# Patient Record
Sex: FEMALE | Race: WHITE | NOT HISPANIC OR LATINO | Employment: FULL TIME | URBAN - METROPOLITAN AREA
[De-identification: names, ages, dates, MRNs, and addresses within clinical notes are randomized per-mention and may not be internally consistent; named-entity substitution may affect disease eponyms.]

---

## 2016-08-25 LAB
HPV ABSTRACT: NORMAL
PAP-ABSTRACT: NORMAL

## 2017-02-24 ENCOUNTER — HOSPITAL ENCOUNTER (OUTPATIENT)
Dept: MAMMOGRAPHY | Facility: CLINIC | Age: 42
Discharge: HOME | End: 2017-02-24
Attending: NURSE PRACTITIONER | Admitting: NURSE PRACTITIONER
Payer: COMMERCIAL

## 2017-02-24 ENCOUNTER — HOSPITAL ENCOUNTER (OUTPATIENT)
Dept: MAMMOGRAPHY | Facility: CLINIC | Age: 42
End: 2017-02-24
Attending: NURSE PRACTITIONER
Payer: COMMERCIAL

## 2017-02-24 DIAGNOSIS — N64.4 MASTODYNIA: ICD-10-CM

## 2017-02-24 DIAGNOSIS — N64.59 BREAST THICKENING: ICD-10-CM

## 2017-02-24 PROCEDURE — 77061 BREAST TOMOSYNTHESIS UNI: CPT

## 2017-02-24 PROCEDURE — 76642 ULTRASOUND BREAST LIMITED: CPT | Mod: RT

## 2017-02-27 ENCOUNTER — HOSPITAL ENCOUNTER (EMERGENCY)
Facility: CLINIC | Age: 42
Discharge: HOME | End: 2017-02-28
Attending: EMERGENCY MEDICINE | Admitting: EMERGENCY MEDICINE
Payer: COMMERCIAL

## 2017-02-27 ENCOUNTER — APPOINTMENT (OUTPATIENT)
Dept: GENERAL RADIOLOGY | Facility: CLINIC | Age: 42
End: 2017-02-27
Attending: EMERGENCY MEDICINE
Payer: COMMERCIAL

## 2017-02-27 DIAGNOSIS — R10.9 ABDOMINAL CRAMPING: ICD-10-CM

## 2017-02-27 DIAGNOSIS — R11.0 NAUSEA: ICD-10-CM

## 2017-02-27 LAB
ALBUMIN SERPL-MCNC: 4.7 G/DL (ref 3.4–5)
ALBUMIN UR-MCNC: 30 MG/DL
ALP SERPL-CCNC: 72 U/L (ref 40–150)
ALT SERPL W P-5'-P-CCNC: 24 U/L (ref 0–50)
ANION GAP SERPL CALCULATED.3IONS-SCNC: 11 MMOL/L (ref 3–14)
APPEARANCE UR: ABNORMAL
AST SERPL W P-5'-P-CCNC: 16 U/L (ref 0–45)
BACTERIA #/AREA URNS HPF: ABNORMAL /HPF
BASOPHILS # BLD AUTO: 0 10E9/L (ref 0–0.2)
BASOPHILS NFR BLD AUTO: 0.1 %
BILIRUB SERPL-MCNC: 0.5 MG/DL (ref 0.2–1.3)
BILIRUB UR QL STRIP: NEGATIVE
BUN SERPL-MCNC: 6 MG/DL (ref 7–30)
CALCIUM SERPL-MCNC: 9.5 MG/DL (ref 8.5–10.1)
CHLORIDE SERPL-SCNC: 100 MMOL/L (ref 94–109)
CO2 SERPL-SCNC: 23 MMOL/L (ref 20–32)
COLOR UR AUTO: YELLOW
CREAT SERPL-MCNC: 0.64 MG/DL (ref 0.52–1.04)
DIFFERENTIAL METHOD BLD: NORMAL
EOSINOPHIL # BLD AUTO: 0 10E9/L (ref 0–0.7)
EOSINOPHIL NFR BLD AUTO: 0.1 %
ERYTHROCYTE [DISTWIDTH] IN BLOOD BY AUTOMATED COUNT: 11.8 % (ref 10–15)
GFR SERPL CREATININE-BSD FRML MDRD: ABNORMAL ML/MIN/1.7M2
GLUCOSE SERPL-MCNC: 141 MG/DL (ref 70–99)
GLUCOSE UR STRIP-MCNC: 150 MG/DL
HCG UR QL: NEGATIVE
HCT VFR BLD AUTO: 42.4 % (ref 35–47)
HGB BLD-MCNC: 15.2 G/DL (ref 11.7–15.7)
HGB UR QL STRIP: NEGATIVE
IMM GRANULOCYTES # BLD: 0 10E9/L (ref 0–0.4)
IMM GRANULOCYTES NFR BLD: 0.2 %
INTERNAL QC OK POCT: YES
KETONES UR STRIP-MCNC: 10 MG/DL
LEUKOCYTE ESTERASE UR QL STRIP: ABNORMAL
LIPASE SERPL-CCNC: 152 U/L (ref 73–393)
LYMPHOCYTES # BLD AUTO: 1.3 10E9/L (ref 0.8–5.3)
LYMPHOCYTES NFR BLD AUTO: 13.6 %
MCH RBC QN AUTO: 32.4 PG (ref 26.5–33)
MCHC RBC AUTO-ENTMCNC: 35.8 G/DL (ref 31.5–36.5)
MCV RBC AUTO: 90 FL (ref 78–100)
MONOCYTES # BLD AUTO: 0.4 10E9/L (ref 0–1.3)
MONOCYTES NFR BLD AUTO: 4.3 %
MUCOUS THREADS #/AREA URNS LPF: PRESENT /LPF
NEUTROPHILS # BLD AUTO: 7.7 10E9/L (ref 1.6–8.3)
NEUTROPHILS NFR BLD AUTO: 81.7 %
NITRATE UR QL: NEGATIVE
NRBC # BLD AUTO: 0 10*3/UL
NRBC BLD AUTO-RTO: 0 /100
PH UR STRIP: 7 PH (ref 5–7)
PLATELET # BLD AUTO: 278 10E9/L (ref 150–450)
POTASSIUM SERPL-SCNC: 3.6 MMOL/L (ref 3.4–5.3)
PROT SERPL-MCNC: 8.3 G/DL (ref 6.8–8.8)
RBC # BLD AUTO: 4.69 10E12/L (ref 3.8–5.2)
RBC #/AREA URNS AUTO: 2 /HPF (ref 0–2)
SODIUM SERPL-SCNC: 134 MMOL/L (ref 133–144)
SP GR UR STRIP: 1.02 (ref 1–1.03)
SQUAMOUS #/AREA URNS AUTO: 11 /HPF (ref 0–1)
URN SPEC COLLECT METH UR: ABNORMAL
UROBILINOGEN UR STRIP-MCNC: NORMAL MG/DL (ref 0–2)
WBC # BLD AUTO: 9.4 10E9/L (ref 4–11)
WBC #/AREA URNS AUTO: 5 /HPF (ref 0–2)

## 2017-02-27 PROCEDURE — 85025 COMPLETE CBC W/AUTO DIFF WBC: CPT | Performed by: EMERGENCY MEDICINE

## 2017-02-27 PROCEDURE — 96374 THER/PROPH/DIAG INJ IV PUSH: CPT | Performed by: EMERGENCY MEDICINE

## 2017-02-27 PROCEDURE — 25000128 H RX IP 250 OP 636: Performed by: EMERGENCY MEDICINE

## 2017-02-27 PROCEDURE — 81025 URINE PREGNANCY TEST: CPT | Performed by: EMERGENCY MEDICINE

## 2017-02-27 PROCEDURE — 80053 COMPREHEN METABOLIC PANEL: CPT | Performed by: EMERGENCY MEDICINE

## 2017-02-27 PROCEDURE — 99284 EMERGENCY DEPT VISIT MOD MDM: CPT | Mod: 25 | Performed by: EMERGENCY MEDICINE

## 2017-02-27 PROCEDURE — 81001 URINALYSIS AUTO W/SCOPE: CPT | Performed by: EMERGENCY MEDICINE

## 2017-02-27 PROCEDURE — 83690 ASSAY OF LIPASE: CPT | Performed by: EMERGENCY MEDICINE

## 2017-02-27 PROCEDURE — S0028 INJECTION, FAMOTIDINE, 20 MG: HCPCS | Performed by: EMERGENCY MEDICINE

## 2017-02-27 PROCEDURE — 96375 TX/PRO/DX INJ NEW DRUG ADDON: CPT | Performed by: EMERGENCY MEDICINE

## 2017-02-27 PROCEDURE — 99284 EMERGENCY DEPT VISIT MOD MDM: CPT | Mod: Z6 | Performed by: EMERGENCY MEDICINE

## 2017-02-27 PROCEDURE — 25000125 ZZHC RX 250: Performed by: EMERGENCY MEDICINE

## 2017-02-27 PROCEDURE — 96361 HYDRATE IV INFUSION ADD-ON: CPT | Performed by: EMERGENCY MEDICINE

## 2017-02-27 PROCEDURE — 74020 XR ABDOMEN 2 VW: CPT

## 2017-02-27 RX ORDER — ONDANSETRON 2 MG/ML
8 INJECTION INTRAMUSCULAR; INTRAVENOUS ONCE
Status: COMPLETED | OUTPATIENT
Start: 2017-02-27 | End: 2017-02-27

## 2017-02-27 RX ADMIN — FAMOTIDINE 20 MG: 10 INJECTION, SOLUTION INTRAVENOUS at 22:45

## 2017-02-27 RX ADMIN — SODIUM CHLORIDE 1000 ML: 9 INJECTION, SOLUTION INTRAVENOUS at 22:36

## 2017-02-27 RX ADMIN — ONDANSETRON 8 MG: 2 INJECTION INTRAMUSCULAR; INTRAVENOUS at 22:39

## 2017-02-27 ASSESSMENT — ENCOUNTER SYMPTOMS
NAUSEA: 1
CONSTIPATION: 1
DIARRHEA: 1
ABDOMINAL PAIN: 1

## 2017-02-27 NOTE — ED AVS SNAPSHOT
[facility], Emergency Department    [address]    [place] [address]    Phone:  [phone]    Fax:  [phone]                                       Aditi Osuna   MRN: [id number]    Department:  [facility], Emergency Department   Date of Visit:  2/27/2017           Patient Information     Date Of Birth          1975        Your diagnoses for this visit were:     Abdominal cramping     Nausea        You were seen by Swapna Lemus MD.        Discharge Instructions         *Abdominal Pain, Unknown Cause (Female)    The exact cause of your abdominal (stomach) pain is not certain. This does not mean that this is something to worry about, or the right tests were not done. Everyone likes to know the exact cause of the problem, but sometimes with abdominal pain, there is no clear-cut cause, and this could be a good thing. The good news is that your symptoms can be treated, and you will feel better.   Your condition does not seem serious now; however, sometimes the signs of a serious problem may take more time to appear. For this reason, it is important for you to watch for any new symptoms, problems, or worsening of your condition.  Over the next few days, the abdominal pain may come and go, or be continuous. Other common symptoms can include nausea and vomiting. Sometimes it can be difficult to tell if you feel nauseous, you may just feel bad and not associate that feeling with nausea. Constipation, diarrhea, and a fever may go along with the pain.  The pain may continue even if treated correctly over the following days. Depending on how things go, sometimes the cause can become clear and may require further or different treatment. Additional evaluations, medications, or tests may be needed.  Home care  Your health care provider may prescribe medications for pain, symptoms, or an infection.  Follow the health care provider's instructions for taking these medications.  General  care    Rest until your next exam. No strenuous activities.    Try to find positions that ease discomfort. A small pillow placed on the abdomen may help relieve pain.    Something warm on your abdomen (such as a heating pad) may help, but be careful not to burn yourself.  Diet    Do not force yourself to eat, especially if having cramps, vomiting, or diarrhea.    Water is important so you do not get dehydrated. Soup may also be good. Sports drinks may also help, especially if they are not too acidic. Make sure you don't drink sugary drinks as this can make things worse. Take liquids in small amounts. Do not guzzle them.    Caffeine sometimes makes the pain and cramping worse.    Avoid dairy products if you have vomiting or diarrhea.    Don't eat large amounts at a time. Wait a few minutes between bites.    Eat a diet low in fiber (called a low-residue diet). Foods allowed include refined breads, white rice, fruit and vegetable juices without pulp, tender meats. These foods will pass more easily through the intestine.    Avoid fried or fatty foods, dairy, alcohol and spicy foods until your symptoms go away.  Follow-up care  Follow up with your health care provider as instructed, or if your pain does not begin to improve in the next 24 hours.  When to seek medical care  Seek prompt medical care if any of the following occur:    Pain gets worse or moves to the right lower abdomen    New or worsening vomiting or diarrhea    Swelling of the abdomen    Unable to pass stool for more than three days    New fever over 101  F (38.3 C), or rising fever    Blood in vomit or bowel movements (dark red or black color)    Jaundice (yellow color of eyes and skin)    Weakness, dizziness    Chest, arm, back, neck or jaw pain    Unexpected vaginal bleeding or missed period  Call 911  Call emergency services if any of the following occur:    Trouble breathing    Confusion    Fainting or loss of consciousness    Rapid heart  rate    Seizure    [id number] [employer], [address] [place] [address], [place] [address]. All rights reserved. This information is not intended as a substitute for professional medical care. Always follow your healthcare professional's instructions.      Please make an appointment to follow up with Your Primary Care Provider in 2-3 days if not improving.  Take a bottle of magnesium citrate, and if not having good stool output, take another bottle in 24 hours.      24 Hour Appointment Hotline       To make an appointment at any [facility], call [phone] ([phone]). If you don't have a family doctor or clinic, we will help you find one. [facility] clinics are conveniently located to serve the needs of you and your family.             Review of your medicines      START taking        Dose / Directions Last dose taken    magnesium citrate 1.745 GM/30ML solution   Dose:  296 mL   Quantity:  2 Bottle        Take 296 mLs by mouth daily for 2 doses   Refills:  0        ondansetron 8 MG ODT tab   Commonly known as:  ZOFRAN ODT   Dose:  8 mg   Quantity:  10 tablet        Take 1 tablet (8 mg) by mouth every 8 hours as needed for nausea   Refills:  0          Our records show that you are taking the medicines listed below. If these are incorrect, please call your family doctor or clinic.        Dose / Directions Last dose taken    azelastine 0.1 % spray   Commonly known as:  ASTELIN   Dose:  1 spray        Spray 1 spray into both nostrils 2 times daily   Refills:  0        fluticasone 50 MCG/ACT spray   Commonly known as:  FLONASE   Dose:  2 spray        Spray 2 sprays into both nostrils daily   Refills:  0        glucosamine-chondroitin 500-400 MG Caps per capsule   Dose:  1 capsule        Take 1 capsule by mouth daily   Refills:  0        Multi-vitamin Tabs tablet   Dose:  1 tablet        Take 1 tablet by mouth daily   Refills:  0        OMEGA-3 FISH OIL PO   Dose:  1 g        Take 1 g by mouth   Refills:  0         VITAMIN D (CHOLECALCIFEROL) PO   Dose:  2000 Units        Take 2,000 Units by mouth daily   Refills:  0                Prescriptions were sent or printed at these locations (2 Prescriptions)                   Other Prescriptions                Printed at Department/Unit printer (2 of 2)         ondansetron (ZOFRAN ODT) 8 MG ODT tab               magnesium citrate 1.745 GM/30ML solution                Procedures and tests performed during your visit     Abdomen XR, 2 vw, flat and upright    CBC with platelets differential    Comprehensive metabolic panel    Lipase    UA with Microscopic    hCG qual urine POCT      Orders Needing Specimen Collection     None      Pending Results     No orders found for last 3 day(s).            Pending Culture Results     No orders found for last 3 day(s).            Thank you for choosing [facility]       Thank you for choosing [facility] for your care. Our goal is always to provide you with excellent care. Hearing back from our patients is one way we can continue to improve our services. Please take a few minutes to complete the written survey that you may receive in the mail after you visit with us. Thank you!        [email or web address] Information     [facility] gives you secure access to your electronic health record. If you see a primary care provider, you can also send messages to your care team and make appointments. If you have questions, please call your primary care clinic.  If you do not have a primary care provider, please call [phone] and they will assist you.        Care EveryWhere ID     This is your Care EveryWhere ID. This could be used by other organizations to access your [facility] medical records  [id number]        After Visit Summary       This is your record. Keep this with you and show to your community pharmacist(s) and doctor(s) at your next visit.

## 2017-02-27 NOTE — ED AVS SNAPSHOT
[facility], Emergency Department    [address]    [facility] [address]    Phone:  [phone]    Fax:  [phone]                                       Aditi Osuna   MRN: [id number]    Department:  [facility], Emergency Department   Date of Visit:  2/27/2017           After Visit Summary Signature Page     I have received my discharge instructions, and my questions have been answered. I have discussed any challenges I see with this plan with the nurse or doctor.    ..........................................................................................................................................  Patient/Patient Representative Signature      ..........................................................................................................................................  Patient Representative Print Name and Relationship to Patient    ..................................................               ................................................  Date                                            Time    ..........................................................................................................................................  Reviewed by Signature/Title    ...................................................              ..............................................  Date                                                            Time

## 2017-02-28 VITALS
BODY MASS INDEX: 22.43 KG/M2 | HEART RATE: 70 BPM | SYSTOLIC BLOOD PRESSURE: 138 MMHG | RESPIRATION RATE: 16 BRPM | OXYGEN SATURATION: 100 % | DIASTOLIC BLOOD PRESSURE: 72 MMHG | TEMPERATURE: 97.9 F | WEIGHT: 140 LBS

## 2017-02-28 RX ORDER — ONDANSETRON 8 MG/1
8 TABLET, ORALLY DISINTEGRATING ORAL EVERY 8 HOURS PRN
Qty: 10 TABLET | Refills: 0 | Status: SHIPPED | OUTPATIENT
Start: 2017-02-28 | End: 2017-03-03

## 2017-02-28 RX ORDER — MAGNESIUM CARB/ALUMINUM HYDROX 105-160MG
296 TABLET,CHEWABLE ORAL DAILY
Qty: 2 BOTTLE | Refills: 0 | Status: SHIPPED | OUTPATIENT
Start: 2017-02-28 | End: 2017-03-02

## 2017-02-28 ASSESSMENT — ENCOUNTER SYMPTOMS
SHORTNESS OF BREATH: 0
FEVER: 0

## 2017-02-28 NOTE — ED PROVIDER NOTES
History     Chief Complaint   Patient presents with     Abdominal Pain     Onset Saturday with abdominal pain,radiating to back, diarrhea, chills, nausea and unable to eat,     HPI  Aditi Osuna is a 41 year old otherwise healthy female who presents to the ED today with abdominal pain. On Saturday, patient reports onset of lower abdominal cramping after eating dinner. Patient initially though it was food poisoning but has now had persistent abdominal pain with associated nausea. She states she was able to have a bowel movement with small pellet like stool at 3 AM last night. Patient states she had some apple sauce this evening and had one episode of diarrhea but was otherwise having pellet like stools prior. She denies vomiting but notes she has been salivating/ large amounts of saliva in her mouth secondary to nausea, but no actual vomiting. She denies hematuria or vaginal discharge. No fever. No recent sick contacts with similar sx ( with URI sx). She states she had the stomach flu in January and a sinus infection in December. She denies abdominal surgeries.      I have reviewed the Medications, Allergies, Past Medical and Surgical History, and Social History in the [employer] system.    PAST MEDICAL HISTORY: History reviewed. No pertinent past medical history.    PAST SURGICAL HISTORY: History reviewed. No pertinent past surgical history.    FAMILY HISTORY:   Family History   Problem Relation Age of Onset     OSTEOPOROSIS Mother      Hypertension Father      DIABETES Father      Other Cancer Maternal Grandfather      HEART DISEASE Paternal Grandfather        SOCIAL HISTORY:   Social History   Substance Use Topics     Smoking status: Never Smoker     Smokeless tobacco: Not on file     Alcohol use Yes     No current facility-administered medications for this encounter.      Current Outpatient Prescriptions   Medication     ondansetron (ZOFRAN ODT) 8 MG ODT tab     magnesium citrate 1.745 GM/30ML solution      fluticasone (FLONASE) 50 MCG/ACT nasal spray     azelastine (ASTELIN) 0.1 % nasal spray     multivitamin, therapeutic with minerals (MULTI-VITAMIN) TABS     Omega-3 Fatty Acids (OMEGA-3 FISH OIL PO)     VITAMIN D, CHOLECALCIFEROL, PO     glucosamine-chondroitin 500-400 MG CAPS        Allergies   Allergen Reactions     Augmentin Nausea and Vomiting     Sulfa Drugs Rash         Review of Systems   Constitutional: Negative for fever.   Respiratory: Negative for shortness of breath.    Cardiovascular: Negative for chest pain.   Gastrointestinal: Positive for abdominal pain, constipation, diarrhea and nausea.   All other systems reviewed and are negative.      Physical Exam   BP: 158/79  Pulse: 71  Heart Rate: 71  Temp: 96.2  F (35.7  C)  Resp: 16  Weight: 63.5 kg (140 lb)  SpO2: 99 %  Physical Exam   Constitutional: No distress.   HENT:   Head: Atraumatic.   Mouth/Throat: Oropharynx is clear and moist. No oropharyngeal exudate.   Eyes: Pupils are equal, round, and reactive to light. No scleral icterus.   Cardiovascular: Normal heart sounds and intact distal pulses.    Pulmonary/Chest: Breath sounds normal. No respiratory distress.   Abdominal: Soft. Bowel sounds are normal. There is tenderness (diffuse tenderness with wincing but without guarding).   Musculoskeletal: She exhibits no edema or tenderness.   Skin: Skin is warm. No rash noted. She is not diaphoretic.       ED Course     ED Course     Procedures       10:15 PM  The patient was seen and examined by Dr. Lemus  in Room 5.          Critical Care time:  none               Labs Ordered and Resulted from Time of ED Arrival Up to the Time of Departure from the ED   COMPREHENSIVE METABOLIC PANEL - Abnormal; Notable for the following:        Result Value    Glucose 141 (*)     Urea Nitrogen 6 (*)     All other components within normal limits   ROUTINE UA WITH MICROSCOPIC - Abnormal; Notable for the following:     Glucose Urine 150 (*)     Ketones Urine 10 (*)      Protein Albumin Urine 30 (*)     Leukocyte Esterase Urine Trace (*)     WBC Urine 5 (*)     Bacteria Urine Few (*)     Squamous Epithelial /HPF Urine 11 (*)     Mucous Urine Present (*)     All other components within normal limits   HCG QUAL URINE POCT - Normal   CBC WITH PLATELETS DIFFERENTIAL   LIPASE   URINE CULTURE AEROBIC BACTERIAL       Assessments & Plan (with Medical Decision Making)     The patient does have some diffuse abdominal tenderness.  She has wincing but no guarding.  The cause of her symptoms was not immediately clear based on her history. Labs were done, including a CBC which was unremarkable, CMP which was unremarkable with the exception of mildly elevated glucose, lipase which is normal.  Urinalysis shows 5 white cells, though does show 11 squamous cells, only trace LE. I do think this is likely contamination, and culture was sent.  UPT was negative.  Patient was given Zofran, IV fluid, and IV famotidine.  On repeat evaluation, patient states that her symptoms have dramatically improved.  She states that she still having some slight discomfort, though again, is dramatically improving compared with previous.  On repeat examination, the patient now has no wincing at all, and states that she has a very mild tenderness throughout the upper abdomen only.    I think the likelihood that this is related to an genitourinary problem is pretty low.  Cause of her symptoms is not completely clear, though the fact that it dramatically improved without any pain medication is reassuring.  Patient did admit that she had several very small  pellet  bowel movements, and wondered if she may have been constipated.  Plain abdominal x-ray was done which showed moderate amount of stool burden, no air-fluid levels.  I did discuss risks and benefits of CT scanning with the patient, and did not recommend this at this time given her history, exam, and lab findings, as I have a low suspicion that there will be a  clinically significant positive finding on the imaging.  However, the patient was given this option.  She declines at this point in time.  I have recommended discharge home with a prescription for magnesium citrate to see if getting her bowels moving will improve her symptoms.  She is agreeable to plan.  She was also given Zofran.  She is instructed to return to the emergency department immediately if she feels her symptoms are worsening, id she is developing fevers or any new or concerning symptoms.  She is also instructed to follow up with Primary care if symptoms are not resolving over the next couple days.  She verbalizes understanding and is comfortable with the plan.   This part of the document was transcribed by Kemi Carmichael, Medical Scribe.       I have reviewed the nursing notes.    I have reviewed the findings, diagnosis, plan and need for follow up with the patient.    Discharge Medication List as of 2/28/2017 12:23 AM      START taking these medications    Details   ondansetron (ZOFRAN ODT) 8 MG ODT tab Take 1 tablet (8 mg) by mouth every 8 hours as needed for nausea, Disp-10 tablet, R-0, Local Print      magnesium citrate 1.745 GM/30ML solution Take 296 mLs by mouth daily for 2 doses, Disp-2 Bottle, R-0, Local Print             Final diagnoses:   Abdominal cramping   Nausea     I,Kemi Carmichael , am serving as a trained medical scribe to document services personally performed by Swapna Lemus MD, based on the provider's statements to me.   ISwapna MD, was physically present and have reviewed and verified the accuracy of this note documented by Kemi Carmichael.    2/27/2017   [facility], EMERGENCY DEPARTMENT     Swapna Lemus MD  02/28/17 0043

## 2017-02-28 NOTE — DISCHARGE INSTRUCTIONS
*Abdominal Pain, Unknown Cause (Female)    The exact cause of your abdominal (stomach) pain is not certain. This does not mean that this is something to worry about, or the right tests were not done. Everyone likes to know the exact cause of the problem, but sometimes with abdominal pain, there is no clear-cut cause, and this could be a good thing. The good news is that your symptoms can be treated, and you will feel better.   Your condition does not seem serious now; however, sometimes the signs of a serious problem may take more time to appear. For this reason, it is important for you to watch for any new symptoms, problems, or worsening of your condition.  Over the next few days, the abdominal pain may come and go, or be continuous. Other common symptoms can include nausea and vomiting. Sometimes it can be difficult to tell if you feel nauseous, you may just feel bad and not associate that feeling with nausea. Constipation, diarrhea, and a fever may go along with the pain.  The pain may continue even if treated correctly over the following days. Depending on how things go, sometimes the cause can become clear and may require further or different treatment. Additional evaluations, medications, or tests may be needed.  Home care  Your health care provider may prescribe medications for pain, symptoms, or an infection.  Follow the health care provider's instructions for taking these medications.  General care    Rest until your next exam. No strenuous activities.    Try to find positions that ease discomfort. A small pillow placed on the abdomen may help relieve pain.    Something warm on your abdomen (such as a heating pad) may help, but be careful not to burn yourself.  Diet    Do not force yourself to eat, especially if having cramps, vomiting, or diarrhea.    Water is important so you do not get dehydrated. Soup may also be good. Sports drinks may also help, especially if they are not too acidic. Make sure you  don't drink sugary drinks as this can make things worse. Take liquids in small amounts. Do not guzzle them.    Caffeine sometimes makes the pain and cramping worse.    Avoid dairy products if you have vomiting or diarrhea.    Don't eat large amounts at a time. Wait a few minutes between bites.    Eat a diet low in fiber (called a low-residue diet). Foods allowed include refined breads, white rice, fruit and vegetable juices without pulp, tender meats. These foods will pass more easily through the intestine.    Avoid fried or fatty foods, dairy, alcohol and spicy foods until your symptoms go away.  Follow-up care  Follow up with your health care provider as instructed, or if your pain does not begin to improve in the next 24 hours.  When to seek medical care  Seek prompt medical care if any of the following occur:    Pain gets worse or moves to the right lower abdomen    New or worsening vomiting or diarrhea    Swelling of the abdomen    Unable to pass stool for more than three days    New fever over 101  F (38.3 C), or rising fever    Blood in vomit or bowel movements (dark red or black color)    Jaundice (yellow color of eyes and skin)    Weakness, dizziness    Chest, arm, back, neck or jaw pain    Unexpected vaginal bleeding or missed period  Call 911  Call emergency services if any of the following occur:    Trouble breathing    Confusion    Fainting or loss of consciousness    Rapid heart rate    Seizure    [id number] [facility], [address] [place] [address], [place] [address]. All rights reserved. This information is not intended as a substitute for professional medical care. Always follow your healthcare professional's instructions.      Please make an appointment to follow up with Your Primary Care Provider in 2-3 days if not improving.  Take a bottle of magnesium citrate, and if not having good stool output, take another bottle in 24 hours.

## 2017-09-01 ENCOUNTER — TRANSFERRED RECORDS (OUTPATIENT)
Dept: HEALTH INFORMATION MANAGEMENT | Facility: CLINIC | Age: 42
End: 2017-09-01

## 2017-09-01 ENCOUNTER — HOSPITAL ENCOUNTER (OUTPATIENT)
Dept: MAMMOGRAPHY | Facility: CLINIC | Age: 42
Discharge: HOME | End: 2017-09-01
Attending: NURSE PRACTITIONER | Admitting: NURSE PRACTITIONER
Payer: COMMERCIAL

## 2017-09-01 DIAGNOSIS — Z12.31 VISIT FOR SCREENING MAMMOGRAM: ICD-10-CM

## 2017-09-01 LAB
HPV ABSTRACT: NORMAL
PAP-ABSTRACT: NORMAL

## 2017-09-01 PROCEDURE — G0202 SCR MAMMO BI INCL CAD: HCPCS

## 2018-04-02 ENCOUNTER — VIRTUAL VISIT (OUTPATIENT)
Dept: FAMILY MEDICINE | Facility: OTHER | Age: 43
End: 2018-04-02

## 2018-04-02 NOTE — PROGRESS NOTES
"Date:   Clinician: Hoang Deutsch  Clinician NPI: [id number]  Patient: august BENJAMIN  Patient : 1975  Patient Address: [address] [place] [address]  Patient Phone: [phone]  Visit Protocol: URI  Patient Summary:  august is a 42 year old ( : 1975 ) female who initiated a Visit for cold, sinus infection, or influenza. When asked the question \"Please sign me up to receive news, health information and promotions from [employer].\", august responded \"No\".    august states her symptoms started gradually 7-9 days ago.   Her symptoms consist of facial pain or pressure, a cough, ear pain, a sore throat, a headache, nasal congestion, malaise, wheezing, myalgia, and tooth pain.   Symptom details     Nasal secretions: The color of her mucus is yellow and green.    Cough: august coughs a few times an hour and her cough is more bothersome at night. Phlegm comes into her throat when she coughs. She believes the phlegm causes the cough. The color of the phlegm is green and yellow.     Sore throat: august reports having mild throat pain (between 1-3 on a 10 point pain scale), does not have exudate on her tonsils, and is able to swallow liquids. The lymph nodes in her neck She is not sure if the lymph nodes in her neck are enlarged. She states that rashes have not appeared on the skin since the sore throat started.     Wheezing: august has not ever been diagnosed with asthma. The wheezing does not interfere with her normal daily activities.    Facial pain or pressure: The facial pain or pressure feels worse when bending over or leaning forward.     Headache: She states the headache is moderate (between 4-6 on a 10 point pain scale).     Tooth pain: The tooth pain is not caused by a cavity, recent dental work, or other mouth problems.      august denies having fever, dyspnea, chills, and rhinitis. She also denies having recent facial or sinus surgery in the past 60 days, taking " antibiotic medication for the symptoms, and double sickening (worsening symptoms after initial improvement).   Within the past week, august has not been exposed to someone with strep throat. She has not recently been exposed to someone with influenza. august has been in close contact with the following high risk individuals: children under the age of 5, people with asthma, heart disease or diabetes, and adults 65 or older.   august had 1 sinus infection within the past year.   Weight: 150 lbs   august does not smoke or use smokeless tobacco.   She denies pregnancy and denies breastfeeding. She has menstruated in the past month.  MEDICATIONS:  Guaifenesin + dextromethorphan (Robitussin DM, Mytussin DM, Mucinex DM), cetirizine (Zyrtec), acetaminophen (Tylenol), phenylephrine (Sudafed PE), and fluticasone (Flonase)   Patient free text response: Astelin spray  , ALLERGIES:   penicillin/amoxicillin/augmentin and sulfa (Bactrim/Septra)    Clinician Response:  Dear august,  Based on the information provided, you have acute bacterial sinusitis, also known as a sinus infection. Sinus infections are caused by bacteria or a virus and symptoms are almost always identical. The difference between the 2 types of infections is timing.  Sinus infections start as viral infections and symptoms improve on their own in about 7 days. If symptoms have not improved after 7 days or have even worsened, a bacterial infection may have developed.  Medication information  I am prescribing:     Azithromycin (Z-pack) 250 mg oral tablet. Take 2 tablets by mouth on the first day and then 1 tablet a day on days 2-5. There are no refills with this prescription.   Antibiotics can cause an allergic reaction even if you have taken them without a problem in the past. If you develop a new rash, swelling, or difficulty breathing, stop the medication and be seen in a clinic or urgent care immediately.  A yeast infection is a side effect of taking  antibiotics in some women. Please use [facility] to get treatment if you have symptoms of a yeast infection.  If you become pregnant during this course of treatment, stop taking the medication and contact your primary care provider.  Self care  The following tips will keep you as comfortable as possible while you recover:     Rest    Drink plenty of water and other liquids    Take a hot shower to loosen congestion    Use throat lozenges    Gargle with warm salt water (1/4 teaspoon of salt per 8 ounce glass of water)    Suck on frozen items such as popsicles or ice cubes    Drink hot tea with lemon and honey    Take a spoonful of honey to reduce your cough     When to seek care  Please be seen in a clinic or urgent care if any of the following occur:     Symptoms do not start to improve after 3 days of treatment    New symptoms develop, or symptoms become worse     Call 911 or go to the emergency room if you feel that your throat is closing off, you suddenly develop a rash, you are unable to swallow fluids, you are drooling, or you are having difficulty breathing.   Diagnosis: Acute bacterial sinusitis  Diagnosis ICD: J01.90  Prescription: azithromycin (Z-pack) 250 mg oral tablet 6 tablets, 5 days supply. Take 2 tablets by mouth on the first day and then 1 tablet a day on days 2-5. Refills: 0, Refill as needed: no, Allow substitutions: yes  Pharmacy: [facility] - [phone] - [address] [place] [id number]

## 2018-09-17 ENCOUNTER — HOSPITAL ENCOUNTER (OUTPATIENT)
Dept: MAMMOGRAPHY | Facility: CLINIC | Age: 43
Discharge: HOME | End: 2018-09-17
Attending: OBSTETRICS & GYNECOLOGY | Admitting: OBSTETRICS & GYNECOLOGY
Payer: COMMERCIAL

## 2018-09-17 DIAGNOSIS — Z12.31 VISIT FOR SCREENING MAMMOGRAM: ICD-10-CM

## 2018-09-17 PROCEDURE — 77063 BREAST TOMOSYNTHESIS BI: CPT

## 2018-09-30 ENCOUNTER — VIRTUAL VISIT (OUTPATIENT)
Dept: FAMILY MEDICINE | Facility: OTHER | Age: 43
End: 2018-09-30

## 2018-09-30 NOTE — PROGRESS NOTES
"Date:   Clinician: Earline Salinas  Clinician NPI: [id number]  Patient: august BENJAMIN  Patient : 1975  Patient Address: [address] [place] [address]  Patient Phone: [phone]  Visit Protocol: URI  Patient Summary:  august is a 43 year old ( : 1975 ) female who initiated a Visit for cold, sinus infection, or influenza. When asked the question \"Please sign me up to receive news, health information and promotions from [employer].\", august responded \"No\".    august states her symptoms started suddenly 7-9 days ago.   Her symptoms consist of enlarged lymph nodes, a cough, a headache, a sore throat, ear pain, nasal congestion, malaise, facial pain or pressure, and myalgia.   Symptom details     Nasal secretions: The color of her mucus is yellow.    Cough: august coughs a few times an hour and her cough is more bothersome at night. Phlegm comes into her throat when she coughs. She believes the phlegm causes the cough. The color of the phlegm is yellow.     Sore throat: august reports having moderate throat pain (between 4-6 on a 10 point pain scale), does not have exudate on her tonsils, and can swallow liquids. The lymph nodes in her neck are enlarged. A rash has not appeared on the skin since the sore throat started.     Facial pain or pressure: The facial pain or pressure feels worse when bending over or leaning forward.     Headache: She states the headache is moderate (between 4-6 on a 10 point pain scale).      august denies having rhinitis, chills, dyspnea, fever, wheezing, and teeth pain. She also denies double sickening (worsening symptoms after initial improvement), taking antibiotic medication for the symptoms, and having recent facial or sinus surgery in the past 60 days.   august is not sure if she has been exposed to someone with strep throat. She has not recently been exposed to someone with influenza. aguust has been in close contact with the " following high risk individuals: people with asthma, heart disease or diabetes, children under the age of 5, and adults 65 or older.   august had 2 sinus infections within the past year.   Weight: 156 lbs   august does not smoke or use smokeless tobacco.   She denies pregnancy and denies breastfeeding. She is currently menstruating.   Additional information as reported by the patient (free text): left ear feels swollen, with pressure, and popping sounds for 7 days   MEDICATIONS: Zoloft oral, Zyrtec-D oral, Flonase Allergy Relief nasal, azelastine nasal, ALLERGIES: Augmentin, Sulfa (Sulfonamide Antibiotics)  Clinician Response:  Dear august,  Based on the information provided, you have acute bacterial sinusitis, also known as a sinus infection. Sinus infections are caused by bacteria or a virus and symptoms are almost always identical. The difference between the 2 types of infections is timing.  Sinus infections start as viral infections and symptoms improve on their own in about 7 days. If symptoms have not improved after 7 days or have even worsened, a bacterial infection may have developed.  Medication information  I am prescribing:       Fluticasone 50 mcg/actuation nasal spray. Inhale 2 sprays in each nostril 1 time per day; after 1 week, may adjust to 1 - 2 sprays in each nostril 1 time per day. This medication takes several days to start working, so keep taking it even if it doesn't help right away. There are no refills with this prescription.      Doxycycline hyclate 100 mg oral tablet. Take 1 tablet by mouth 2 times per day for 7 days. There are no refills with this prescription.     Antibiotics can cause an allergic reaction even if you have taken them without a problem in the past. If you develop a new rash, swelling, or difficulty breathing, stop the medication and be seen in a clinic or urgent care immediately.  A yeast infection is a side effect of taking antibiotics in some women. Please use  [facility] to get treatment if you have symptoms of a yeast infection.  If you become pregnant during this course of treatment, stop taking the medication and contact your primary care provider.  Unless you are allergic to the over-the-counter medication(s) below, I recommend using:     Ibuprofen (Advil or store brand) 200 mg oral tablet. Take 1-3 tablets (200-600 mg) by mouth every 8 hours to help with the discomfort. Make sure to take the ibuprofen with food. Do not exceed 2400 mg in 24 hours.   Over-the-counter medications do not require a prescription. Ask the pharmacist if you have any questions.  Self care  The following tips will keep you as comfortable as possible while you recover:     Rest    Drink plenty of water and other liquids    Take a hot shower to loosen congestion    Use throat lozenges    Gargle with warm salt water (1/4 teaspoon of salt per 8 ounce glass of water)    Suck on frozen items such as popsicles or ice cubes    Drink hot tea with lemon and honey    Take a spoonful of honey to reduce your cough     When to seek care  Please be seen in a clinic or urgent care if any of the following occur:     Symptoms do not start to improve after 3 days of treatment    New symptoms develop, or symptoms become worse     Call 911 or go to the emergency room if you feel that your throat is closing off, you suddenly develop a rash, you are unable to swallow fluids, you are drooling, or you are having difficulty breathing.   Diagnosis: Acute bacterial sinusitis  Diagnosis ICD: J01.90  Prescription: fluticasone 50 mcg/actuation nasal spray,suspension 1 120 spray aerosol with adapter (grams), 30 days supply. Inhale 2 sprays in each nostril 1 time per day; after 1 week, may adjust to 1 - 2 sprays in each nostril 1 time per day.. Refills: 0, Refill as needed: no, Allow substitutions: yes  Prescription: doxycycline hyclate 100 mg oral tablet 14 tablet, 7 days supply. Take 1 tablet by mouth 2 times per day for 7  days. Refills: 0, Refill as needed: no, Allow substitutions: yes  Pharmacy: [id number] - [phone] - [address], [place] [address]

## 2018-10-06 ENCOUNTER — HEALTH MAINTENANCE LETTER (OUTPATIENT)
Age: 43
End: 2018-10-06

## 2019-09-26 ENCOUNTER — HOSPITAL ENCOUNTER (OUTPATIENT)
Dept: MAMMOGRAPHY | Facility: CLINIC | Age: 44
Discharge: HOME | End: 2019-09-26
Attending: OBSTETRICS & GYNECOLOGY | Admitting: OBSTETRICS & GYNECOLOGY
Payer: COMMERCIAL

## 2019-09-26 DIAGNOSIS — Z12.31 VISIT FOR SCREENING MAMMOGRAM: ICD-10-CM

## 2019-09-26 PROCEDURE — 77063 BREAST TOMOSYNTHESIS BI: CPT

## 2019-09-30 ENCOUNTER — HEALTH MAINTENANCE LETTER (OUTPATIENT)
Age: 44
End: 2019-09-30

## 2019-11-01 ENCOUNTER — TRANSFERRED RECORDS (OUTPATIENT)
Dept: HEALTH INFORMATION MANAGEMENT | Facility: CLINIC | Age: 44
End: 2019-11-01

## 2019-11-13 ENCOUNTER — OFFICE VISIT (OUTPATIENT)
Dept: FAMILY MEDICINE | Facility: CLINIC | Age: 44
End: 2019-11-13
Payer: COMMERCIAL

## 2019-11-13 VITALS
OXYGEN SATURATION: 100 % | SYSTOLIC BLOOD PRESSURE: 122 MMHG | BODY MASS INDEX: 26.29 KG/M2 | HEART RATE: 77 BPM | WEIGHT: 167.5 LBS | DIASTOLIC BLOOD PRESSURE: 80 MMHG | TEMPERATURE: 98.3 F | HEIGHT: 67 IN

## 2019-11-13 DIAGNOSIS — G47.00 INSOMNIA, UNSPECIFIED TYPE: Primary | ICD-10-CM

## 2019-11-13 DIAGNOSIS — F10.10 ALCOHOL ABUSE: ICD-10-CM

## 2019-11-13 PROCEDURE — 99203 OFFICE O/P NEW LOW 30 MIN: CPT | Performed by: NURSE PRACTITIONER

## 2019-11-13 RX ORDER — HYDROXYZINE PAMOATE 50 MG/1
50-100 CAPSULE ORAL
Qty: 60 CAPSULE | Refills: 1 | Status: SHIPPED | OUTPATIENT
Start: 2019-11-13 | End: 2019-12-10

## 2019-11-13 RX ORDER — TRAZODONE HYDROCHLORIDE 50 MG/1
50 TABLET, FILM COATED ORAL AT BEDTIME
Qty: 30 TABLET | Refills: 1 | Status: SHIPPED | OUTPATIENT
Start: 2019-11-13 | End: 2020-11-11

## 2019-11-13 RX ORDER — PSEUDOEPHEDRINE HCL 60 MG
60 TABLET ORAL EVERY 4 HOURS PRN
COMMUNITY
End: 2021-11-17

## 2019-11-13 ASSESSMENT — PATIENT HEALTH QUESTIONNAIRE - PHQ9
5. POOR APPETITE OR OVEREATING: MORE THAN HALF THE DAYS
SUM OF ALL RESPONSES TO PHQ QUESTIONS 1-9: 13

## 2019-11-13 ASSESSMENT — ANXIETY QUESTIONNAIRES
1. FEELING NERVOUS, ANXIOUS, OR ON EDGE: NEARLY EVERY DAY
2. NOT BEING ABLE TO STOP OR CONTROL WORRYING: NEARLY EVERY DAY
3. WORRYING TOO MUCH ABOUT DIFFERENT THINGS: NEARLY EVERY DAY
7. FEELING AFRAID AS IF SOMETHING AWFUL MIGHT HAPPEN: NOT AT ALL
6. BECOMING EASILY ANNOYED OR IRRITABLE: MORE THAN HALF THE DAYS
IF YOU CHECKED OFF ANY PROBLEMS ON THIS QUESTIONNAIRE, HOW DIFFICULT HAVE THESE PROBLEMS MADE IT FOR YOU TO DO YOUR WORK, TAKE CARE OF THINGS AT HOME, OR GET ALONG WITH OTHER PEOPLE: VERY DIFFICULT
5. BEING SO RESTLESS THAT IT IS HARD TO SIT STILL: SEVERAL DAYS
GAD7 TOTAL SCORE: 14

## 2019-11-13 ASSESSMENT — MIFFLIN-ST. JEOR: SCORE: 1432.41

## 2019-11-13 NOTE — PATIENT INSTRUCTIONS
Patient Education     Treating Insomnia     Learning to relax before bedtime can improve your sleep.   Good sleeping habits are a key part of treatment. If needed, some medicines may help you sleep better at first. Making healthy lifestyle changes and learning to relax can improve your sleep. Treating insomnia takes commitment. But trust that your efforts will pay off. Be sure to talk with your healthcare provider before taking any medicine.  Healthy lifestyle  Your lifestyle affects your health and your sleep. Here are some healthy habits:    Keep a regular sleep schedule. Go to bed and get up at the same time each day.    Exercise regularly. It may help you reduce stress. Avoid strenuous exercise for 2 to 4 hours before bedtime.    Avoid or limit naps, especially in the late afternoon.    Use your bed only for sleep and sex.    Don t spend too much time in bed trying to fall asleep. If you can t fall asleep, get up and do something (no electronics) until you become tired and drowsy.    Avoid or limit caffeine and nicotine for up to 6 hours before bedtime. They can keep you awake at night.     Also avoid alcohol for at least 4 to 6 hours before bedtime. It may help you fall asleep at first. But you will have more awakenings during the night. And your sleep will not be restful.  Before bedtime  To sleep better every night, try these tips:    Have a bedtime routine to let your body and mind know when it s time to sleep.    Think of going to bed as relaxing and enjoyable. Sleep will come sooner.    If your worries don t let you sleep, write them down in a diary. Then close it, and go to bed.    Make sure the room is not too hot or too cold. If it s not dark enough, an eye mask can help. If it s noisy, try using earplugs.    Don't eat a large meal just before bedtime.    Remove noises, bright lights, TVs, cell phones, and computers from your sleeping environment.    Use a comfortable mattress and pillow.  Learn to  relax  Stress, anxiety, and body tension may keep you awake at night. To unwind before bedtime, try a warm bath, meditation, or yoga. Also try the following:    Deep breathing. Sit or lie back in a chair. Take a slow, deep breath. Hold it for 5 counts. Then breathe out slowly through your mouth. Keep doing this until you feel relaxed.    Progressive muscle relaxation. Tense and then relax the muscles in your body as you breathe deeply. Start with your feet and work up your body to your neck and face.  Cognitive Behavioral Therapy (CBT)  CBT is the most effective treatment for long-term insomnia. It tries to address the underlying causes of your sleep problems, including your habits and how you think about sleep.  Individual Therapy  Misha Santos PsyD, REBECCA  Insomnia   [facility]: [phone]    [facility]: [phone]  [facility]  Visit www.[place].org or call [phone] to find a clinic close to you.  Suggested resources  The resources below may help you relax. This list is for information only. [facility] is not responsible for the quality of services or the actions of any person or organization. There may be a fee to use some of these resources.  Insomnia treatment books  Shilo Mind by Radha Gutierrez and Sabina Fields (2013)  Overcoming Insomnia by Robby Tamez and Radha Gutierrez (2008)  Quiet Your Mind and Get to Sleep: Solutions to Insomnia for Those with Depression, Anxiety or Chronic Pain by Radha Gutierrez and Sabina Fields (2009)  No More Sleepless Nights by Adrián Caban and Pepper Mai (1996)  Say Shilo to Insomnia by Krystian Diaz (2009)  The Insomnia Workbook by Danuta Lynn and Indra Park (2009)  The Insomnia Answer by Reji Gifford and Ag Terry (2006)  Stress management and relaxation books  The Relaxation and Stress Reduction Workbook by Micki Castaneda,  Ce Torres and Gurinder Castillo (2008)  Stress Management Workbook: Techniques and Self-Assessment Procedures by Sharee Rahman and Ishan Phan (1997)  A Mindfulness-Based Stress Reduction Workbook by Tereso Rojas and Patsy Moreno (2010)  The Complete Stress Management Workbook by Shashi Mendez, Diego Silverio and Santhosh Adams (1996)  Online programs  www.SHUTi.me (pronounced shut eye). There is a fee for this program.   www.sleepIO.com (pronounced sleep ee oh). There is a fee for this program.  Other online resources  Deep breathing and progressive muscle relaxation (PMR):    http://www.Grooveshark  Meditation:    www.FemasysranPinstripe    www.Wireless Toyzguidedb3 biomeditationb3 biosite.com  Guided imagery:    http://www.Grooveshark    http://[employer].Arkimedia  (click on  Resource Library,  then choose  Meditation, Relaxation, Guided Imagery )  Apps for your mobile device:    Autogenic Training Progressive Muscle Relaxation by [employer] He    Calm: Meditation and Sleep Stories by Calm.com, Inc.    ActivePath Timer - Meditation Jared by [employer].  This is not a prescription and these resources are optional. You must pay for any costs when using these resources. Please ask your insurance carrier if you can be reimbursed for these resources. If so, you are responsible for sending the needed details to your insurance carrier. These resources may also be tax deductible as medical expenses. Check with your .  Date Last Reviewed: 5/18/2018  Clinically reviewed by Misha Santos PsyD, REBECCA, [facility], Director of the Insomnia and Behavioral Sleep Health Program, [facility].    9593-2947 [employer]. [address] [place] [address], [place] [address]. All rights reserved. This information is not intended as a substitute for professional medical care. Always follow your healthcare professional's instructions.

## 2019-11-13 NOTE — PROGRESS NOTES
Subjective     Aditi Osuna is a 44 year old female who presents to clinic today for the following health issues:    HPI   Insomnia  Onset: 1 week    Description:   Time to fall asleep (sleep latency): 30 minutes  Middle of night awakening:  YES  Early morning awakening:  no     Progression of Symptoms:  worsening    Accompanying Signs & Symptoms:  Daytime sleepiness/napping: no   Excessive snoring/apnea: no   Restless legs: no   Frequent urination: no   Chronic pain:  no     History:  Prior Insomnia: YES    Precipitating factors:   New stressful situation: YES- divorce   Caffeine intake: YES  OTC decongestants: YES  Any new medications: no     Alleviating factors:  Self medicating (alcohol, etc.):  YES- glass of wine, admits she needs to stop    Therapies Tried and outcome: ambien Rx- does not want to discuss other options     Has had insomnia   Divorce 4 months ago  Ambien doesn't want to get hooked on it   zoloft started a year ago panic attack   Avoids elevators but did take today and did fine    Wants to stop alcohol on her own, feels ok physically     5 yr old   pap utd Tdap utd  Therapist     Establishing care today  no Primary Care Provider for while, [place] [facility]         Patient Active Problem List   Diagnosis   (none) - all problems resolved or deleted     No past surgical history on file.    Social History     Tobacco Use     Smoking status: Never Smoker     Smokeless tobacco: Never Used   Substance Use Topics     Alcohol use: Yes     Family History   Problem Relation Age of Onset     Other Cancer Maternal Grandfather      Heart Disease Paternal Grandfather      Osteoporosis Mother      Hypertension Father      Diabetes Father          Allergies   Allergen Reactions     Augmentin Nausea and Vomiting     Sulfa Drugs Rash     Recent Labs   Lab Test 02/27/17  2227   ALT 24   CR 0.64   GFRESTIMATED >90  Non  GFR Calc     GFRESTBLACK >90   GFR Calc    "  POTASSIUM 3.6      BP Readings from Last 3 Encounters:   11/13/19 122/80   02/28/17 138/72   11/13/15 110/70    Wt Readings from Last 3 Encounters:   11/13/19 76 kg (167 lb 8 oz)   02/27/17 63.5 kg (140 lb)   11/13/15 67.6 kg (149 lb)                    Reviewed and updated as needed this visit by Provider         Review of Systems   ROS COMP: Constitutional, HEENT, cardiovascular, pulmonary, GI, , musculoskeletal, neuro, skin, endocrine and psych systems are negative, except as otherwise noted.      Objective    /80   Pulse 77   Temp 98.3  F (36.8  C) (Oral)   Ht 1.702 m (5' 7\")   Wt 76 kg (167 lb 8 oz)   SpO2 100%   BMI 26.23 kg/m    Body mass index is 26.23 kg/m .  Physical Exam       Diagnostic Test Results:  Labs reviewed in Epic  none         Assessment & Plan       ICD-10-CM    1. Insomnia, unspecified type G47.00 hydrOXYzine (VISTARIL) 50 MG capsule     traZODone (DESYREL) 50 MG tablet   2. Alcohol abuse F10.10    in the past used ambien and pt admits was kind of hooked on these, at first requested this for dealing with stress and insomnia after divorce, then agrees to trial another option.   Also admits to risky self medicating with alcohol, motivated to cut down today discussed tips and offered detox or rehab if needed she said is not necessary     Will do trial of trazodone, can increase to 2 tablets nightly if desired, notify if any concerns or side effects   Adjustment disorder vs panic and anxiety, consider hydroxyzine and/or propanolol prn for panic if any issues, pt declines today      Established care   Recommended lab work if not done at her last clinic   linda signed     BMI:   Estimated body mass index is 26.23 kg/m  as calculated from the following:    Height as of this encounter: 1.702 m (5' 7\").    Weight as of this encounter: 76 kg (167 lb 8 oz).   Weight management plan: Discussed healthy diet and exercise guidelines        Patient Instructions       Patient Education "     Treating Insomnia     Learning to relax before bedtime can improve your sleep.   Good sleeping habits are a key part of treatment. If needed, some medicines may help you sleep better at first. Making healthy lifestyle changes and learning to relax can improve your sleep. Treating insomnia takes commitment. But trust that your efforts will pay off. Be sure to talk with your healthcare provider before taking any medicine.  Healthy lifestyle  Your lifestyle affects your health and your sleep. Here are some healthy habits:    Keep a regular sleep schedule. Go to bed and get up at the same time each day.    Exercise regularly. It may help you reduce stress. Avoid strenuous exercise for 2 to 4 hours before bedtime.    Avoid or limit naps, especially in the late afternoon.    Use your bed only for sleep and sex.    Don t spend too much time in bed trying to fall asleep. If you can t fall asleep, get up and do something (no electronics) until you become tired and drowsy.    Avoid or limit caffeine and nicotine for up to 6 hours before bedtime. They can keep you awake at night.     Also avoid alcohol for at least 4 to 6 hours before bedtime. It may help you fall asleep at first. But you will have more awakenings during the night. And your sleep will not be restful.  Before bedtime  To sleep better every night, try these tips:    Have a bedtime routine to let your body and mind know when it s time to sleep.    Think of going to bed as relaxing and enjoyable. Sleep will come sooner.    If your worries don t let you sleep, write them down in a diary. Then close it, and go to bed.    Make sure the room is not too hot or too cold. If it s not dark enough, an eye mask can help. If it s noisy, try using earplugs.    Don't eat a large meal just before bedtime.    Remove noises, bright lights, TVs, cell phones, and computers from your sleeping environment.    Use a comfortable mattress and pillow.  Learn to relax  Stress,  anxiety, and body tension may keep you awake at night. To unwind before bedtime, try a warm bath, meditation, or yoga. Also try the following:    Deep breathing. Sit or lie back in a chair. Take a slow, deep breath. Hold it for 5 counts. Then breathe out slowly through your mouth. Keep doing this until you feel relaxed.    Progressive muscle relaxation. Tense and then relax the muscles in your body as you breathe deeply. Start with your feet and work up your body to your neck and face.  Cognitive Behavioral Therapy (CBT)  CBT is the most effective treatment for long-term insomnia. It tries to address the underlying causes of your sleep problems, including your habits and how you think about sleep.  Individual Therapy  Misha Santos PsyD,   Insomnia   [facility]    [facility]: [phone]    [facility]: [phone]  [facility]  Visit [email or web address] or call [phone] to find a clinic close to you.  Suggested resources  The resources below may help you relax. This list is for information only. [facility] is not responsible for the quality of services or the actions of any person or organization. There may be a fee to use some of these resources.  Insomnia treatment books  Shilo Mind by Radha Gutierrez and Sabina Fields (2013)  Overcoming Insomnia by Robby Tamez and Radha Gutierrez (2008)  Quiet Your Mind and Get to Sleep: Solutions to Insomnia for Those with Depression, Anxiety or Chronic Pain by Radha Gutierrez and Sabina Fields (2009)  No More Sleepless Nights by Adrián Caban and Pepper Mai (1996)  Say Shilo to Insomnia by Krystian Diaz (2009)  The Insomnia Workbook by Danuta Lynn and Indra Park (2009)  The Insomnia Answer by Reji Gifford and Ag Terry (2006)  Stress management and relaxation books  The Relaxation and Stress Reduction Workbook by Ce Charles  Luis Torres and Gurinder Castillo (2008)  Stress Management Workbook: Techniques and Self-Assessment Procedures by Sharee Rahman and Ishan Phan (1997)  A Mindfulness-Based Stress Reduction Workbook by Tereso Rojas and Patsy Moreno (2010)  The Complete Stress Management Workbook by Shashi Mendez, Diego Silverio and Santhosh Adams (1996)  Online programs  www.SHUTi.me (pronounced shut eye). There is a fee for this program.   www.sleepIO.com (pronounced sleep ee oh). There is a fee for this program.  Other online resources  Deep breathing and progressive muscle relaxation (PMR):    http://www.GoodRx  Meditation:    www."Sphere ([employer] Inc[employer]"    www.OpargoguidedOneBuckResumemeditationOneBuckResumesite.com  Guided imagery:    http://www.GoodRx    http://Inuvo.Appreciation Engine  (click on  Resource Library,  then choose  Meditation, Relaxation, Guided Imagery )  Apps for your mobile device:    Autogenic Training Progressive Muscle Relaxation by Proficiency    Calm: Meditation and Sleep Stories by Calm.com, Inc.    MAG Interactive Timer - Meditation Jared by [employer].  This is not a prescription and these resources are optional. You must pay for any costs when using these resources. Please ask your insurance carrier if you can be reimbursed for these resources. If so, you are responsible for sending the needed details to your insurance carrier. These resources may also be tax deductible as medical expenses. Check with your .  Date Last Reviewed: 5/18/2018  Clinically reviewed by Misha Santos PsyD, REBECCA, [facility], Director of the Insomnia and Behavioral Sleep Health Program, [facility].    1733-9076 [employer]. [address] [place] [address], [place] [address]. All rights reserved. This information is not intended as a substitute for professional medical care. Always follow your healthcare professional's instructions.               No follow-ups on file.    NITIN Cosme  [facility]

## 2019-11-14 ASSESSMENT — ANXIETY QUESTIONNAIRES: GAD7 TOTAL SCORE: 14

## 2019-12-10 ENCOUNTER — OFFICE VISIT (OUTPATIENT)
Dept: FAMILY MEDICINE | Facility: CLINIC | Age: 44
End: 2019-12-10
Payer: COMMERCIAL

## 2019-12-10 VITALS
OXYGEN SATURATION: 98 % | DIASTOLIC BLOOD PRESSURE: 72 MMHG | WEIGHT: 163 LBS | BODY MASS INDEX: 25.53 KG/M2 | TEMPERATURE: 98.2 F | HEART RATE: 78 BPM | SYSTOLIC BLOOD PRESSURE: 119 MMHG

## 2019-12-10 DIAGNOSIS — G47.00 INSOMNIA, UNSPECIFIED TYPE: Primary | ICD-10-CM

## 2019-12-10 DIAGNOSIS — Z97.5 IUD (INTRAUTERINE DEVICE) IN PLACE: ICD-10-CM

## 2019-12-10 PROCEDURE — 99213 OFFICE O/P EST LOW 20 MIN: CPT | Performed by: NURSE PRACTITIONER

## 2019-12-10 RX ORDER — HYDROXYZINE PAMOATE 50 MG/1
50-100 CAPSULE ORAL
Qty: 180 CAPSULE | Refills: 3 | Status: SHIPPED | OUTPATIENT
Start: 2019-12-10 | End: 2020-11-11

## 2019-12-10 NOTE — PROGRESS NOTES
Subjective     Aditi Osuna is a 44 year old female who presents to clinic today for the following health issues:    HPI   Insomnia follow up medication trazodone, hydroxyzine   Onset: early November     Description:   Insomnia waking up 5-6 times per night now after medications waking 3-4    Intensity: moderate    Progression of Symptoms:  improving        Therapies Tried and outcome: Trazodone- did not like medications feels it made her more anxious, Hydroxyzine - working great   Not sure if trazodone wasn't good because needed hydroxyzine but does get relief with the vistaril, 100 mg   Papers were served so divorce nearly over, he moved out now      Patient Active Problem List   Diagnosis     IUD (intrauterine device) in place     Insomnia, unspecified type     History reviewed. No pertinent surgical history.    Social History     Tobacco Use     Smoking status: Never Smoker     Smokeless tobacco: Never Used   Substance Use Topics     Alcohol use: Yes     Family History   Problem Relation Age of Onset     Other Cancer Maternal Grandfather      Heart Disease Paternal Grandfather      Osteoporosis Mother      Hypertension Father      Diabetes Father          Current Outpatient Medications   Medication Sig Dispense Refill     azelastine (ASTELIN) 0.1 % nasal spray Spray 1 spray into both nostrils 2 times daily       cetirizine HCl 10 MG CAPS        fluticasone (FLONASE) 50 MCG/ACT nasal spray Spray 2 sprays into both nostrils daily       glucosamine-chondroitin 500-400 MG CAPS Take 1 capsule by mouth daily       hydrOXYzine (VISTARIL) 50 MG capsule Take 1-2 capsules ( mg) by mouth nightly as needed for other (sleep) 180 capsule 3     multivitamin, therapeutic with minerals (MULTI-VITAMIN) TABS Take 1 tablet by mouth daily       Omega-3 Fatty Acids (OMEGA-3 FISH OIL PO) Take 1 g by mouth       pseudoePHEDrine (SUDAFED) 60 MG tablet Take 60 mg by mouth every 4 hours as needed for congestion       sertraline  "(ZOLOFT) 50 MG tablet Take 50 mg by mouth daily       VITAMIN D, CHOLECALCIFEROL, PO Take 2,000 Units by mouth daily       traZODone (DESYREL) 50 MG tablet Take 1 tablet (50 mg) by mouth At Bedtime (Patient not taking: Reported on 12/10/2019) 30 tablet 1     Allergies   Allergen Reactions     Augmentin Nausea and Vomiting     Sulfa Drugs Rash     BP Readings from Last 3 Encounters:   12/10/19 119/72   11/13/19 122/80   02/28/17 138/72    Wt Readings from Last 3 Encounters:   12/10/19 73.9 kg (163 lb)   11/13/19 76 kg (167 lb 8 oz)   02/27/17 63.5 kg (140 lb)                    Reviewed and updated as needed this visit by Provider         Review of Systems   ROS COMP: Constitutional, HEENT, cardiovascular, pulmonary, GI, , musculoskeletal, neuro, skin, endocrine and psych systems are negative, except as otherwise noted.      Objective    /72   Pulse 78   Temp 98.2  F (36.8  C) (Oral)   Wt 73.9 kg (163 lb)   SpO2 98%   BMI 25.53 kg/m    Body mass index is 25.53 kg/m .  Physical Exam   GENERAL: healthy, alert and no distress  RESP: Respiratory rate regular and breathing easily   CV: No abnormal color and extremities warm   NEURO: Normal strength and tone, mentation intact and speech normal  PSYCH: mentation appears normal, affect normal/bright    Diagnostic Test Results:  Labs reviewed in Epic        Assessment & Plan       ICD-10-CM    1. Insomnia, unspecified type G47.00 hydrOXYzine (VISTARIL) 50 MG capsule   2. IUD (intrauterine device) in place Z97.5     Mirena placed 9/26/2019   continue hydroxyzine, continue counseling  Continue Regular exercise, good self care   follow up prn at next health maintenance exam would be fine     BMI:   Estimated body mass index is 25.53 kg/m  as calculated from the following:    Height as of 11/13/19: 1.702 m (5' 7\").    Weight as of this encounter: 73.9 kg (163 lb).       Return for next annual exam or as needed.    NITIN Cosme [facility] " [place]

## 2020-02-02 ENCOUNTER — VIRTUAL VISIT (OUTPATIENT)
Dept: FAMILY MEDICINE | Facility: OTHER | Age: 45
End: 2020-02-02

## 2020-02-02 NOTE — PROGRESS NOTES
"Date: 2020 11:44:59  Clinician: Meri Mckeon  Clinician NPI: [id number]  Patient: august BENJAMIN  Patient : 1975  Patient Address: [address] [place] [id number]  Patient Phone: [phone]  Visit Protocol: URI  Patient Summary:  august is a 44 year old ( : 1975 ) female who initiated a Visit for cold, sinus infection, or influenza. When asked the question \"Please sign me up to receive news, health information and promotions from [employer].\", august responded \"No\".    august states her symptoms started gradually 10-13 days ago. After her symptoms started, they improved and then got worse again.   Her symptoms consist of a headache, a sore throat, ear pain, enlarged lymph nodes, nasal congestion, malaise, rhinitis, tooth pain, facial pain or pressure, and myalgia. august also feels feverish.   Symptom details     Nasal secretions: The color of her mucus is yellow.    Sore throat: august reports having mild throat pain (1-3 on a 10 point pain scale), does not have exudate on her tonsils, and can swallow liquids. The lymph nodes in her neck are enlarged. A rash has not appeared on the skin since the sore throat started.     Temperature: Her current temperature is 98.9 degrees Fahrenheit.     Facial pain or pressure: The facial pain or pressure feels worse when bending over or leaning forward.     Headache: She states the headache is moderate (4-6 on a 10 point pain scale).     Tooth pain: The tooth pain is not caused by a cavity, recent dental work, or other mouth problems.      august denies having chills, wheezing, and cough. She also denies taking antibiotic medication for the symptoms and having recent facial or sinus surgery in the past 60 days. She is not experiencing dyspnea.   Precipitating events  Within the past week, august has been exposed to someone with strep throat. She has recently been exposed to someone with influenza. august has been in close " contact with the following high risk individuals: adults 65 or older, pregnant women, and people with asthma, heart disease or diabetes.   Pertinent medical history  august had 1 sinus infection within the past year.   august does not get yeast infections when she takes antibiotics.   Weight: 160 lbs   august does not smoke or use smokeless tobacco.   She denies pregnancy and denies breastfeeding. She has menstruated in the past month.   Weight: 160 lbs    MEDICATIONS: Sudafed oral, hydroxyzine pamoate oral, azelastine nasal, Flonase Allergy Relief nasal, Zyrtec-D oral, Zoloft oral, ALLERGIES: Augmentin, Sulfa (Sulfonamide Antibiotics)  Clinician Response:  Dear august,  Based on the information provided, you have acute bacterial sinusitis, also known as a sinus infection. Sinus infections are caused by bacteria or a virus and symptoms are almost always identical. The difference between the 2 types of infections is timing.  Sinus infections start as viral infections and symptoms improve on their own in about 7 days. If symptoms have not improved after 7 days or have even worsened, a bacterial infection may have developed.  Medication information  I am prescribing:     Doxycycline hyclate 100 mg oral tablet. Take 1 tablet by mouth 2 times per day for 7 days. There are no refills with this prescription.   Certain antibiotics such as Doxycycline, levofloxacin, and moxifloxacin can cause your skin to be more sensitive to the sun. Exposure to the sun when taking these antibiotics may cause skin rash, itching, redness, or a severe sunburn. Be sure to avoid prolonged or direct exposure to the sun, especially between 10 am and 3 pm, if possible. Wear protective clothing and use sunscreen of SPF 30 or higher when you are outdoors.  Yeast infections can be a common side effect of antibiotics. The most common symptom of a yeast infection is itchiness in and around the vagina. Other signs and symptoms include burning,  redness, or a thick, white vaginal discharge that looks like cottage cheese and does not have a bad smell.  If you become pregnant during this course of treatment, stop taking the medication and contact your primary care provider.  Self care  The following tips will keep you as comfortable as possible while you recover:     Rest    Drink plenty of water and other liquids    Take a hot shower to loosen congestion    Use throat lozenges    Gargle with warm salt water (1/4 teaspoon of salt per 8 ounce glass of water)    Suck on frozen items such as popsicles or ice cubes    Drink hot tea with lemon and honey     When to seek care  Please be seen in a clinic or urgent care if any of the following occur:     Symptoms do not start to improve after 3 days of treatment    New symptoms develop, or symptoms become worse     It is possible to have an allergic reaction to an antibiotic even if you have not had one in the past. If you notice a new rash, significant swelling, or difficulty breathing, stop taking this medication immediately and go to a clinic or urgent care.  Call 911 or go to the emergency room if you feel that your throat is closing off, you suddenly develop a rash, you are unable to swallow fluids, you are drooling, or you are having difficulty breathing.   Diagnosis: Acute bacterial sinusitis  Diagnosis ICD: J01.90  Prescription: doxycycline hyclate 100 mg oral tablet 14 tablet, 7 days supply. Take 1 tablet by mouth 2 times per day for 7 days. Refills: 0, Refill as needed: no, Allow substitutions: yes  Pharmacy: [id number] - [phone] - [address], [place] [address]

## 2020-03-27 ENCOUNTER — VIRTUAL VISIT (OUTPATIENT)
Dept: FAMILY MEDICINE | Facility: OTHER | Age: 45
End: 2020-03-27

## 2020-03-27 NOTE — PROGRESS NOTES
"Date: 2020 17:08:41  Clinician: Silas Batista  Clinician NPI: [id number]  Patient: august BENJAMIN  Patient : 1975  Patient Address: [address] [place] [address]  Patient Phone: [phone]  Visit Protocol: URI  Patient Summary:  august is a 44 year old ( : 1975 ) female who initiated a Visit for COVID-19 (Coronavirus) evaluation and screening. When asked the question \"Please sign me up to receive news, health information and promotions from [employer].\", august responded \"No\".    august states her symptoms started 1-2 days ago.   Her symptoms consist of ear pain, rhinitis, a sore throat, a cough, nasal congestion, malaise, tooth pain, a headache, facial pain or pressure, and myalgia. august also feels feverish.   Symptom details     Nasal secretions: The color of her mucus is clear.    Cough: august coughs a few times an hour and her cough is more bothersome at night. Phlegm does not come into her throat when she coughs. She does not believe her cough is caused by post-nasal drip.     Sore throat: august reports having moderate throat pain (4-6 on a 10 point pain scale), does not have exudate on her tonsils, and can swallow liquids. The lymph nodes in her neck are not enlarged. A rash has not appeared on the skin since the sore throat started.     Temperature: Her current temperature is 100.1 degrees Fahrenheit. august has had a temperature over 100 degrees Fahrenheit for 1-2 days.     Facial pain or pressure: The facial pain or pressure does not feel worse when bending or leaning forward.     Headache: She states the headache is mild (1-3 on a 10 point pain scale).     Tooth pain: The tooth pain is not caused by a cavity, recent dental work, or other mouth problems.      august denies having wheezing, enlarged lymph nodes, and chills. She also denies taking antibiotic medication for the symptoms and having recent facial or sinus surgery in the past 60 days. She is " not experiencing dyspnea.   Precipitating events  Within the past week, august has not been exposed to someone with strep throat. She has not recently been exposed to someone with influenza. august has been in close contact with the following high risk individuals: children under the age of 5, immunocompromised people, people with asthma, heart disease or diabetes, and pregnant women.   Pertinent COVID-19 (Coronavirus) information  august has not traveled internationally or to the areas where COVID-19 (Coronavirus) is widespread, including cruise ship travel in the last 14 days before the start of her symptoms.   august has not had a close contact with a laboratory-confirmed COVID-19 patient within 14 days of symptom onset. She also has not had a close contact with a suspected COVID-19 patient within 14 days of symptom onset.   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 She provides direct patient care. She lives with a healthcare worker.   Pertinent medical history  august had 2 sinus infections within the past year.   august does not get yeast infections when she takes antibiotics.   august does not need a return to work/school note.   Weight: 160 lbs   august does not smoke or use smokeless tobacco.   She denies pregnancy and denies breastfeeding. She has menstruated in the past month.   Weight: 160 lbs    MEDICATIONS: Sudafed oral, hydroxyzine pamoate oral, azelastine nasal, Flonase Allergy Relief nasal, Zyrtec-D oral, Zoloft oral, ALLERGIES: Augmentin, Sulfa (Sulfonamide Antibiotics)  Clinician Response:  Dear august,   Based on the information you have provided, you do have symptoms that are consistent with Coronavirus (COVID-19).   The coronavirus causes mild to severe respiratory illness with the most common symptoms including fever, cough and difficulty breathing. Unfortunately, many viruses cause similar symptoms and it can be difficult  to distinguish between viruses, especially in mild cases, so we are presuming that anyone with cough or fever has coronavirus at this time.  Coronavirus/COVID-19 has reached the point of community spread in [place], meaning that we are finding the virus in people with no known exposure risk for katina the virus. Given the increasing commonness of coronavirus in the community we are no longer testing patients who are not critically ill.  If you are a health care worker, you should refer to your employee health office for instructions about testing and returning to work.  For everyone else who has cough or fever, you should assume you are infected with coronavirus. Since you will not be tested but have symptoms that may be consistent with coronavirus, the CDC recommends you stay in self-isolation until these three things have happened:    You have had no fever for at least 72 hours (that is three full days of no fever without the use of medicine that reduces fevers)    AND   Other symptoms have improved (for example, when your cough or shortness of breath have improved)   AND   At least 7 days have passed since your symptoms first appeared.   How to Isolate:    Isolate yourself at home.   Do Not allow any visitors  Do Not go to work or school  Do Not go to [facility],  centers, shopping, or other public places.  Do Not shake hands.  Avoid close contact with others (hugging, kissing).   Protect Others:    Cover Your Mouth and Nose with a mask, disposable tissue or wash cloth to avoid spreading germs to others.  Wash your hands and face frequently with soap and water.   Managing Symptoms:    At this time, we primarily recommend Tylenol (Acetaminophen) for fever or pain. If you have liver or kidney problems, contact your primary care provider for instructions on use of tylenol. Adults can take 650 mg (two 325 mg pills) by mouth every 4-6 hours as needed OR 1,000 mg (two 500 mg pills) every 8 hours as needed.  MAXIMUM DAILY DOSE: 3,000mg. For children, refer to dosing on bottle based on age or weight.   If you develop significant shortness of breath that prevents you from doing normal activities, please call 911 or proceed to the nearest emergency room and alert them immediately that you have been in self-isolation for possible coronavirus.   If you have a higher risk medical condition such as cancer, heart failure, end stage renal disease on dialysis or have a transplant, please reach out to your specialist's clinic to advise them of your [facility] visit should you not improve within the next two days.  For more information about COVID19 and options for caring for yourself at home, please visit the CDC website at https://www.cdc.gov/coronavirus/2019-ncov/about/steps-when-sick.htmlFor more options for care at [facility], please visit our website at [email or web address]     Diagnosis: Cough  Diagnosis ICD: R05

## 2020-03-28 ENCOUNTER — RESULTS ONLY (OUTPATIENT)
Dept: LAB | Age: 45
End: 2020-03-28

## 2020-03-28 ENCOUNTER — OFFICE VISIT (OUTPATIENT)
Dept: URGENT CARE | Facility: URGENT CARE | Age: 45
End: 2020-03-28
Payer: COMMERCIAL

## 2020-03-28 DIAGNOSIS — Z20.828 CONTACT W AND EXPOSURE TO OTH VIRAL COMMUNICABLE DISEASES: Primary | ICD-10-CM

## 2020-03-28 PROCEDURE — 99207 ZZC NO BILLABLE SERVICE THIS VISIT: CPT | Performed by: INTERNAL MEDICINE

## 2020-03-28 NOTE — PROGRESS NOTES
SUBJECTIVE:  This 44 year old  female is referred by [facility] for curbside testing for COVID-19.  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    OBJECTIVE:  Well appearing female  in no acute distress    NP swab collected.      ASSESSMENT/PLAN:    ICD-10-CM    1. Contact w and exposure to oth viral communicable diseases  Z20.828        [facility] will manage follow-up on test results and return to work.    Adrián Strange MD

## 2020-03-28 NOTE — PATIENT INSTRUCTIONS
[facility] Employee Health team will receive your Covid 19 test results in the next 1-4 days.  Once your results are received, Employee Health will call you with your test result and discuss your Return to Work timeline and criteria.

## 2020-03-31 LAB
COVID-19 VIRUS PCR TO MAYO - RESULT: ABNORMAL
SARS-COV-2 RNA SPEC QL NAA+PROBE: ABNORMAL
SPECIMEN SOURCE: ABNORMAL
SPECIMEN SOURCE: ABNORMAL

## 2020-04-03 ENCOUNTER — MYC MEDICAL ADVICE (OUTPATIENT)
Dept: FAMILY MEDICINE | Facility: CLINIC | Age: 45
End: 2020-04-03

## 2020-04-03 DIAGNOSIS — G47.00 INSOMNIA, UNSPECIFIED TYPE: Primary | ICD-10-CM

## 2020-04-03 DIAGNOSIS — F41.8 SITUATIONAL ANXIETY: ICD-10-CM

## 2020-04-03 RX ORDER — HYDROXYZINE PAMOATE 25 MG/1
25 CAPSULE ORAL 3 TIMES DAILY PRN
Qty: 120 CAPSULE | Refills: 1 | Status: SHIPPED | OUTPATIENT
Start: 2020-04-03 | End: 2021-11-17

## 2020-04-03 NOTE — TELEPHONE ENCOUNTER
Yes I will also fill 25 mg capsules she can take during the day for anxiety.     Please ask which pharmacy

## 2020-10-20 ENCOUNTER — HOSPITAL ENCOUNTER (OUTPATIENT)
Dept: MAMMOGRAPHY | Facility: CLINIC | Age: 45
Discharge: HOME | End: 2020-10-20
Attending: OBSTETRICS & GYNECOLOGY | Admitting: OBSTETRICS & GYNECOLOGY
Payer: COMMERCIAL

## 2020-10-20 DIAGNOSIS — Z12.31 ENCOUNTER FOR SCREENING MAMMOGRAM FOR BREAST CANCER: ICD-10-CM

## 2020-10-20 PROCEDURE — 77067 SCR MAMMO BI INCL CAD: CPT

## 2020-11-11 ENCOUNTER — OFFICE VISIT (OUTPATIENT)
Dept: FAMILY MEDICINE | Facility: CLINIC | Age: 45
End: 2020-11-11
Payer: COMMERCIAL

## 2020-11-11 VITALS
SYSTOLIC BLOOD PRESSURE: 118 MMHG | DIASTOLIC BLOOD PRESSURE: 72 MMHG | WEIGHT: 165.5 LBS | HEIGHT: 68 IN | BODY MASS INDEX: 25.08 KG/M2 | TEMPERATURE: 98.5 F | HEART RATE: 90 BPM | OXYGEN SATURATION: 96 %

## 2020-11-11 DIAGNOSIS — Z00.00 ROUTINE GENERAL MEDICAL EXAMINATION AT A HEALTH CARE FACILITY: Primary | ICD-10-CM

## 2020-11-11 DIAGNOSIS — Z91.09 ENVIRONMENTAL ALLERGIES: ICD-10-CM

## 2020-11-11 DIAGNOSIS — G47.00 INSOMNIA, UNSPECIFIED TYPE: ICD-10-CM

## 2020-11-11 PROCEDURE — 99396 PREV VISIT EST AGE 40-64: CPT | Performed by: NURSE PRACTITIONER

## 2020-11-11 RX ORDER — HYDROXYZINE PAMOATE 50 MG/1
50-100 CAPSULE ORAL
Qty: 180 CAPSULE | Refills: 3 | Status: SHIPPED | OUTPATIENT
Start: 2020-11-11 | End: 2021-11-19

## 2020-11-11 RX ORDER — SERTRALINE HYDROCHLORIDE 100 MG/1
100 TABLET, FILM COATED ORAL DAILY
COMMUNITY
Start: 2020-11-11 | End: 2021-11-17

## 2020-11-11 RX ORDER — FLUTICASONE PROPIONATE 50 MCG
1 SPRAY, SUSPENSION (ML) NASAL DAILY
Qty: 16 ML | Refills: 3 | Status: SHIPPED | OUTPATIENT
Start: 2020-11-11 | End: 2021-06-25

## 2020-11-11 RX ORDER — AZELASTINE 1 MG/ML
1 SPRAY, METERED NASAL AT BEDTIME
Qty: 30 ML | Refills: 11 | Status: SHIPPED | OUTPATIENT
Start: 2020-11-11 | End: 2021-11-19

## 2020-11-11 RX ORDER — PSEUDOEPHEDRINE HCL 30 MG
30 TABLET ORAL EVERY 4 HOURS PRN
Qty: 90 TABLET | Refills: 3 | Status: SHIPPED | OUTPATIENT
Start: 2020-11-11 | End: 2021-09-24

## 2020-11-11 ASSESSMENT — ENCOUNTER SYMPTOMS
FEVER: 0
FREQUENCY: 0
HEARTBURN: 0
DIZZINESS: 0
SHORTNESS OF BREATH: 0
PALPITATIONS: 0
DYSURIA: 0
WEAKNESS: 0
MYALGIAS: 0
HEMATOCHEZIA: 0
HEMATURIA: 0
COUGH: 0
CHILLS: 0
JOINT SWELLING: 0
CONSTIPATION: 1
NERVOUS/ANXIOUS: 0
EYE PAIN: 0
ARTHRALGIAS: 0
PARESTHESIAS: 0
ABDOMINAL PAIN: 0
HEADACHES: 0
BREAST MASS: 0
NAUSEA: 0
SORE THROAT: 0
DIARRHEA: 0

## 2020-11-11 ASSESSMENT — MIFFLIN-ST. JEOR: SCORE: 1429.71

## 2020-11-11 NOTE — PROGRESS NOTES
SUBJECTIVE:   CC: Aditi Osuna is an 45 year old woman who presents for preventive health visit.       Patient has been advised of split billing requirements and indicates understanding:      Answers for HPI/ROS submitted by the patient on 11/11/2020   Annual Exam:  Frequency of exercise:: None  Getting at least 3 servings of Calcium per day:: Yes  Diet:: Regular (no restrictions)  Taking medications regularly:: Yes  Medication side effects:: Not applicable  Bi-annual eye exam:: Yes  Dental care twice a year:: Yes  Sleep apnea or symptoms of sleep apnea:: None  abdominal pain: No  Blood in stool: No  Blood in urine: No  chest pain: No  chills: No  congestion: No  constipation: Yes  cough: No  diarrhea: No  dizziness: No  ear pain: No  eye pain: No  nervous/anxious: No  fever: No  frequency: No  genital sores: No  headaches: No  hearing loss: No  heartburn: No  arthralgias: No  joint swelling: No  peripheral edema: No  mood changes: No  myalgias: No  nausea: No  dysuria: No  palpitations: No  Skin sensation changes: No  sore throat: No  urgency: No  rash: No  shortness of breath: No  visual disturbance: No  weakness: No  pelvic pain: No  vaginal bleeding: No  vaginal discharge: No  tenderness: No  breast mass: No  breast discharge: No  Additional concerns today:: No      Needs refill of allergy meds she has working perfectly to prevent sinus infections in the winter   now and also had a patient suicide last fall so that was hard  Still working as counselor in   4 yr old   Just got mammo and pap done at her other clinic, they also do her zoloft   Some constipation at times, no exercise right now,  Mg Calm helps, also doing CBD and hydroxyzine for sleep/anxiety uses rare during the day   One dry brown lesion on right side of back/shoulder area has been unchanged and derm has looked at it said was benign     Today's PHQ-2 Score:   PHQ-2 ( 1999 Pfizer) 11/10/2020 10/30/2020   Q1: Little interest or  pleasure in doing things 0 0   Q2: Feeling down, depressed or hopeless 0 0   PHQ-2 Score 0 0   Q1: Little interest or pleasure in doing things Not at all Not at all   Q2: Feeling down, depressed or hopeless Not at all Not at all   PHQ-2 Score 0 0       Abuse: Current or Past(Physical, Sexual or Emotional)- No  Do you feel safe in your environment? Yes        Social History     Tobacco Use     Smoking status: Never Smoker     Smokeless tobacco: Never Used   Substance Use Topics     Alcohol use: Yes     If you drink alcohol do you typically have >3 drinks per day or >7 drinks per week? No                     Reviewed orders with patient.  Reviewed health maintenance and updated orders accordingly - Yes  Labs reviewed in EPIC  BP Readings from Last 3 Encounters:   11/11/20 118/72   12/10/19 119/72   11/13/19 122/80    Wt Readings from Last 3 Encounters:   11/11/20 75.1 kg (165 lb 8 oz)   12/10/19 73.9 kg (163 lb)   11/13/19 76 kg (167 lb 8 oz)                  Patient Active Problem List   Diagnosis     IUD (intrauterine device) in place     Insomnia, unspecified type     History reviewed. No pertinent surgical history.    Social History     Tobacco Use     Smoking status: Never Smoker     Smokeless tobacco: Never Used   Substance Use Topics     Alcohol use: Yes     Family History   Problem Relation Age of Onset     Other Cancer Maternal Grandfather      Heart Disease Paternal Grandfather      Osteoporosis Mother      Hypertension Father      Diabetes Father          Current Outpatient Medications   Medication Sig Dispense Refill     azelastine (ASTELIN) 0.1 % nasal spray Spray 1 spray into both nostrils At Bedtime 30 mL 11     azelastine (ASTELIN) 0.1 % nasal spray Spray 1 spray into both nostrils 2 times daily       cetirizine HCl 10 MG CAPS        fluticasone (FLONASE) 50 MCG/ACT nasal spray Spray 1 spray into both nostrils daily 16 mL 3     fluticasone (FLONASE) 50 MCG/ACT nasal spray Spray 2 sprays into both  "nostrils daily       glucosamine-chondroitin 500-400 MG CAPS Take 1 capsule by mouth daily       hydrOXYzine (VISTARIL) 25 MG capsule Take 1 capsule (25 mg) by mouth 3 times daily as needed for anxiety 120 capsule 1     hydrOXYzine (VISTARIL) 50 MG capsule Take 1-2 capsules ( mg) by mouth nightly as needed for other (sleep) 180 capsule 3     multivitamin, therapeutic with minerals (MULTI-VITAMIN) TABS Take 1 tablet by mouth daily       Omega-3 Fatty Acids (OMEGA-3 FISH OIL PO) Take 1 g by mouth       pseudoePHEDrine (SUDAFED) 30 MG tablet Take 1 tablet (30 mg) by mouth every 4 hours as needed for congestion 90 tablet 3     pseudoePHEDrine (SUDAFED) 60 MG tablet Take 60 mg by mouth every 4 hours as needed for congestion       sertraline (ZOLOFT) 100 MG tablet Take 1 tablet (100 mg) by mouth daily       VITAMIN D, CHOLECALCIFEROL, PO Take 2,000 Units by mouth daily       Allergies   Allergen Reactions     Augmentin Nausea and Vomiting     Sulfa Drugs Rash       Mammogram Screening: Patient under age 50, mutual decision reflected in health maintenance.      Pertinent mammograms are reviewed under the imaging tab.  History of abnormal Pap smear: NO - age 30-65 PAP every 5 years with negative HPV co-testing recommended     Reviewed and updated as needed this visit by clinical staff                 Reviewed and updated as needed this visit by Provider                History reviewed. No pertinent past medical history.   History reviewed. No pertinent surgical history.  OB History   No obstetric history on file.       ROS:  Constitutional, eye, ENT, skin, breast, respiratory, cardiac, GI, , MSK, neuro, psych, and allergy are normal except as otherwise noted.      OBJECTIVE:   /72   Pulse 90   Temp 98.5  F (36.9  C) (Temporal)   Ht 1.72 m (5' 7.72\")   Wt 75.1 kg (165 lb 8 oz)   LMP 11/09/2020 (Exact Date)   SpO2 96%   BMI 25.37 kg/m    EXAM:  GENERAL: healthy, alert and no distress  EYES: Eyes grossly " "normal to inspection, PERRL and conjunctivae and sclerae normal  HENT: ear canals and TM's normal, nose and mouth without ulcers or lesions  NECK: no adenopathy, no asymmetry, masses, or scars and thyroid normal to palpation  RESP: lungs clear to auscultation - no rales, rhonchi or wheezes  BREAST: normal without masses, tenderness or nipple discharge and no palpable axillary masses or adenopathy  CV: regular rate and rhythm, normal S1 S2, no S3 or S4, no murmur, click or rub, no peripheral edema and peripheral pulses strong  ABDOMEN: soft, nontender, no hepatosplenomegaly, no masses and bowel sounds normal  MS: no gross musculoskeletal defects noted, no edema  SKIN: no suspicious lesions or rashes  NEURO: Normal strength and tone, mentation intact and speech normal  PSYCH: mentation appears normal, affect normal/bright    Diagnostic Test Results:  Labs reviewed in Epic    ASSESSMENT/PLAN:       ICD-10-CM    1. Routine general medical examination at a health care facility  Z00.00    2. Environmental allergies  Z91.09 pseudoePHEDrine (SUDAFED) 30 MG tablet     azelastine (ASTELIN) 0.1 % nasal spray     fluticasone (FLONASE) 50 MCG/ACT nasal spray   3. Insomnia, unspecified type  G47.00 hydrOXYzine (VISTARIL) 50 MG capsule       Patient has been advised of split billing requirements and indicates understanding:   COUNSELING:   Reviewed preventive health counseling, as reflected in patient instructions    Estimated body mass index is 25.53 kg/m  as calculated from the following:    Height as of 11/13/19: 1.702 m (5' 7\").    Weight as of 12/10/19: 73.9 kg (163 lb).    Weight management plan: Discussed healthy diet and exercise guidelines    She reports that she has never smoked. She has never used smokeless tobacco.      Counseling Resources:  ATP IV Guidelines  Pooled Cohorts Equation Calculator  Breast Cancer Risk Calculator  BRCA-Related Cancer Risk Assessment: FHS-7 Tool  FRAX Risk Assessment  ICSI Preventive " Guidelines  Dietary Guidelines for Americans, 2010  USDA's MyPlate  ASA Prophylaxis  Lung CA Screening    NITIN Cosme CNP  [facility] PRIMARY CARE [place]

## 2021-01-08 ENCOUNTER — MYC MEDICAL ADVICE (OUTPATIENT)
Dept: FAMILY MEDICINE | Facility: CLINIC | Age: 46
End: 2021-01-08

## 2021-01-08 DIAGNOSIS — G47.00 INSOMNIA, UNSPECIFIED TYPE: ICD-10-CM

## 2021-01-08 RX ORDER — TRAZODONE HYDROCHLORIDE 50 MG/1
50-100 TABLET, FILM COATED ORAL AT BEDTIME
Qty: 180 TABLET | Refills: 0 | Status: SHIPPED | OUTPATIENT
Start: 2021-01-08 | End: 2021-03-30

## 2021-01-08 NOTE — TELEPHONE ENCOUNTER
[place]    See pt MyChart message    Med cued with dosing increase if you agree    Shefali Parada RN   [facility]

## 2021-02-23 ENCOUNTER — TELEPHONE (OUTPATIENT)
Dept: ORTHOPEDICS | Facility: CLINIC | Age: 46
End: 2021-02-23

## 2021-02-23 NOTE — TELEPHONE ENCOUNTER
Patient called to schedule an appointment in the walk in clinic for chronic left knee pain. She would like to come in on Saturday, 3/6. She has been scheduled with Dr. Bazzi at 10 am on 3/6. Address confirmed.

## 2021-02-24 NOTE — TELEPHONE ENCOUNTER
DIAGNOSIS: Chronic left knee pain.   APPOINTMENT DATE: 3.6.21   NOTES STATUS DETAILS   OFFICE NOTE from referring provider N/A    OFFICE NOTE from other specialist N/A    DISCHARGE SUMMARY from hospital N/A    DISCHARGE REPORT from the ER N/A    OPERATIVE REPORT N/A    EMG report N/A    MEDICATION LIST Internal    MRI N/A    DEXA (osteoporosis/bone health) N/A    CT SCAN N/A    XRAYS (IMAGES & REPORTS) N/A

## 2021-03-06 ENCOUNTER — ANCILLARY PROCEDURE (OUTPATIENT)
Dept: GENERAL RADIOLOGY | Facility: CLINIC | Age: 46
End: 2021-03-06
Attending: FAMILY MEDICINE
Payer: COMMERCIAL

## 2021-03-06 ENCOUNTER — OFFICE VISIT (OUTPATIENT)
Dept: ORTHOPEDICS | Facility: CLINIC | Age: 46
End: 2021-03-06
Payer: COMMERCIAL

## 2021-03-06 ENCOUNTER — PRE VISIT (OUTPATIENT)
Dept: ORTHOPEDICS | Facility: CLINIC | Age: 46
End: 2021-03-06

## 2021-03-06 VITALS — BODY MASS INDEX: 24.52 KG/M2 | HEIGHT: 68 IN | WEIGHT: 161.8 LBS

## 2021-03-06 DIAGNOSIS — M22.2X1 PATELLOFEMORAL PAIN SYNDROME OF BOTH KNEES: Primary | ICD-10-CM

## 2021-03-06 DIAGNOSIS — G89.29 CHRONIC PAIN OF BOTH KNEES: ICD-10-CM

## 2021-03-06 DIAGNOSIS — M22.2X2 PATELLOFEMORAL PAIN SYNDROME OF BOTH KNEES: Primary | ICD-10-CM

## 2021-03-06 DIAGNOSIS — M25.561 CHRONIC PAIN OF BOTH KNEES: ICD-10-CM

## 2021-03-06 DIAGNOSIS — M25.562 CHRONIC PAIN OF BOTH KNEES: ICD-10-CM

## 2021-03-06 PROCEDURE — 99203 OFFICE O/P NEW LOW 30 MIN: CPT | Performed by: FAMILY MEDICINE

## 2021-03-06 PROCEDURE — 73562 X-RAY EXAM OF KNEE 3: CPT | Mod: LT | Performed by: RADIOLOGY

## 2021-03-06 ASSESSMENT — MIFFLIN-ST. JEOR: SCORE: 1412.98

## 2021-03-06 NOTE — LETTER
3/6/2021         RE: Aditi Osuna  [address]  [facility] [facility] [address]        Dear Colleague,    Thank you for referring your patient, Aditi Osuna, to the [facility]TH[facility]. Please see a copy of my visit note below.      [facility]  SPORTS & ORTHOPEDIC CLINIC VISIT     Mar 6, 2021        ASSESSMENT & PLAN    45-year-old female with bilateral anterior knee pain is consistent with patellofemoral etiology.    Reviewed imaging and assessment with patient in detail  Discussed both short and long-term treatment strategies including activity modifications, as needed bracing, indication for oral medications.  Have provided with home exercise and recommended physical therapy.  Discussed the role for steroid injections.    Dallas Bazzi MD  [facility]    -----  Chief Complaint   Patient presents with     Left Knee - Pain       SUBJECTIVE  Aditi Osuna is a/an 45 year old female who is seen as a self referral for evaluation of left knee pain.    The patient is seen by themselves.    Onset: >10 years(s) ago. Reports insidious onset without acute precipitating event.  Has been very well managed over the past many years.  However left knee has been worse recently.  Location of Pain: left patellofemoral area.   Worsened by: stairs, walking  Better with: glucosamine-chondroitin  Treatments tried: other medications: glucosamine-chondroitin  Associated symptoms: Has recently begun having some clicking and popping in the anterior left knee.  No locking catching or instability.    Orthopedic/Surgical history: No  Social History/Occupation: Mental Health Therapist      REVIEW OF SYSTEMS:    Do you have fever, chills, weight loss? No    Do you have any vision problems? No    Do you have any chest pain or edema? No    Do you have any shortness of breath or wheezing?  No    Do you have stomach problems?  "No    Do you have any numbness or focal weakness? Yes, left hand in the AM.    Do you have diabetes? No    Do you have problems with bleeding or clotting? No    Do you have an rashes or other skin lesions? No    OBJECTIVE:  Ht 1.72 m (5' 7.72\")   Wt 73.4 kg (161 lb 12.8 oz)   BMI 24.81 kg/m       Patient is alert, No acute distress, pleasant and conversational.    Gait: nonantalgic. Normal heel toe gait.    bilateral knee:   Skin intact. No erythema or ecchymosis.  No effusion or soft tissue swelling.    AROM: Zero to approximately 135  without restriction or reported pain.  Crepitus noted bilaterally    Palpation: No medial or lateral facet joint tenderness.  No posterior medial or posterior lateral joint line tenderness     Special Tests:  Negative bounce test, negative forced flexion and negative Darcie's.  No ligamentous laxity or pain with valgus or varus stress.  Negative Lachman's, Anterior Drawer and Posterior Drawer     Full Isometric quad strength, extensor mechanism in place     Neurovascularly intact in the lower extremity    Hip and Ankle with full AROM and nontender      RADIOLOGY:    3 view xrays of bilateral knees performed and reviewed independently demonstrating no acute fracture or dislocation.  No significant degenerative disease.  Mild lateral patellar tilt bilaterally. See EMR for formal radiology report.       "

## 2021-03-06 NOTE — PROGRESS NOTES
"  [facility]  SPORTS & ORTHOPEDIC CLINIC VISIT     Mar 6, 2021        ASSESSMENT & PLAN    45-year-old female with bilateral anterior knee pain is consistent with patellofemoral etiology.    Reviewed imaging and assessment with patient in detail  Discussed both short and long-term treatment strategies including activity modifications, as needed bracing, indication for oral medications.  Have provided with home exercise and recommended physical therapy.  Discussed the role for steroid injections.    Dallas Bazzi MD  [facility] ORTHOPEDIC [facility]    -----  Chief Complaint   Patient presents with     Left Knee - Pain       SUBJECTIVE  Aditi Osuna is a/an 45 year old female who is seen as a self referral for evaluation of left knee pain.    The patient is seen by themselves.    Onset: >10 years(s) ago. Reports insidious onset without acute precipitating event.  Has been very well managed over the past many years.  However left knee has been worse recently.  Location of Pain: left patellofemoral area.   Worsened by: stairs, walking  Better with: glucosamine-chondroitin  Treatments tried: other medications: glucosamine-chondroitin  Associated symptoms: Has recently begun having some clicking and popping in the anterior left knee.  No locking catching or instability.    Orthopedic/Surgical history: No  Social History/Occupation: Mental Health Therapist      REVIEW OF SYSTEMS:    Do you have fever, chills, weight loss? No    Do you have any vision problems? No    Do you have any chest pain or edema? No    Do you have any shortness of breath or wheezing?  No    Do you have stomach problems? No    Do you have any numbness or focal weakness? Yes, left hand in the AM.    Do you have diabetes? No    Do you have problems with bleeding or clotting? No    Do you have an rashes or other skin lesions? No    OBJECTIVE:  Ht 1.72 m (5' 7.72\")   Wt 73.4 kg (161 lb 12.8 oz)   BMI " 24.81 kg/m       Patient is alert, No acute distress, pleasant and conversational.    Gait: nonantalgic. Normal heel toe gait.    bilateral knee:   Skin intact. No erythema or ecchymosis.  No effusion or soft tissue swelling.    AROM: Zero to approximately 135  without restriction or reported pain.  Crepitus noted bilaterally    Palpation: No medial or lateral facet joint tenderness.  No posterior medial or posterior lateral joint line tenderness     Special Tests:  Negative bounce test, negative forced flexion and negative Darcie's.  No ligamentous laxity or pain with valgus or varus stress.  Negative Lachman's, Anterior Drawer and Posterior Drawer     Full Isometric quad strength, extensor mechanism in place     Neurovascularly intact in the lower extremity    Hip and Ankle with full AROM and nontender      RADIOLOGY:    3 view xrays of bilateral knees performed and reviewed independently demonstrating no acute fracture or dislocation.  No significant degenerative disease.  Mild lateral patellar tilt bilaterally. See EMR for formal radiology report.

## 2021-03-29 ENCOUNTER — E-VISIT (OUTPATIENT)
Dept: URGENT CARE | Facility: CLINIC | Age: 46
End: 2021-03-29
Payer: COMMERCIAL

## 2021-03-29 DIAGNOSIS — Z20.822 CLOSE EXPOSURE TO 2019 NOVEL CORONAVIRUS: Primary | ICD-10-CM

## 2021-03-29 PROCEDURE — 99421 OL DIG E/M SVC 5-10 MIN: CPT | Performed by: PHYSICIAN ASSISTANT

## 2021-03-29 NOTE — PATIENT INSTRUCTIONS
"  Dear Aditi Osuna,    Based on your exposure to COVID-19 (coronavirus), we would like to test you for this virus. I have placed an order for this test. The best time for testing is 5-7 days after the exposure.    How to schedule:  For all employees or close contacts (except [facility] and [employer] - see below), go to your [employer] home page and scroll down to the section that says  You have an appointment that needs to be scheduled  and click the large green button that says  Schedule Now  and follow the steps to find the next available opening.     If you are unable to complete these steps or if you cannot find any available times, please call [phone] to schedule employee testing.     [facility] employees or close contacts, please call [phone].   [facility] ([employer]) employees or close contacts call [phone].    Return to work/school/ guidance:   For people with high risk exposures outside the home    Please let your workplace manager and staffing office know when your quarantine ends.     We can not give you an exact date as it depends on the information below. You can calculate this on your own or work with your manager/staffing office to calculate this. (For example if you were exposed on 10/4, you would have to quarantine for 14 full days. That would be through 10/18. You could return on 10/19.)    Quarantine Guidelines:  Patients (\"contacts\") who have been in close prolonged contact of an infected person(s) (within six feet for at least 15 minutes within a 24 hour period), and remain asymptomatic should enter quarantine based on the following options:    14-day quarantine period (this remains the CDC recommendation for the greatest protection against spread of COVID-19) OR    Minimum 7-day quarantine with negative RT-PCR test collected on day 5 or later OR    10-day quarantine with no test  Quarantine Guideline exceptions are as follows:    People who have been fully vaccinated do not " need to quarantine if the exposure was at least 2 weeks after the last vaccination. This includes vaccinated health care workers.    Not fully vaccinated and unvaccinated Individuals who work in health care, congregate care, or congregate living should be off work for 14 days from their last date of exposure. Community activities for this group can be resumed based on options above. Fully vaccinated individuals in this group do not need to quarantine from work after exposure.    Not fully vaccinated and unvaccinated people whose high-risk exposure was a household member should always quarantine for 14 days from their last date of exposure. Fully vaccinated people in this category do not need to quarantine.    Not fully vaccinated or unvaccinated residents of congregate care and congregate living settings should always quarantine for 14 days from their last date of exposure. Fully vaccinated residents do not need to quarantine.    Note: If you have ongoing exposure to the covid positive person, this quarantine period may be more than 14 days. (For example, if you are continued to be exposed to your child who tested positive and cannot isolate from them, then the quarantine of 7-14 days can't start until your child is no longer contagious. This is typically 10 days from onset of the child's symptoms. So the total duration may be 17-24 days in this case.)    You should continue symptom monitoring until day 14 post-exposure. If you develop signs or symptoms of COVID-19, isolate and get tested (even if you have been tested already).    How to quarantine:   Stay home and away from others. Don't go to school or anywhere else. Generally quarantine means staying home from work but there are some exceptions to this. Please contact your workplace.  No hugging, kissing or shaking hands.  Don't let anyone visit.  Cover your mouth and nose with a mask, tissue or washcloth to avoid spreading germs.  Wash your hands and face often.  Use soap and water.    What are the symptoms of COVID-19?  The most common symptoms are cough, fever and trouble breathing. Less common symptoms include headache, body aches, fatigue (feeling very tired), chills, sore throat, stuffy or runny nose, diarrhea (loose poop), loss of taste or smell, belly pain, and nausea or vomiting (feeling sick to your stomach or throwing up).  After 14 days, if you have still don't have symptoms, you likely don't have this virus.  If you develop symptoms, follow these guidelines.  If you're normally healthy: Please start another eVisit.  If you have a serious health problem (like cancer, heart failure, an organ transplant or kidney disease): Call your specialty clinic. Let them know that you might have COVID-19.    Where can I get more information?  [facility] - About COVID-19: [email or web address]  CDC - What to Do If You're Sick: www.cdc.gov/coronavirus/2019-ncov/about/steps-when-sick.html  CDC - Ending Home Isolation: www.cdc.gov/coronavirus/2019-ncov/hcp/disposition-in-home-patients.html  CDC - Caring for Someone: www.cdc.gov/coronavirus/2019-ncov/if-you-are-sick/care-for-someone.html  [facility] clinical trials (COVID-19 research studies): clinicalaffairs.[facility]-clinical-trials  Below are the COVID-19 hotlines at the [facility] ([facility]). Interpreters are available.  For health questions: Call 646-793-4114 or 1-818.628.7349 (7 a.m. to 7 p.m.)  For questions about schools and childcare: Call 822-644-2410 or 1-627.930.7418 (7 a.m. to 7 p.m.)

## 2021-03-30 ENCOUNTER — MYC REFILL (OUTPATIENT)
Dept: FAMILY MEDICINE | Facility: CLINIC | Age: 46
End: 2021-03-30

## 2021-03-30 DIAGNOSIS — G47.00 INSOMNIA, UNSPECIFIED TYPE: ICD-10-CM

## 2021-03-31 DIAGNOSIS — Z20.822 CLOSE EXPOSURE TO 2019 NOVEL CORONAVIRUS: ICD-10-CM

## 2021-03-31 LAB
LABORATORY COMMENT REPORT: NORMAL
SARS-COV-2 RNA RESP QL NAA+PROBE: NEGATIVE
SARS-COV-2 RNA RESP QL NAA+PROBE: NORMAL
SPECIMEN SOURCE: NORMAL
SPECIMEN SOURCE: NORMAL

## 2021-03-31 PROCEDURE — 87635 SARS-COV-2 COVID-19 AMP PRB: CPT | Performed by: PHYSICIAN ASSISTANT

## 2021-03-31 RX ORDER — TRAZODONE HYDROCHLORIDE 50 MG/1
50-100 TABLET, FILM COATED ORAL AT BEDTIME
Qty: 180 TABLET | Refills: 0 | Status: SHIPPED | OUTPATIENT
Start: 2021-03-31 | End: 2021-06-25

## 2021-03-31 RX ORDER — TRAZODONE HYDROCHLORIDE 50 MG/1
50-100 TABLET, FILM COATED ORAL AT BEDTIME
Qty: 180 TABLET | Refills: 0 | OUTPATIENT
Start: 2021-03-31

## 2021-03-31 NOTE — TELEPHONE ENCOUNTER
"Requested Prescriptions   Signed Prescriptions Disp Refills    traZODone (DESYREL) 50 MG tablet 180 tablet 0     Sig: Take 1-2 tablets ( mg) by mouth At Bedtime       Serotonin Modulators Passed - 3/30/2021 11:17 AM        Passed - Recent (12 mo) or future (30 days) visit within the authorizing provider's specialty     Patient has had an office visit with the authorizing provider or a provider within the authorizing providers department within the previous 12 mos or has a future within next 30 days. See \"Patient Info\" tab in inbasket, or \"Choose Columns\" in Meds & Orders section of the refill encounter.              Passed - Medication is active on med list        Passed - Patient is age 18 or older        Passed - No active pregnancy on record        Passed - No positive pregnancy test in past 12 months           Charley Briscoe RN  [facility]    "

## 2021-04-23 ENCOUNTER — THERAPY VISIT (OUTPATIENT)
Dept: PHYSICAL THERAPY | Facility: CLINIC | Age: 46
End: 2021-04-23
Payer: COMMERCIAL

## 2021-04-23 DIAGNOSIS — M22.2X2 PATELLOFEMORAL PAIN SYNDROME OF BOTH KNEES: ICD-10-CM

## 2021-04-23 DIAGNOSIS — G89.29 CHRONIC PAIN OF BOTH KNEES: ICD-10-CM

## 2021-04-23 DIAGNOSIS — M22.2X1 PATELLOFEMORAL PAIN SYNDROME OF BOTH KNEES: ICD-10-CM

## 2021-04-23 DIAGNOSIS — M25.562 CHRONIC PAIN OF BOTH KNEES: ICD-10-CM

## 2021-04-23 DIAGNOSIS — M25.561 CHRONIC PAIN OF BOTH KNEES: ICD-10-CM

## 2021-04-23 PROCEDURE — 97161 PT EVAL LOW COMPLEX 20 MIN: CPT | Mod: GP | Performed by: PHYSICAL THERAPIST

## 2021-04-23 PROCEDURE — 97110 THERAPEUTIC EXERCISES: CPT | Mod: GP | Performed by: PHYSICAL THERAPIST

## 2021-04-23 ASSESSMENT — ACTIVITIES OF DAILY LIVING (ADL)
SWELLING: I DO NOT HAVE THE SYMPTOM
KNEE_ACTIVITY_OF_DAILY_LIVING_SCORE: 82.86
WALK: ACTIVITY IS MINIMALLY DIFFICULT
HOW_WOULD_YOU_RATE_THE_OVERALL_FUNCTION_OF_YOUR_KNEE_DURING_YOUR_USUAL_DAILY_ACTIVITIES?: ABNORMAL
PAIN: I HAVE THE SYMPTOM BUT IT DOES NOT AFFECT MY ACTIVITY
KNEE_ACTIVITY_OF_DAILY_LIVING_SUM: 58
SQUAT: ACTIVITY IS SOMEWHAT DIFFICULT
KNEEL ON THE FRONT OF YOUR KNEE: ACTIVITY IS MINIMALLY DIFFICULT
HOW_WOULD_YOU_RATE_THE_CURRENT_FUNCTION_OF_YOUR_KNEE_DURING_YOUR_USUAL_DAILY_ACTIVITIES_ON_A_SCALE_FROM_0_TO_100_WITH_100_BEING_YOUR_LEVEL_OF_KNEE_FUNCTION_PRIOR_TO_YOUR_INJURY_AND_0_BEING_THE_INABILITY_TO_PERFORM_ANY_OF_YOUR_USUAL_DAILY_ACTIVITIES?: 80
LIMPING: I DO NOT HAVE THE SYMPTOM
RAW_SCORE: 58
WEAKNESS: I DO NOT HAVE THE SYMPTOM
SIT WITH YOUR KNEE BENT: ACTIVITY IS MINIMALLY DIFFICULT
GIVING WAY, BUCKLING OR SHIFTING OF KNEE: I HAVE THE SYMPTOM BUT IT DOES NOT AFFECT MY ACTIVITY
STIFFNESS: I HAVE THE SYMPTOM BUT IT DOES NOT AFFECT MY ACTIVITY
STAND: ACTIVITY IS NOT DIFFICULT
GO UP STAIRS: ACTIVITY IS MINIMALLY DIFFICULT
GO DOWN STAIRS: ACTIVITY IS MINIMALLY DIFFICULT
AS_A_RESULT_OF_YOUR_KNEE_INJURY,_HOW_WOULD_YOU_RATE_YOUR_CURRENT_LEVEL_OF_DAILY_ACTIVITY?: NEARLY NORMAL
RISE FROM A CHAIR: ACTIVITY IS SOMEWHAT DIFFICULT

## 2021-04-23 NOTE — PROGRESS NOTES
[facility] Physical Therapy Initial Evaluation  4/23/2021     Precautions/Restrictions/MD instructions: Eval and Treat for B knee pain    Therapist Assessment: Aditi Osuna is a 45 year old female patient presenting to Physical Therapy with B knee pain. Patient demonstrates popping/clicking with deep weighted knee flexion, as well as weakened glutes and tight quadriceps. Signs and symptoms are consistent with likely B patellar mal-tracking. These impairments limit their ability to perform stairs, walk for longer periods of time, and bike. Skilled PT services are necessary in order to reduce impairments and improve independent function.    Subjective:     Injury/Condition Details:  Presenting Complaint B knees (L worse than R)   Onset Timing/Date Years prior; MD referral from 3/6/2021.   Mechanism No specific mechanism of injury, but pt wonders if her lack of exercises during the pandemic increased her knee discomfort.     Symptom Behavior Details    Primary Symptoms Constant symptoms; worsen with activity, pain (Location: behind both knees, Quality: Sharp and Aching/Throbbing), stiffness, catching/locking, weakness, buckling/shifting/giving way   Worst Pain 3-4/10   Symptom Provocators Stairs, walking longer distances, squatting (up from squat), bike riding   Best Pain 0/10    Symptom Relievers Ibuprofen, rest   Time of day dependent? No   Recent symptom change? symptoms worsening     Prior Testing/Intervention for current condition:  Prior Tests  x-ray on 3/6/2021:  Impression:  1. No acute osseous abnormality bilaterally.  2. No substantial degenerative change, bilaterally.     JUTTA ELLERMANN, MD   Prior Treatment none     Lifestyle & General Medical History:  Employment Mental Health Therapist   Usual physical activities  (within past year) Computer work, prolonged sitting, repetitive tasks   Orthopaedic History  None   Medication  Sleep, allergy, and anxiety meds   Notable medical history See Epic  Chart; None   Patient goals Walking, bike riding, taking stairs at work   Patient Reported Health excellent     Red Flags: (Bold when present) - reviewed the following and denies  Calf pain/swelling/warmth, malaise, unexplained weight loss, night pain, fever    Objective:    Posture: no notable deficits    Functional:   - DL squat: knees over toes, with heels rising from floor; able to achieve deep knee flexion with non-painful popping/cracking B  - SL squat: difficulty controlling knees in space B; notable, non-painful cracking/popping B   - Repeated single-leg squat after McConnel taping to glide patella medially with no major reduction in popping/cracking, nor in discomfort     SL Balance:   R: 30 sec  L: 30 sec    Palpation: no tenderness to palpation throughout knee    Other:  - Swelling: none noted    KNEE: (* indicates patient's primary complaint)   PROM R PROM L AROM R AROM L MMT R MMT L   Ext   3 2 5 5   Flx   135 140 5 4+   Quad flexibility: mild tightness noted B    Quadriceps Muscle Activation Right Left   Isometric Quad Activation Normal Normal   Straight Leg Raising No extensor lag No extensor lag       HIP: (* indicates patient's primary complaint)   PROM R PROM L MMT R MMT L   FLX   5 5   ABD   4 3+   EXT   4 4     Special tests:   L R   Anterior Drawer     Posterior Drawer     Lachman's - -   Valgus 0 degrees - -   Valgus 30 degrees - -   Varus 0 degrees - -   Varus 30 degrees - -   Darcie's     Appley's     Lateral Compression     Patellar Compression - -       ASSESSMENT/PLAN  Patient is a 45 year old female with B knee complaints.    Patient has the following significant findings with corresponding treatment plan.                Diagnosis 1:  B knee pain; signs and symptoms consistent with likely B patellar mal-tracking    Pain -  hot/cold therapy, US, electric stimulation, manual therapy, splint/taping/bracing/orthotics, self management, education and home program  Decreased ROM/flexibility -  manual therapy, therapeutic exercise and home program  Decreased strength - therapeutic exercise, therapeutic activities and home program  Decreased function - therapeutic activities and home program    Therapy Evaluation Codes:   1) History comprised of:   Personal factors that impact the plan of care:      Time since onset of symptoms.    Comorbidity factors that impact the plan of care are:      None.     Medications impacting care: Sleep.  2) Examination of Body Systems comprised of:   Body structures and functions that impact the plan of care:      Knee.   Activity limitations that impact the plan of care are:      Sports, Squatting/kneeling, Stairs, Standing and Walking.  3) Clinical presentation characteristics are:   Stable/Uncomplicated.  4) Decision-Making    Low complexity using standardized patient assessment instrument and/or measureable assessment of functional outcome.  Cumulative Therapy Evaluation is: Low complexity.    Previous and current functional limitations:  (See Goal Flow Sheet for this information)    Short term and Long term goals: (See Goal Flow Sheet for this information)     Communication ability:  Patient appears to be able to clearly communicate and understand verbal and written communication and follow directions correctly.  Treatment Explanation - The following has been discussed with the patient:   RX ordered/plan of care  Anticipated outcomes  Possible risks and side effects  This patient would benefit from PT intervention to resume normal activities.   Rehab potential is good.    Frequency:  1 X week, once daily  Duration:  for 4 weeks; tapering to 2x/month for 2 months  Discharge Plan:  Achieve all LTG.  Independent in home treatment program.  Reach maximal therapeutic benefit.    Please refer to the daily flowsheet for treatment today, total treatment time and time spent performing 1:1 timed codes.

## 2021-05-07 ENCOUNTER — THERAPY VISIT (OUTPATIENT)
Dept: PHYSICAL THERAPY | Facility: CLINIC | Age: 46
End: 2021-05-07
Payer: COMMERCIAL

## 2021-05-07 DIAGNOSIS — M22.2X2 PATELLOFEMORAL PAIN SYNDROME OF BOTH KNEES: ICD-10-CM

## 2021-05-07 DIAGNOSIS — G89.29 CHRONIC PAIN OF BOTH KNEES: ICD-10-CM

## 2021-05-07 DIAGNOSIS — M22.2X1 PATELLOFEMORAL PAIN SYNDROME OF BOTH KNEES: ICD-10-CM

## 2021-05-07 DIAGNOSIS — M25.562 CHRONIC PAIN OF BOTH KNEES: ICD-10-CM

## 2021-05-07 DIAGNOSIS — M25.561 CHRONIC PAIN OF BOTH KNEES: ICD-10-CM

## 2021-05-07 PROCEDURE — 97110 THERAPEUTIC EXERCISES: CPT | Mod: GP | Performed by: PHYSICAL THERAPIST

## 2021-05-07 PROCEDURE — 97140 MANUAL THERAPY 1/> REGIONS: CPT | Mod: GP | Performed by: PHYSICAL THERAPIST

## 2021-06-23 DIAGNOSIS — Z91.09 ENVIRONMENTAL ALLERGIES: ICD-10-CM

## 2021-06-23 DIAGNOSIS — G47.00 INSOMNIA, UNSPECIFIED TYPE: ICD-10-CM

## 2021-06-24 DIAGNOSIS — G47.00 INSOMNIA, UNSPECIFIED TYPE: ICD-10-CM

## 2021-06-25 RX ORDER — TRAZODONE HYDROCHLORIDE 50 MG/1
50-100 TABLET, FILM COATED ORAL AT BEDTIME
Qty: 180 TABLET | Refills: 0 | Status: SHIPPED | OUTPATIENT
Start: 2021-06-25 | End: 2021-09-22

## 2021-06-25 RX ORDER — FLUTICASONE PROPIONATE 50 MCG
1 SPRAY, SUSPENSION (ML) NASAL DAILY
Qty: 16 ML | Refills: 3 | Status: SHIPPED | OUTPATIENT
Start: 2021-06-25 | End: 2021-11-19

## 2021-06-25 RX ORDER — TRAZODONE HYDROCHLORIDE 50 MG/1
50-100 TABLET, FILM COATED ORAL AT BEDTIME
Qty: 180 TABLET | Refills: 0 | Status: SHIPPED | OUTPATIENT
Start: 2021-06-25 | End: 2021-11-17

## 2021-08-03 ENCOUNTER — E-VISIT (OUTPATIENT)
Dept: URGENT CARE | Facility: URGENT CARE | Age: 46
End: 2021-08-03
Payer: COMMERCIAL

## 2021-08-03 DIAGNOSIS — Z20.822 SUSPECTED COVID-19 VIRUS INFECTION: ICD-10-CM

## 2021-08-03 DIAGNOSIS — Z20.822 SUSPECTED COVID-19 VIRUS INFECTION: Primary | ICD-10-CM

## 2021-08-03 LAB — SARS-COV-2 RNA RESP QL NAA+PROBE: NEGATIVE

## 2021-08-03 PROCEDURE — U0005 INFEC AGEN DETEC AMPLI PROBE: HCPCS

## 2021-08-03 PROCEDURE — U0003 INFECTIOUS AGENT DETECTION BY NUCLEIC ACID (DNA OR RNA); SEVERE ACUTE RESPIRATORY SYNDROME CORONAVIRUS 2 (SARS-COV-2) (CORONAVIRUS DISEASE [COVID-19]), AMPLIFIED PROBE TECHNIQUE, MAKING USE OF HIGH THROUGHPUT TECHNOLOGIES AS DESCRIBED BY CMS-2020-01-R: HCPCS

## 2021-08-03 PROCEDURE — 99421 OL DIG E/M SVC 5-10 MIN: CPT | Performed by: NURSE PRACTITIONER

## 2021-08-03 NOTE — PATIENT INSTRUCTIONS
Dear Aditi Osuna,    Your symptoms show that you may have coronavirus (COVID-19). This illness can cause fever, cough and trouble breathing. Many people get a mild case and get better on their own. Some people can get very sick.    Will I be tested for COVID-19?  We would like to test you for Covid-19 virus. I have placed orders for this test.     For all employees or close contacts (except [facility] and [employer] - see below), go to your [employer] home page and scroll down to the section that says  You have an appointment that needs to be scheduled  and click the large green button that says  Schedule Now  and follow the steps to find the next available opening.     If you are unable to complete these steps or if you cannot find any available times, please call [phone] to schedule employee testing.     [facility] employees or close contacts, please call [phone].   [facility] [employer]) employees or close contacts call [phone].    Return to work/school/ guidance:  Please let your workplace manager and staffing office know when your quarantine ends     We can t give you an exact date as it depends on the above. You can calculate this on your own or work with your manager/staffing office to calculate this. (For example if you were exposed on 10/4, you would have to quarantine for 14 full days. That would be through 10/18. You could return on 10/19.)      If you receive a positive COVID-19 test result, follow the guidance of the those who are giving you the results. Usually the return to work is 10 (or in some cases 20 days from symptom onset.) If you work at [employer], you must also be cleared by Employee Occupational Health and Safety to return to work.        If you receive a negative COVID-19 test result and did not have a high risk exposure to someone with a known positive COVID-19 test, you can return to work once you're free of fever for 24 hours without fever-reducing medication and  your symptoms are improving or resolved.      If you receive a negative COVID-19 test and If you had a high risk exposure to someone who has tested positive for COVID-19 then you can return to work 14 days after your last contact with the positive individual    Note: If you have ongoing exposure to the covid positive person, this quarantine period may be more than 14 days. (For example, if you are continued to be exposed to your child who tested positive and cannot isolate from them, then the quarantine of 7-14 days can't start until your child is no longer contagious. This is typically 10 days from onset of the child's symptoms. So the total duration may be 17-24 days in this case.)    Sign up for [employer].   We know it's scary to hear that you might have COVID-19. We want to track your symptoms to make sure you're okay over the next 2 weeks. Please look for an email from [employer]--this is a free, online program that we'll use to keep in touch. To sign up, follow the link in the email you will receive. Learn more at [email or web address]    How can I take care of myself?    Get lots of rest. Drink extra fluids (unless a doctor has told you not to)    Take Tylenol (acetaminophen) or ibuprofen for fever or pain. If you have liver or kidney problems, ask your family doctor if it's okay to take Tylenol o ibuprofen    If you have other health problems (like cancer, heart failure, an organ transplant or severe kidney disease): Call your specialty clinic if you don't feel better in the next 2 days.    Know when to call 911. Emergency warning signs include:  o Trouble breathing or shortness of breath  o Pain or pressure in the chest that doesn't go away  o Feeling confused like you haven't felt before, or not being able to wake up  o Bluish-colored lips or face    Where can I get more information?   [facility] - About COVID-19:   [email or web address]    CDC - What to Do If You're Sick:    www.cdc.gov/coronavirus/2019-ncov/about/steps-when-sick.html

## 2021-08-12 ENCOUNTER — THERAPY VISIT (OUTPATIENT)
Dept: PHYSICAL THERAPY | Facility: CLINIC | Age: 46
End: 2021-08-12
Attending: NURSE PRACTITIONER
Payer: COMMERCIAL

## 2021-08-12 DIAGNOSIS — N39.46 MIXED STRESS AND URGE URINARY INCONTINENCE: ICD-10-CM

## 2021-08-12 PROCEDURE — 97530 THERAPEUTIC ACTIVITIES: CPT | Mod: GP | Performed by: PHYSICAL THERAPIST

## 2021-08-12 PROCEDURE — 97110 THERAPEUTIC EXERCISES: CPT | Mod: GP | Performed by: PHYSICAL THERAPIST

## 2021-08-12 PROCEDURE — 97161 PT EVAL LOW COMPLEX 20 MIN: CPT | Mod: GP | Performed by: PHYSICAL THERAPIST

## 2021-08-12 NOTE — PROGRESS NOTES
Physical Therapy Initial Evaluation  Subjective:    Patient Health History  Aditi Osuna being seen for pelvic PT.     Date of Onset: .   Problem occurred: pregnancy   Pain is reported as 0/10 on pain scale.  General health as reported by patient is excellent.  Pertinent medical history includes: changes in bowel/bladder.   Red flags:  None as reported by patient.  Medical allergies: none.   Surgeries include:  None.    Current medications:  Anti-depressants and other. Other medications details: anti-anxiety and allergies.    Current occupation is psychotherapist.   Primary job tasks include:  Computer work, prolonged sitting and repetitive tasks.                  Therapist Generated HPI Evaluation  Problem details:  (DOD ) reports a lot of coughing during pregnancy resulting in incontinence;  with back labor.  6 pounds 9 ounces.  Pt reports continued DURGA with sneezing, coughing, running and requiring use of pad everyday.  2019:  did 3 Aparna Michelle touch treatments and noted some improvement, but still experiences DURGA with exercise.    Urination: good stream; able to stop flow; urinary frequency is high-every hour.  Sleeping through the night.  Water intake is good.    Bowel: constipation increased since juan manuel-menopause.  Takes magnesium which helps.    Pelvic pain: none    Activity/exercise: goal to reduce leakage with DURGA.  .         Type of problem:  Incontinence.    This is a chronic condition.  Condition occurred with:  During pregnancy.  Where condition occurred: other.  Patient reports pain:  N/a.      Since onset symptoms are unchanged.  Symptoms are exacerbated by laughing, coughing, sneezing, running, jumping and walking  and relieved by other.    Previous treatment includes other. There was mild improvement following previous treatment.  Restrictions due to condition include:  Working in normal job without restrictions.  Barriers include:  None as reported by patient.                         Objective:  System                                 Pelvic Dysfunction Evaluation:            Pelvic Clock Exam:  normal                External Assessment:    Skin Condition:  Normal    Bearing Down/Coughing:  Normal  Tissue Symmetry:  Normal  Introitus:  Normal  Muscle Contraction/Perineal Mobility:  Elevation and urogential triangle descent  Internal Assessment:    Sensory Exam:  Normal  Contraction/Grade:  Fair squeeze, definite lift (3)    Accessory Muscle use-Gluteals:  Yes                           General     ROS    Assessment/Plan:    Patient is a 46 year old female with pelvic complaints.    Patient has the following significant findings with corresponding treatment plan.                Diagnosis 1:  UI  Decreased strength - therapeutic exercise and therapeutic activities  Decreased proprioception - neuro re-education and therapeutic activities  Impaired muscle performance - biofeedback, neuro re-education and home program  Decreased function - therapeutic activities    Therapy Evaluation Codes:   1) History comprised of:   Personal factors that impact the plan of care:      Time since onset of symptoms.    Comorbidity factors that impact the plan of care are:      None.     Medications impacting care: None.  2) Examination of Body Systems comprised of:   Body structures and functions that impact the plan of care:      Pelvis.   Activity limitations that impact the plan of care are:      Urinary incontinence.  3) Clinical presentation characteristics are:   Stable/Uncomplicated.  4) Decision-Making    Low complexity using standardized patient assessment instrument and/or measureable assessment of functional outcome.  Cumulative Therapy Evaluation is: Low complexity.    Previous and current functional limitations:  (See Goal Flow Sheet for this information)    Short term and Long term goals: (See Goal Flow Sheet for this information)     Communication ability:  Patient appears to be able to clearly  communicate and understand verbal and written communication and follow directions correctly.  Treatment Explanation - The following has been discussed with the patient:   RX ordered/plan of care  Anticipated outcomes  Possible risks and side effects  This patient would benefit from PT intervention to resume normal activities.   Rehab potential is good.    Frequency:  1 X week, once daily every 3 weeks  Duration:  for 12 weeks  Discharge Plan:  Achieve all LTG.  Independent in home treatment program.  Reach maximal therapeutic benefit.    Please refer to the daily flowsheet for treatment today, total treatment time and time spent performing 1:1 timed codes.

## 2021-09-22 DIAGNOSIS — Z91.09 ENVIRONMENTAL ALLERGIES: ICD-10-CM

## 2021-09-22 DIAGNOSIS — G47.00 INSOMNIA, UNSPECIFIED TYPE: ICD-10-CM

## 2021-09-22 RX ORDER — TRAZODONE HYDROCHLORIDE 50 MG/1
50-100 TABLET, FILM COATED ORAL AT BEDTIME
Qty: 180 TABLET | Refills: 0 | Status: SHIPPED | OUTPATIENT
Start: 2021-09-22 | End: 2021-11-19

## 2021-09-22 NOTE — TELEPHONE ENCOUNTER
"Requested Prescriptions   Signed Prescriptions Disp Refills    traZODone (DESYREL) 50 MG tablet 180 tablet 0     Sig: Take 1-2 tablets ( mg) by mouth At Bedtime       Serotonin Modulators Passed - 9/22/2021  7:56 AM        Passed - Recent (12 mo) or future (30 days) visit within the authorizing provider's specialty     Patient has had an office visit with the authorizing provider or a provider within the authorizing providers department within the previous 12 mos or has a future within next 30 days. See \"Patient Info\" tab in inbasket, or \"Choose Columns\" in Meds & Orders section of the refill encounter.              Passed - Medication is active on med list        Passed - Patient is age 18 or older        Passed - No active pregnancy on record        Passed - No positive pregnancy test in past 12 months           Charley Briscoe RN  [facility]    "

## 2021-09-22 NOTE — TELEPHONE ENCOUNTER
[place] [facility],    Requested Prescriptions   Pending Prescriptions Disp Refills     pseudoePHEDrine (SUDAFED) 30 MG tablet 90 tablet 3     Sig: Take 1 tablet (30 mg) by mouth every 4 hours as needed for congestion       There is no refill protocol information for this order        ThanksThi RN  [facility]

## 2021-09-22 NOTE — TELEPHONE ENCOUNTER
Reason for Call:  Medication or medication refill:    Do you use a [facility]?  Name of the pharmacy and phone number for the current request:    [facility] [place], [place] - [address] [facility] (Pharmacy) [phone]         Name of the medication requested: traZODone (DESYREL) 50 MG tablet    Other request: NA    Can we leave a detailed message on this number? YES    Phone number patient can be reached at: Cell number on file:    Telephone Information:   Mobile [phone]       Best Time: ANY    Call taken on 9/22/2021 at 7:55 AM by Sammi Tony MA

## 2021-09-24 RX ORDER — PSEUDOEPHEDRINE HCL 30 MG
30 TABLET ORAL EVERY 4 HOURS PRN
Qty: 90 TABLET | Refills: 3 | Status: SHIPPED | OUTPATIENT
Start: 2021-09-24 | End: 2021-11-19

## 2021-10-04 ENCOUNTER — MYC MEDICAL ADVICE (OUTPATIENT)
Dept: FAMILY MEDICINE | Facility: CLINIC | Age: 46
End: 2021-10-04

## 2021-10-27 ENCOUNTER — HOSPITAL ENCOUNTER (OUTPATIENT)
Dept: MAMMOGRAPHY | Facility: CLINIC | Age: 46
Discharge: HOME | End: 2021-10-27
Attending: NURSE PRACTITIONER | Admitting: NURSE PRACTITIONER
Payer: COMMERCIAL

## 2021-10-27 DIAGNOSIS — Z12.31 VISIT FOR SCREENING MAMMOGRAM: ICD-10-CM

## 2021-10-27 PROCEDURE — 77063 BREAST TOMOSYNTHESIS BI: CPT

## 2021-11-17 ENCOUNTER — MYC MEDICAL ADVICE (OUTPATIENT)
Dept: FAMILY MEDICINE | Facility: CLINIC | Age: 46
End: 2021-11-17

## 2021-11-17 ENCOUNTER — OFFICE VISIT (OUTPATIENT)
Dept: FAMILY MEDICINE | Facility: CLINIC | Age: 46
End: 2021-11-17
Payer: COMMERCIAL

## 2021-11-17 ENCOUNTER — LAB (OUTPATIENT)
Dept: LAB | Facility: CLINIC | Age: 46
End: 2021-11-17

## 2021-11-17 VITALS
DIASTOLIC BLOOD PRESSURE: 62 MMHG | TEMPERATURE: 98.5 F | SYSTOLIC BLOOD PRESSURE: 110 MMHG | RESPIRATION RATE: 16 BRPM | WEIGHT: 156.5 LBS | HEIGHT: 67 IN | HEART RATE: 81 BPM | OXYGEN SATURATION: 96 % | BODY MASS INDEX: 24.56 KG/M2

## 2021-11-17 DIAGNOSIS — N92.0 EXCESSIVE AND FREQUENT MENSTRUATION: ICD-10-CM

## 2021-11-17 DIAGNOSIS — N89.8 VAGINAL DISCHARGE: ICD-10-CM

## 2021-11-17 DIAGNOSIS — G47.00 INSOMNIA, UNSPECIFIED TYPE: ICD-10-CM

## 2021-11-17 DIAGNOSIS — E55.9 VITAMIN D DEFICIENCY: ICD-10-CM

## 2021-11-17 DIAGNOSIS — T83.32XA INTRAUTERINE CONTRACEPTIVE DEVICE THREADS LOST, INITIAL ENCOUNTER: ICD-10-CM

## 2021-11-17 DIAGNOSIS — Z91.09 ENVIRONMENTAL ALLERGIES: ICD-10-CM

## 2021-11-17 DIAGNOSIS — Z13.1 SCREENING FOR DIABETES MELLITUS: ICD-10-CM

## 2021-11-17 DIAGNOSIS — Z12.4 SCREENING FOR MALIGNANT NEOPLASM OF CERVIX: ICD-10-CM

## 2021-11-17 DIAGNOSIS — F41.8 SITUATIONAL ANXIETY: ICD-10-CM

## 2021-11-17 DIAGNOSIS — Z13.220 LIPID SCREENING: ICD-10-CM

## 2021-11-17 DIAGNOSIS — Z00.00 ROUTINE GENERAL MEDICAL EXAMINATION AT A HEALTH CARE FACILITY: Primary | ICD-10-CM

## 2021-11-17 LAB
BASOPHILS # BLD AUTO: 0 10E3/UL (ref 0–0.2)
BASOPHILS NFR BLD AUTO: 1 %
CHOLEST SERPL-MCNC: 192 MG/DL
CLUE CELLS: NORMAL
EOSINOPHIL # BLD AUTO: 0.1 10E3/UL (ref 0–0.7)
EOSINOPHIL NFR BLD AUTO: 3 %
ERYTHROCYTE [DISTWIDTH] IN BLOOD BY AUTOMATED COUNT: 11.7 % (ref 10–15)
FASTING STATUS PATIENT QL REPORTED: YES
FASTING STATUS PATIENT QL REPORTED: YES
GLUCOSE BLD-MCNC: 95 MG/DL (ref 70–99)
HCT VFR BLD AUTO: 35.6 % (ref 35–47)
HDLC SERPL-MCNC: 60 MG/DL
HGB BLD-MCNC: 12.5 G/DL (ref 11.7–15.7)
LDLC SERPL CALC-MCNC: 120 MG/DL
LYMPHOCYTES # BLD AUTO: 1.4 10E3/UL (ref 0.8–5.3)
LYMPHOCYTES NFR BLD AUTO: 31 %
MCH RBC QN AUTO: 32.1 PG (ref 26.5–33)
MCHC RBC AUTO-ENTMCNC: 35.1 G/DL (ref 31.5–36.5)
MCV RBC AUTO: 91 FL (ref 78–100)
MONOCYTES # BLD AUTO: 0.4 10E3/UL (ref 0–1.3)
MONOCYTES NFR BLD AUTO: 8 %
NEUTROPHILS # BLD AUTO: 2.5 10E3/UL (ref 1.6–8.3)
NEUTROPHILS NFR BLD AUTO: 58 %
NONHDLC SERPL-MCNC: 132 MG/DL
PLATELET # BLD AUTO: 242 10E3/UL (ref 150–450)
RBC # BLD AUTO: 3.9 10E6/UL (ref 3.8–5.2)
TRICHOMONAS, WET PREP: NORMAL
TRIGL SERPL-MCNC: 61 MG/DL
TSH SERPL DL<=0.005 MIU/L-ACNC: 0.88 MU/L (ref 0.4–4)
WBC # BLD AUTO: 4.4 10E3/UL (ref 4–11)
WBC'S/HIGH POWER FIELD, WET PREP: NORMAL
YEAST, WET PREP: NORMAL

## 2021-11-17 PROCEDURE — 84443 ASSAY THYROID STIM HORMONE: CPT

## 2021-11-17 PROCEDURE — 80061 LIPID PANEL: CPT

## 2021-11-17 PROCEDURE — 87624 HPV HI-RISK TYP POOLED RSLT: CPT | Performed by: NURSE PRACTITIONER

## 2021-11-17 PROCEDURE — 99213 OFFICE O/P EST LOW 20 MIN: CPT | Mod: 25 | Performed by: NURSE PRACTITIONER

## 2021-11-17 PROCEDURE — 87210 SMEAR WET MOUNT SALINE/INK: CPT | Performed by: NURSE PRACTITIONER

## 2021-11-17 PROCEDURE — 99396 PREV VISIT EST AGE 40-64: CPT | Mod: 25 | Performed by: NURSE PRACTITIONER

## 2021-11-17 PROCEDURE — 85025 COMPLETE CBC W/AUTO DIFF WBC: CPT

## 2021-11-17 PROCEDURE — G0145 SCR C/V CYTO,THINLAYER,RESCR: HCPCS | Performed by: NURSE PRACTITIONER

## 2021-11-17 PROCEDURE — 36415 COLL VENOUS BLD VENIPUNCTURE: CPT

## 2021-11-17 PROCEDURE — 82306 VITAMIN D 25 HYDROXY: CPT

## 2021-11-17 PROCEDURE — 90471 IMMUNIZATION ADMIN: CPT | Performed by: NURSE PRACTITIONER

## 2021-11-17 PROCEDURE — 90715 TDAP VACCINE 7 YRS/> IM: CPT | Performed by: NURSE PRACTITIONER

## 2021-11-17 PROCEDURE — 82947 ASSAY GLUCOSE BLOOD QUANT: CPT

## 2021-11-17 RX ORDER — TRAZODONE HYDROCHLORIDE 50 MG/1
50-100 TABLET, FILM COATED ORAL AT BEDTIME
Qty: 0.1 TABLET | Refills: 0 | OUTPATIENT
Start: 2021-11-17

## 2021-11-17 RX ORDER — TRAZODONE HYDROCHLORIDE 50 MG/1
50-100 TABLET, FILM COATED ORAL AT BEDTIME
Qty: 180 TABLET | Refills: 0 | Status: CANCELLED | OUTPATIENT
Start: 2021-11-17

## 2021-11-17 ASSESSMENT — MIFFLIN-ST. JEOR: SCORE: 1372.51

## 2021-11-17 NOTE — TELEPHONE ENCOUNTER
Reason for Call:  Medication or medication refill:    Do you use a [facility]?  Name of the pharmacy and phone number for the current request:  [facility] - [phone]    Name of the medication requested: traZODone (DESYREL) 50 MG tablet    Other request: N/A    Can we leave a detailed message on this number? YES    Phone number patient can be reached at: Cell number on file:    Telephone Information:   Mobile [phone]       Best Time: ANY    Call taken on 11/17/2021 at 1:18 PM by Marva Naidu

## 2021-11-17 NOTE — TELEPHONE ENCOUNTER
"Requested Prescriptions   Pending Prescriptions Disp Refills     traZODone (DESYREL) 50 MG tablet 180 tablet 0     Sig: Take 1-2 tablets ( mg) by mouth At Bedtime       Serotonin Modulators Passed - 11/17/2021  1:18 PM        Passed - Recent (12 mo) or future (30 days) visit within the authorizing provider's specialty     Patient has had an office visit with the authorizing provider or a provider within the authorizing providers department within the previous 12 mos or has a future within next 30 days. See \"Patient Info\" tab in inbasket, or \"Choose Columns\" in Meds & Orders section of the refill encounter.              Passed - Medication is active on med list        Passed - Patient is age 18 or older        Passed - No active pregnancy on record        Passed - No positive pregnancy test in past 12 months           Advised pharmacy - 90 day supply sent 9/22/21 - may request refill when less than 30 days    Refused Prescriptions:                       Disp   Refills    traZODone (DESYREL) 50 MG tablet           0.1 ta*0        Sig: Take 1-2 tablets ( mg) by mouth At Bedtime  Refused By: CRYSTAL CAMACHO  Reason for Refusal: Patient has requested refill too soon      "

## 2021-11-17 NOTE — PROGRESS NOTES
SUBJECTIVE:   CC: Aditi Osuna is an 46 year old woman who presents for preventive health visit.     Patient has been advised of split billing requirements and indicates understanding: Yes  HPI        After her COVID booster had spotting then after second she had a heavy, 3 week long period, no other symptoms has IUD  Cannot feel IUD strings but never could, used to see ObGyn for yearly gyn exams  Work stress and sometimes depressing, life is good overall very healthy good lifestyle  5 yr old great, dating casually female partners    Today's PHQ-2 Score:   PHQ-2 ( 1999 Pf[employer]) 3/6/2021   Q1: Little interest or pleasure in doing things 0   Q2: Feeling down, depressed or hopeless 0   PHQ-2 Score 0   Q1: Little interest or pleasure in doing things -   Q2: Feeling down, depressed or hopeless -   PHQ-2 Score -       Abuse: Current or Past (Physical, Sexual or Emotional) - No  Do you feel safe in your environment? YES    Have you ever done Advance Care Planning? (For example, a Health Directive, POLST, or a discussion with a medical provider or your loved ones about your wishes): Yes, advance care planning is on file.    Social History     Tobacco Use     Smoking status: Never Smoker     Smokeless tobacco: Never Used   Substance Use Topics     Alcohol use: Yes     If you drink alcohol do you typically have >3 drinks per day or >7 drinks per week? Not applicable    Alcohol Use 11/10/2020   Prescreen: >3 drinks/day or >7 drinks/week? No       Reviewed orders with patient.  Reviewed health maintenance and updated orders accordingly - Yes  Lab work is in process  Labs reviewed in EPIC  BP Readings from Last 3 Encounters:   11/17/21 110/62   11/11/20 118/72   12/10/19 119/72    Wt Readings from Last 3 Encounters:   11/17/21 71 kg (156 lb 8 oz)   03/06/21 73.4 kg (161 lb 12.8 oz)   11/11/20 75.1 kg (165 lb 8 oz)                  Patient Active Problem List   Diagnosis     IUD (intrauterine device) in place     Insomnia,  unspecified type     Chronic pain of both knees     Patellofemoral pain syndrome of both knees     Mixed stress and urge urinary incontinence     No past surgical history on file.    Social History     Tobacco Use     Smoking status: Never Smoker     Smokeless tobacco: Never Used   Substance Use Topics     Alcohol use: Yes     Family History   Problem Relation Age of Onset     Other Cancer Maternal Grandfather      Heart Disease Paternal Grandfather      Osteoporosis Mother      Hypertension Father      Diabetes Father          Current Outpatient Medications   Medication Sig Dispense Refill     azelastine (ASTELIN) 0.1 % nasal spray Spray 1 spray into both nostrils At Bedtime 30 mL 11     azelastine (ASTELIN) 0.1 % nasal spray Spray 1 spray into both nostrils 2 times daily       cetirizine HCl 10 MG CAPS        fluticasone (FLONASE) 50 MCG/ACT nasal spray Spray 1 spray into both nostrils daily 16 mL 3     fluticasone (FLONASE) 50 MCG/ACT nasal spray Spray 2 sprays into both nostrils daily       glucosamine-chondroitin 500-400 MG CAPS Take 1 capsule by mouth daily       hydrOXYzine (VISTARIL) 25 MG capsule Take 1 capsule (25 mg) by mouth 3 times daily as needed for anxiety 120 capsule 1     hydrOXYzine (VISTARIL) 50 MG capsule Take 1-2 capsules ( mg) by mouth nightly as needed for other (sleep) 180 capsule 3     multivitamin, therapeutic with minerals (MULTI-VITAMIN) TABS Take 1 tablet by mouth daily       Omega-3 Fatty Acids (OMEGA-3 FISH OIL PO) Take 1 g by mouth       pseudoePHEDrine (SUDAFED) 30 MG tablet Take 1 tablet (30 mg) by mouth every 4 hours as needed for congestion 90 tablet 3     pseudoePHEDrine (SUDAFED) 60 MG tablet Take 60 mg by mouth every 4 hours as needed for congestion       sertraline (ZOLOFT) 100 MG tablet Take 1 tablet (100 mg) by mouth daily       traZODone (DESYREL) 50 MG tablet Take 1-2 tablets ( mg) by mouth At Bedtime 180 tablet 0     traZODone (DESYREL) 50 MG tablet Take 1-2  "tablets ( mg) by mouth At Bedtime 180 tablet 0     VITAMIN D, CHOLECALCIFEROL, PO Take 2,000 Units by mouth daily       Allergies   Allergen Reactions     Augmentin Nausea and Vomiting     Sulfa Drugs Rash       Breast Cancer Screening:  Any new diagnosis of family breast, ovarian, or bowel cancer? No    FHS-7:   Breast CA Risk Assessment (FHS-7) 10/27/2021   Did any of your first-degree relatives have breast or ovarian cancer? No   Did any of your relatives have bilateral breast cancer? No   Did any man in your family have breast cancer? No   Did any woman in your family have breast and ovarian cancer? No   Did any woman in your family have breast cancer before age 50 y? No   Do you have 2 or more relatives with breast and/or ovarian cancer? No   Do you have 2 or more relatives with breast and/or bowel cancer? No       Mammogram Screening: Recommended annual mammography  Pertinent mammograms are reviewed under the imaging tab.    History of abnormal Pap smear: YES - other categories - see link Cervical Cytology Screening Guidelines in the past in her 20s  Last 3 Pap and HPV Results:       Reviewed and updated as needed this visit by clinical staff   Allergies              Reviewed and updated as needed this visit by Provider               No past medical history on file.   No past surgical history on file.  OB History   No obstetric history on file.       Review of Systems  Constitutional, eye, ENT, skin, breast, respiratory, cardiac, GI, , MSK, neuro, psych, and allergy are normal except as otherwise noted.        OBJECTIVE:   /62   Pulse 81   Temp 98.5  F (36.9  C)   Resp 16   Ht 1.702 m (5' 7\")   Wt 71 kg (156 lb 8 oz)   SpO2 96%   BMI 24.51 kg/m    Physical Exam  GENERAL: healthy, alert and no distress  EYES: Eyes grossly normal to inspection, PERRL and conjunctivae and sclerae normal  HENT: ear canals and TM's normal, nose and mouth without ulcers or lesions  NECK: no adenopathy, no " asymmetry, masses, or scars and thyroid normal to palpation  RESP: lungs clear to auscultation - no rales, rhonchi or wheezes  BREAST: normal without masses, tenderness or nipple discharge and no palpable axillary masses or adenopathy  CV: regular rate and rhythm, normal S1 S2, no S3 or S4, no murmur, click or rub, no peripheral edema and peripheral pulses strong  ABDOMEN: soft, nontender, no hepatosplenomegaly, no masses and bowel sounds normal   (female): normal female external genitalia, normal urethral meatus, vaginal mucosa pink, moist, well rugated, and normal cervix/adnexa/uterus without masses or discharge  MS: no gross musculoskeletal defects noted, no edema  SKIN: no suspicious lesions or rashes  NEURO: Normal strength and tone, mentation intact and speech normal  PSYCH: mentation appears normal, affect normal/bright    Diagnostic Test Results:  Labs reviewed in Epic  Results for orders placed or performed in visit on 11/17/21 (from the past 24 hour(s))   Wet prep - Clinic Collect    Specimen: Vagina; Swab   Result Value Ref Range    Trichomonas Absent Absent    Yeast Absent Absent    Clue Cells Absent Absent    WBCs/high power field None None       ASSESSMENT/PLAN:       ICD-10-CM    1. Routine general medical examination at a health care facility  Z00.00    2. Lipid screening  Z13.220 Lipid panel reflex to direct LDL Fasting   3. Screening for diabetes mellitus  Z13.1 Glucose   4. Screening for malignant neoplasm of cervix  Z12.4 Pap screen with HPV - recommended age 30 - 65 years   5. Excessive and frequent menstruation  N92.0 TSH with free T4 reflex     CBC with platelets and differential   6. Vaginal discharge  N89.8 Wet prep - Clinic Collect   7. Intrauterine contraceptive device threads lost, initial encounter  T83.32XA US Pelvic Complete with Transvaginal       Patient has been advised of split billing requirements and indicates understanding:   COUNSELING:  Reviewed preventive health counseling,  "as reflected in patient instructions    Estimated body mass index is 24.81 kg/m  as calculated from the following:    Height as of 3/6/21: 1.72 m (5' 7.72\").    Weight as of 3/6/21: 73.4 kg (161 lb 12.8 oz).        She reports that she has never smoked. She has never used smokeless tobacco.      Counseling Resources:  ATP IV Guidelines  Pooled Cohorts Equation Calculator  Breast Cancer Risk Calculator  BRCA-Related Cancer Risk Assessment: FHS-7 Tool  FRAX Risk Assessment  ICSI Preventive Guidelines  Dietary Guidelines for Americans, 2010  USDA's MyPlate  ASA Prophylaxis  Lung CA Screening    Philly Gamez, NITIN FORD  [facility]    "

## 2021-11-19 LAB
BKR LAB AP GYN ADEQUACY: NORMAL
BKR LAB AP GYN INTERPRETATION: NORMAL
BKR LAB AP HPV REFLEX: NORMAL
BKR LAB AP PREVIOUS ABNORMAL: NORMAL
DEPRECATED CALCIDIOL+CALCIFEROL SERPL-MC: 94 UG/L (ref 20–75)
PATH REPORT.COMMENTS IMP SPEC: NORMAL
PATH REPORT.COMMENTS IMP SPEC: NORMAL
PATH REPORT.RELEVANT HX SPEC: NORMAL

## 2021-11-19 RX ORDER — AZELASTINE 1 MG/ML
1 SPRAY, METERED NASAL AT BEDTIME
Qty: 90 ML | Refills: 11 | Status: SHIPPED | OUTPATIENT
Start: 2021-11-19 | End: 2022-11-29

## 2021-11-19 RX ORDER — SERTRALINE HYDROCHLORIDE 100 MG/1
100 TABLET, FILM COATED ORAL DAILY
Qty: 90 TABLET | Refills: 1 | Status: SHIPPED | OUTPATIENT
Start: 2021-11-19 | End: 2022-10-28

## 2021-11-19 RX ORDER — PSEUDOEPHEDRINE HCL 30 MG
30 TABLET ORAL EVERY 4 HOURS PRN
Qty: 90 TABLET | Refills: 3 | Status: SHIPPED | OUTPATIENT
Start: 2021-11-19 | End: 2022-12-25

## 2021-11-19 RX ORDER — HYDROXYZINE PAMOATE 50 MG/1
50-100 CAPSULE ORAL
Qty: 180 CAPSULE | Refills: 3 | Status: SHIPPED | OUTPATIENT
Start: 2021-11-19 | End: 2021-11-19

## 2021-11-19 RX ORDER — HYDROXYZINE PAMOATE 50 MG/1
50-100 CAPSULE ORAL
Qty: 180 CAPSULE | Refills: 3 | Status: SHIPPED | OUTPATIENT
Start: 2021-11-19 | End: 2022-12-21

## 2021-11-19 RX ORDER — FLUTICASONE PROPIONATE 50 MCG
2 SPRAY, SUSPENSION (ML) NASAL DAILY
Qty: 18 ML | Refills: 3 | Status: SHIPPED | OUTPATIENT
Start: 2021-11-19 | End: 2021-11-19

## 2021-11-19 RX ORDER — AZELASTINE 1 MG/ML
1 SPRAY, METERED NASAL AT BEDTIME
Qty: 90 ML | Refills: 11 | Status: SHIPPED | OUTPATIENT
Start: 2021-11-19 | End: 2021-11-19

## 2021-11-19 RX ORDER — FLUTICASONE PROPIONATE 50 MCG
2 SPRAY, SUSPENSION (ML) NASAL DAILY
Qty: 18 ML | Refills: 3 | Status: SHIPPED | OUTPATIENT
Start: 2021-11-19 | End: 2022-05-17

## 2021-11-19 RX ORDER — TRAZODONE HYDROCHLORIDE 50 MG/1
50-100 TABLET, FILM COATED ORAL AT BEDTIME
Qty: 180 TABLET | Refills: 0 | Status: SHIPPED | OUTPATIENT
Start: 2021-11-19 | End: 2022-03-28

## 2021-11-19 RX ORDER — SERTRALINE HYDROCHLORIDE 100 MG/1
100 TABLET, FILM COATED ORAL DAILY
Qty: 90 TABLET | Refills: 1 | Status: SHIPPED | OUTPATIENT
Start: 2021-11-19 | End: 2021-11-19

## 2021-11-19 NOTE — TELEPHONE ENCOUNTER
[place],    Please see [facility].   meds cued.     Thanks,  SIERRA Ness  [facility]

## 2021-11-23 LAB
HUMAN PAPILLOMA VIRUS 16 DNA: NEGATIVE
HUMAN PAPILLOMA VIRUS 18 DNA: NEGATIVE
HUMAN PAPILLOMA VIRUS FINAL DIAGNOSIS: NORMAL
HUMAN PAPILLOMA VIRUS OTHER HR: NEGATIVE

## 2021-12-01 ENCOUNTER — ANCILLARY PROCEDURE (OUTPATIENT)
Dept: ULTRASOUND IMAGING | Facility: CLINIC | Age: 46
End: 2021-12-01
Attending: NURSE PRACTITIONER
Payer: COMMERCIAL

## 2021-12-01 DIAGNOSIS — T83.32XA INTRAUTERINE CONTRACEPTIVE DEVICE THREADS LOST, INITIAL ENCOUNTER: ICD-10-CM

## 2021-12-01 PROCEDURE — 76830 TRANSVAGINAL US NON-OB: CPT | Performed by: OBSTETRICS & GYNECOLOGY

## 2021-12-01 PROCEDURE — 76856 US EXAM PELVIC COMPLETE: CPT | Performed by: OBSTETRICS & GYNECOLOGY

## 2021-12-07 PROBLEM — M22.2X1 PATELLOFEMORAL PAIN SYNDROME OF BOTH KNEES: Status: RESOLVED | Noted: 2021-04-23 | Resolved: 2021-12-07

## 2021-12-07 PROBLEM — G89.29 CHRONIC PAIN OF BOTH KNEES: Status: RESOLVED | Noted: 2021-04-23 | Resolved: 2021-12-07

## 2021-12-07 PROBLEM — M25.562 CHRONIC PAIN OF BOTH KNEES: Status: RESOLVED | Noted: 2021-04-23 | Resolved: 2021-12-07

## 2021-12-07 PROBLEM — M22.2X2 PATELLOFEMORAL PAIN SYNDROME OF BOTH KNEES: Status: RESOLVED | Noted: 2021-04-23 | Resolved: 2021-12-07

## 2021-12-07 PROBLEM — M25.561 CHRONIC PAIN OF BOTH KNEES: Status: RESOLVED | Noted: 2021-04-23 | Resolved: 2021-12-07

## 2021-12-07 NOTE — PROGRESS NOTES
Discharge Note    Progress reporting period is from initial eval to Aug 12, 2021.     Aditi failed to return for next follow up visit and current status is unknown.  Please see information below for last relevant information on current status.  Patient seen for 1 visits.  SUBJECTIVE  Subjective changes noted by patient:  See eval  .  Current pain level is  .     Previous pain level was  0/10.   Changes in function:  Yes (See Goal flowsheet attached for changes in current functional level)  Adverse reaction to treatment or activity: None    OBJECTIVE  Changes noted in objective findings: See eval     ASSESSMENT/PLAN  Diagnosis: UI   DIAGP:  The encounter diagnosis was Mixed stress and urge urinary incontinence.  Updated problem list and treatment plan:   Decreased function - HEP  STG/LTGs have been met or progress has been made towards goals:  Yes, please see goal flowsheet for most current information  Assessment of Progress: current status is unknown.    Last current status:     Self Management Plans:  HEP  I have re-evaluated this patient and find that the nature, scope, duration and intensity of the therapy is appropriate for the medical condition of the patient.  Aditi continues to require the following intervention to meet STG and LTG's:  HEP.    Recommendations:  Discharge with current home program.  Patient to follow up with MD as needed.    Please refer to the daily flowsheet for treatment today, total treatment time and time spent performing 1:1 timed codes.     Detail Level: Zone Note Text (......Xxx Chief Complaint.): This diagnosis correlates with the Other (Free Text): Pt will call, go to the Emergency room or return if the rash worsens. Permethrin/Ivermectin in reserve due to history of close contact having a pruritic rash and bites after sleeping on an old sofa.

## 2021-12-24 ENCOUNTER — MYC MEDICAL ADVICE (OUTPATIENT)
Dept: FAMILY MEDICINE | Facility: CLINIC | Age: 46
End: 2021-12-24
Payer: COMMERCIAL

## 2021-12-24 DIAGNOSIS — Z91.09 ENVIRONMENTAL ALLERGIES: ICD-10-CM

## 2022-02-28 ENCOUNTER — APPOINTMENT (OUTPATIENT)
Dept: GENERAL RADIOLOGY | Facility: CLINIC | Age: 47
End: 2022-02-28
Attending: EMERGENCY MEDICINE
Payer: COMMERCIAL

## 2022-02-28 ENCOUNTER — HOSPITAL ENCOUNTER (EMERGENCY)
Facility: CLINIC | Age: 47
Discharge: HOME | End: 2022-02-28
Attending: EMERGENCY MEDICINE | Admitting: EMERGENCY MEDICINE
Payer: COMMERCIAL

## 2022-02-28 VITALS
HEART RATE: 60 BPM | OXYGEN SATURATION: 100 % | DIASTOLIC BLOOD PRESSURE: 61 MMHG | WEIGHT: 161 LBS | TEMPERATURE: 98.1 F | SYSTOLIC BLOOD PRESSURE: 120 MMHG | HEIGHT: 67 IN | RESPIRATION RATE: 15 BRPM | BODY MASS INDEX: 25.27 KG/M2

## 2022-02-28 DIAGNOSIS — R07.9 CHEST PAIN, UNSPECIFIED TYPE: ICD-10-CM

## 2022-02-28 LAB
ALBUMIN SERPL-MCNC: 4.2 G/DL (ref 3.4–5)
ALP SERPL-CCNC: 54 U/L (ref 40–150)
ALT SERPL W P-5'-P-CCNC: 25 U/L (ref 0–50)
ANION GAP SERPL CALCULATED.3IONS-SCNC: 8 MMOL/L (ref 3–14)
AST SERPL W P-5'-P-CCNC: 20 U/L (ref 0–45)
ATRIAL RATE - MUSE: 84 BPM
BASOPHILS # BLD AUTO: 0 10E3/UL (ref 0–0.2)
BASOPHILS NFR BLD AUTO: 1 %
BILIRUB SERPL-MCNC: 0.5 MG/DL (ref 0.2–1.3)
BUN SERPL-MCNC: 10 MG/DL (ref 7–30)
CALCIUM SERPL-MCNC: 9.6 MG/DL (ref 8.5–10.1)
CHLORIDE BLD-SCNC: 110 MMOL/L (ref 94–109)
CO2 SERPL-SCNC: 21 MMOL/L (ref 20–32)
CREAT SERPL-MCNC: 0.77 MG/DL (ref 0.52–1.04)
DIASTOLIC BLOOD PRESSURE - MUSE: NORMAL MMHG
EOSINOPHIL # BLD AUTO: 0.2 10E3/UL (ref 0–0.7)
EOSINOPHIL NFR BLD AUTO: 3 %
ERYTHROCYTE [DISTWIDTH] IN BLOOD BY AUTOMATED COUNT: 11.6 % (ref 10–15)
GFR SERPL CREATININE-BSD FRML MDRD: >90 ML/MIN/1.73M2
GLUCOSE BLD-MCNC: 101 MG/DL (ref 70–99)
HCT VFR BLD AUTO: 36.3 % (ref 35–47)
HGB BLD-MCNC: 12.8 G/DL (ref 11.7–15.7)
HOLD SPECIMEN: NORMAL
IMM GRANULOCYTES # BLD: 0 10E3/UL
IMM GRANULOCYTES NFR BLD: 0 %
INTERPRETATION ECG - MUSE: NORMAL
LYMPHOCYTES # BLD AUTO: 1.6 10E3/UL (ref 0.8–5.3)
LYMPHOCYTES NFR BLD AUTO: 34 %
MCH RBC QN AUTO: 31.3 PG (ref 26.5–33)
MCHC RBC AUTO-ENTMCNC: 35.3 G/DL (ref 31.5–36.5)
MCV RBC AUTO: 89 FL (ref 78–100)
MONOCYTES # BLD AUTO: 0.4 10E3/UL (ref 0–1.3)
MONOCYTES NFR BLD AUTO: 8 %
NEUTROPHILS # BLD AUTO: 2.5 10E3/UL (ref 1.6–8.3)
NEUTROPHILS NFR BLD AUTO: 54 %
NRBC # BLD AUTO: 0 10E3/UL
NRBC BLD AUTO-RTO: 0 /100
P AXIS - MUSE: 68 DEGREES
PLATELET # BLD AUTO: 256 10E3/UL (ref 150–450)
POTASSIUM BLD-SCNC: 4.1 MMOL/L (ref 3.4–5.3)
PR INTERVAL - MUSE: 136 MS
PROT SERPL-MCNC: 6.8 G/DL (ref 6.8–8.8)
QRS DURATION - MUSE: 78 MS
QT - MUSE: 354 MS
QTC - MUSE: 418 MS
R AXIS - MUSE: 87 DEGREES
RBC # BLD AUTO: 4.09 10E6/UL (ref 3.8–5.2)
SODIUM SERPL-SCNC: 139 MMOL/L (ref 133–144)
SYSTOLIC BLOOD PRESSURE - MUSE: NORMAL MMHG
T AXIS - MUSE: 55 DEGREES
TROPONIN I SERPL HS-MCNC: <3 NG/L
VENTRICULAR RATE- MUSE: 84 BPM
WBC # BLD AUTO: 4.7 10E3/UL (ref 4–11)

## 2022-02-28 PROCEDURE — 85025 COMPLETE CBC W/AUTO DIFF WBC: CPT | Performed by: EMERGENCY MEDICINE

## 2022-02-28 PROCEDURE — 80053 COMPREHEN METABOLIC PANEL: CPT | Performed by: EMERGENCY MEDICINE

## 2022-02-28 PROCEDURE — 99285 EMERGENCY DEPT VISIT HI MDM: CPT | Mod: 25 | Performed by: EMERGENCY MEDICINE

## 2022-02-28 PROCEDURE — 93010 ELECTROCARDIOGRAM REPORT: CPT | Performed by: EMERGENCY MEDICINE

## 2022-02-28 PROCEDURE — 93005 ELECTROCARDIOGRAM TRACING: CPT | Performed by: EMERGENCY MEDICINE

## 2022-02-28 PROCEDURE — 71046 X-RAY EXAM CHEST 2 VIEWS: CPT

## 2022-02-28 PROCEDURE — 84484 ASSAY OF TROPONIN QUANT: CPT | Performed by: EMERGENCY MEDICINE

## 2022-02-28 PROCEDURE — 250N000013 HC RX MED GY IP 250 OP 250 PS 637: Performed by: EMERGENCY MEDICINE

## 2022-02-28 PROCEDURE — 36415 COLL VENOUS BLD VENIPUNCTURE: CPT | Performed by: EMERGENCY MEDICINE

## 2022-02-28 RX ORDER — LIDOCAINE 50 MG/G
1 PATCH TOPICAL EVERY 24 HOURS
Qty: 7 PATCH | Refills: 0 | Status: SHIPPED | OUTPATIENT
Start: 2022-02-28 | End: 2022-03-07

## 2022-02-28 RX ORDER — LIDOCAINE 4 G/G
1 PATCH TOPICAL ONCE
Status: DISCONTINUED | OUTPATIENT
Start: 2022-02-28 | End: 2022-02-28 | Stop reason: HOSPADM

## 2022-02-28 RX ADMIN — LIDOCAINE PATCH 4% 1 PATCH: 40 PATCH TOPICAL at 10:08

## 2022-02-28 ASSESSMENT — ENCOUNTER SYMPTOMS
EYE REDNESS: 0
SORE THROAT: 0
PHOTOPHOBIA: 0
BACK PAIN: 0
DIFFICULTY URINATING: 0
NAUSEA: 0
RHINORRHEA: 0
DYSURIA: 0
CHILLS: 0
SHORTNESS OF BREATH: 0
VOMITING: 0
FEVER: 0
CONSTIPATION: 0
COUGH: 0
SEIZURES: 0
ABDOMINAL PAIN: 0
DIARRHEA: 0
CONFUSION: 0
HEADACHES: 0

## 2022-02-28 NOTE — ED TRIAGE NOTES
Patient arrived to ED from home with complaint of chest pain that started Friday. Rates the pain at a 6/10. Radiates to right shoulder. Described as aching.

## 2022-02-28 NOTE — ED PROVIDER NOTES
ED Provider Note  [facility]      History     Chief Complaint   Patient presents with     Chest Pain     started friday     Shoulder Pain     right     HPI  Aditi Osuna is a 46 year old female with a history of anxiety who presents for chest pain. The patient reports she has been having right sided chest, shoulder, neck and head pain starting on Friday 3 days ago. The chest pain is right of her sternum, and runs under her right breast. This pain is constant, not pleuritic, non positional and non exertional. She rates the pain as 6/10 in severity. She has taken Advil and Hydroxyzine at home with no improvement in her pain. She states she has not had any similar pains previously.  She denies any rash.  No associated shortness of breath.  She reports she has been stressed and very busy recently, but no recent significant life changes, and she doesn't think this pain is directly related to anxiety. She otherwise denies any shortness of breath, no trauma, no fevers, chills, recent illness. No history of blood clots, no recent immobilization, surgeries or travel. No exposures to Covid and she is vaccinated. She has the Mirena IUD.    Past Medical History  Past Medical History:   Diagnosis Date     Anxiety      Past Surgical History:   Procedure Laterality Date     BREAST SURGERY       azelastine (ASTELIN) 0.1 % nasal spray  azelastine (ASTELIN) 0.1 % nasal spray  cetirizine HCl 10 MG CAPS  fluticasone (FLONASE) 50 MCG/ACT nasal spray  fluticasone (FLONASE) 50 MCG/ACT nasal spray  glucosamine-chondroitin 500-400 MG CAPS  hydrOXYzine (VISTARIL) 50 MG capsule  lidocaine (LIDODERM) 5 % patch  pseudoePHEDrine (SUDAFED) 30 MG tablet  sertraline (ZOLOFT) 100 MG tablet  traZODone (DESYREL) 50 MG tablet  multivitamin, therapeutic with minerals (MULTI-VITAMIN) TABS  Omega-3 Fatty Acids (OMEGA-3 FISH OIL PO)  VITAMIN D, CHOLECALCIFEROL, PO      Allergies   Allergen Reactions     Augmentin Nausea and  "Vomiting     Sulfa Drugs Rash     Family History  Family History   Problem Relation Age of Onset     Other Cancer Maternal Grandfather      Heart Disease Paternal Grandfather      Osteoporosis Mother      Hypertension Father      Diabetes Father      Social History   Social History     Tobacco Use     Smoking status: Never Smoker     Smokeless tobacco: Never Used   Substance Use Topics     Alcohol use: Yes     Drug use: No      Past medical history, past surgical history, medications, allergies, family history, and social history were reviewed with the patient. No additional pertinent items.       Review of Systems   Constitutional: Negative for chills and fever.   HENT: Negative for congestion, rhinorrhea and sore throat.    Eyes: Negative for photophobia and redness.   Respiratory: Negative for cough and shortness of breath.    Cardiovascular: Positive for chest pain.   Gastrointestinal: Negative for abdominal pain, constipation, diarrhea, nausea and vomiting.   Genitourinary: Negative for difficulty urinating and dysuria.   Musculoskeletal: Negative for back pain.   Skin: Negative for rash.   Neurological: Negative for seizures and headaches.   Psychiatric/Behavioral: Negative for confusion.     A complete review of systems was performed with pertinent positives and negatives noted in the HPI, and all other systems negative.    Physical Exam   BP: 129/71  Pulse: 86  Temp: 98.1  F (36.7  C)  Resp: 18  Height: 170.2 cm (5' 7\")  Weight: 73 kg (161 lb)  SpO2: 98 %  Physical Exam  Constitutional:       General: She is awake. She is not in acute distress.     Appearance: Normal appearance. She is well-developed. She is not ill-appearing or toxic-appearing.   HENT:      Head: Normocephalic and atraumatic.      Mouth/Throat:      Pharynx: No oropharyngeal exudate.   Eyes:      General: No scleral icterus.     Pupils: Pupils are equal, round, and reactive to light.   Cardiovascular:      Rate and Rhythm: Normal rate and " regular rhythm.      Pulses:           Radial pulses are 2+ on the right side.        Posterior tibial pulses are 2+ on the right side.      Heart sounds: Normal heart sounds, S1 normal and S2 normal.   Pulmonary:      Effort: Pulmonary effort is normal. No respiratory distress.      Breath sounds: Normal breath sounds.   Chest:      Chest wall: Tenderness present.   Abdominal:      General: Bowel sounds are normal.      Palpations: Abdomen is soft.      Tenderness: There is no abdominal tenderness.   Musculoskeletal:         General: No tenderness.   Skin:     General: Skin is warm and dry.      Findings: No rash.   Neurological:      Mental Status: She is alert and oriented to person, place, and time.   Psychiatric:         Attention and Perception: Attention normal.         Mood and Affect: Mood normal.         Speech: Speech normal.         Behavior: Behavior normal. Behavior is cooperative.         ED Course      Procedures            EKG Interpretation:      Interpreted by Ehsan Gooden MD PhD  Time reviewed: 0900  Symptoms at time of EKG: chest pain   Rhythm: normal sinus   Rate: normal  Axis: normal  Ectopy: none  Conduction: normal  ST Segments/ T Waves: No ST-T wave changes  Q Waves: none  Comparison to prior: none    Clinical Impression: normal EKG     Results for orders placed or performed during the hospital encounter of 02/28/22   XR Chest 2 Views     Status: None    Narrative    CHEST TWO VIEWS  2/28/2022 9:39 AM     HISTORY:  Chest pain.    COMPARISON: None.      Impression    IMPRESSION: Negative chest. Lungs clear.    MANUEL DILL MD         SYSTEM ID:  [id number]   Comprehensive metabolic panel     Status: Abnormal   Result Value Ref Range    Sodium 139 133 - 144 mmol/L    Potassium 4.1 3.4 - 5.3 mmol/L    Chloride 110 (H) 94 - 109 mmol/L    Carbon Dioxide (CO2) 21 20 - 32 mmol/L    Anion Gap 8 3 - 14 mmol/L    Urea Nitrogen 10 7 - 30 mg/dL    Creatinine 0.77 0.52 - 1.04 mg/dL    Calcium 9.6 8.5 -  10.1 mg/dL    Glucose 101 (H) 70 - 99 mg/dL    Alkaline Phosphatase 54 40 - 150 U/L    AST 20 0 - 45 U/L    ALT 25 0 - 50 U/L    Protein Total 6.8 6.8 - 8.8 g/dL    Albumin 4.2 3.4 - 5.0 g/dL    Bilirubin Total 0.5 0.2 - 1.3 mg/dL    GFR Estimate >90 >60 mL/min/1.73m2   Troponin I     Status: Normal   Result Value Ref Range    Troponin I High Sensitivity <3 <54 ng/L   CBC with platelets and differential     Status: None   Result Value Ref Range    WBC Count 4.7 4.0 - 11.0 10e3/uL    RBC Count 4.09 3.80 - 5.20 10e6/uL    Hemoglobin 12.8 11.7 - 15.7 g/dL    Hematocrit 36.3 35.0 - 47.0 %    MCV 89 78 - 100 fL    MCH 31.3 26.5 - 33.0 pg    MCHC 35.3 31.5 - 36.5 g/dL    RDW 11.6 10.0 - 15.0 %    Platelet Count 256 150 - 450 10e3/uL    % Neutrophils 54 %    % Lymphocytes 34 %    % Monocytes 8 %    % Eosinophils 3 %    % Basophils 1 %    % Immature Granulocytes 0 %    NRBCs per 100 WBC 0 <1 /100    Absolute Neutrophils 2.5 1.6 - 8.3 10e3/uL    Absolute Lymphocytes 1.6 0.8 - 5.3 10e3/uL    Absolute Monocytes 0.4 0.0 - 1.3 10e3/uL    Absolute Eosinophils 0.2 0.0 - 0.7 10e3/uL    Absolute Basophils 0.0 0.0 - 0.2 10e3/uL    Absolute Immature Granulocytes 0.0 <=0.4 10e3/uL    Absolute NRBCs 0.0 10e3/uL   EKG 12 lead     Status: None (Preliminary result)   Result Value Ref Range    Systolic Blood Pressure  mmHg    Diastolic Blood Pressure  mmHg    Ventricular Rate 84 BPM    Atrial Rate 84 BPM    IL Interval 136 ms    QRS Duration 78 ms     ms    QTc 418 ms    P Axis 68 degrees    R AXIS 87 degrees    T Axis 55 degrees    Interpretation ECG Sinus rhythm  Normal ECG      CBC with platelets differential     Status: None    Narrative    The following orders were created for panel order CBC with platelets differential.  Procedure                               Abnormality         Status                     ---------                               -----------         ------                     CBC with platelets and  d...[[id number]]                      Final result                 Please view results for these tests on the individual orders.     Medications   Lidocaine (LIDOCARE) 4 % Patch 1 patch (1 patch Transdermal Patch/Med Applied 2/28/22 1008)        Assessments & Plan (with Medical Decision Making)   Aditi Osuna is a 46 y.o. female with a history of anxiety who presents to the ED for chest pain.    Ddx includes ACS, dysrhythmia, PE, pneumothorax, pneumonia, aortic dissection, musculoskeletal pain and others.    The patient's history reveals several days of nonexertional, non positional chest pain which is worsened with palpation. EKG and troponin were normal. She has no other shortness of breath, no history of blood clot or risk factors for DVT. She can be ruled out with PERC so no indication for further workup for PE at this point. Basic lab workup with CBC and CMP were unremarkable. CXR was obtained which was normal. Her chest pain was worsened with palpation, and so she was provided with topical lidocaine patches which she states helped the pain. Patient was reassured with negative workup, and was in stable condition for discharge at this point with a prescription for lidocaine patches for pain as needed. Discussed indications and symptoms for return to the ED.    I have reviewed the nursing notes. I have reviewed the findings, diagnosis, plan and need for follow up with the patient.    Discharge Medication List as of 2/28/2022 11:04 AM      START taking these medications    Details   lidocaine (LIDODERM) 5 % patch Place 1 patch onto the skin every 24 hours for 7 days To prevent lidocaine toxicity, patient should be patch free for 12 hrs daily.Disp-7 patch, R-0Local Print             Final diagnoses:   Chest pain, unspecified type       Wesley Ortiz, MS4  --  Fabiana Lyons MD PhD  [facility] EMERGENCY DEPARTMENT  2/28/2022    --    ED Attending Physician Attestation    I FABIANA LYONS MD, cared for  this patient with the Medical Student. I performed, or re-performed, the physical exam and medical decision-making. I have verified the accuracy of all the medical student findings and documentation above, and have edited as necessary.    Summary of HPI, PE, ED Course   Patient is a 46 year old female evaluated in the emergency department for right-sided focal chest pain that is reproducible with palpation. Exam notable for focal area of tenderness to palpation. ED course notable for negative cardiac work-up, PERC negative, improved symptoms with Lidoderm patch. After the completion of care in the emergency department, the patient was discharged.    Critical Care & Procedures  Not applicable.    Medical Decision Making  The medical record was reviewed and interpreted.      FABIANA GOODEN MD  Emergency Medicine          Fabiana Gooden MD  02/28/22 2031

## 2022-03-28 DIAGNOSIS — G47.00 INSOMNIA, UNSPECIFIED TYPE: ICD-10-CM

## 2022-03-28 RX ORDER — TRAZODONE HYDROCHLORIDE 50 MG/1
50-100 TABLET, FILM COATED ORAL AT BEDTIME
Qty: 180 TABLET | Refills: 0 | Status: SHIPPED | OUTPATIENT
Start: 2022-03-28 | End: 2022-07-11

## 2022-03-28 NOTE — TELEPHONE ENCOUNTER
Reason for Call:  Medication or medication refill:    Do you use a [facility]?  Name of the pharmacy and phone number for the current request:  [facility] [place], [place] - [address]    Name of the medication requested: traZODone (DESYREL) 50 MG tablet    Other request: N/A    Can we leave a detailed message on this number? YES    Phone number patient can be reached at: Home number on file [phone] (home)    Best Time: ANY    Call taken on 3/28/2022 at 9:55 AM by Ny Porter

## 2022-03-28 NOTE — TELEPHONE ENCOUNTER
"Requested Prescriptions   Signed Prescriptions Disp Refills    traZODone (DESYREL) 50 MG tablet 180 tablet 0     Sig: Take 1-2 tablets ( mg) by mouth At Bedtime       Serotonin Modulators Passed - 3/28/2022  9:56 AM        Passed - Recent (12 mo) or future (30 days) visit within the authorizing provider's specialty     Patient has had an office visit with the authorizing provider or a provider within the authorizing providers department within the previous 12 mos or has a future within next 30 days. See \"Patient Info\" tab in inbasket, or \"Choose Columns\" in Meds & Orders section of the refill encounter.              Passed - Medication is active on med list        Passed - Patient is age 18 or older        Passed - No active pregnancy on record        Passed - No positive pregnancy test in past 12 months           Thi Vila RN  [facility]     "

## 2022-05-17 DIAGNOSIS — Z91.09 ENVIRONMENTAL ALLERGIES: ICD-10-CM

## 2022-05-17 RX ORDER — FLUTICASONE PROPIONATE 50 MCG
2 SPRAY, SUSPENSION (ML) NASAL DAILY
Qty: 18 ML | Refills: 1 | Status: SHIPPED | OUTPATIENT
Start: 2022-05-17 | End: 2022-08-16

## 2022-05-17 NOTE — TELEPHONE ENCOUNTER
Prescription approved per [facility] Refill Protocol.    Елена Hargrove RN  [facility]

## 2022-06-15 DIAGNOSIS — Z91.09 ENVIRONMENTAL ALLERGIES: ICD-10-CM

## 2022-06-15 NOTE — TELEPHONE ENCOUNTER
Reason for Call:  Medication or medication refill:    Do you use a [facility]?  Name of the pharmacy and phone number for the current request: [facility] [place], [place] - [address]    Name of the medication requested: pseudoePHEDrine (SUDAFED) 30 MG tablet    Other request: N/A    Can we leave a detailed message on this number? YES    Phone number patient can be reached at: Home number on file [phone] (home)    Best Time: ANY    Call taken on 6/15/2022 at 11:37 AM by Ny Porter

## 2022-06-15 NOTE — TELEPHONE ENCOUNTER
Routing refill request to provider for review/approval because:  Drug not on the [facility] refill protocol   Yuni GREEN RN

## 2022-06-20 RX ORDER — PSEUDOEPHEDRINE HCL 30 MG
30 TABLET ORAL EVERY 4 HOURS PRN
Qty: 90 TABLET | Refills: 3 | OUTPATIENT
Start: 2022-06-20

## 2022-07-01 ENCOUNTER — THERAPY VISIT (OUTPATIENT)
Dept: PHYSICAL THERAPY | Facility: CLINIC | Age: 47
End: 2022-07-01
Payer: COMMERCIAL

## 2022-07-01 DIAGNOSIS — N39.46 MIXED STRESS AND URGE URINARY INCONTINENCE: Primary | ICD-10-CM

## 2022-07-01 PROCEDURE — 97535 SELF CARE MNGMENT TRAINING: CPT | Mod: GP | Performed by: PHYSICAL THERAPIST

## 2022-07-01 PROCEDURE — 97161 PT EVAL LOW COMPLEX 20 MIN: CPT | Mod: GP | Performed by: PHYSICAL THERAPIST

## 2022-07-01 PROCEDURE — 97110 THERAPEUTIC EXERCISES: CPT | Mod: GP | Performed by: PHYSICAL THERAPIST

## 2022-07-01 NOTE — PROGRESS NOTES
Physical Therapy Initial Evaluation  Subjective:  The history is provided by the patient. No  was used.   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  6 pounds 9 ounces.  Pt reports continued DURGA with sneezing, coughing, running and requiring use of pad everyday.  2019:  did 3 Aparna Michelle touch treatments and noted some improvement, but still experiences DURGA with exercise.  She started PT last year but became busy and needed to discontinue visits.     Urination: good stream; able to stop flow; urinary frequency is high-every hour.  Sleeping through the night.  Water intake is good.     Bowel: constipation increased since juan manuel-menopause.  Takes magnesium which helps.     Pelvic pain: none     Activity/exercise: goal to reduce leakage with DURGA.  .         Type of problem:  Incontinence.     This is a chronic condition.  Condition occurred with:  During pregnancy.  Where condition occurred: other.  Patient reports pain:  N/a.        Since onset symptoms are unchanged.  Symptoms are exacerbated by laughing, coughing, sneezing, running, jumping and walking  and relieved by other.     Previous treatment includes other. There was mild improvement following previous treatment.  Restrictions due to condition include:  Working in normal job without restrictions.  Barriers include:  None as reported by patient.      .                                                        Objective:  System                                 Pelvic Dysfunction Evaluation:          Abdominal Wall:  Abdominal wall pelvic: No evidence of separation.  Abdominal bulging, trunk flexion observed with cuing for TrA, PFM contraction, and bearing down.  Diastasis Recti:  Normal      Pelvic Clock Exam:    Ischiocavernosis pain:  -  Bulbocavernosis pain:  -  Transverse Perineal:  -  Levator ANI:  -  Perineal  Body:  -      External Assessment:  External assessment pelvic: decreased perineal decscent, inc abdominal bulging with cuing for bulge/relaxation.  Skin Condition:  Normal          Muscle Contraction/Perineal Mobility:  Elevation and urogential triangle descent and substitution  Internal Assessment:      Contraction/Grade:  Fair squeeze, definite lift (3)    Accessory Muscle use-Gluteals:  Yes  Accessory Muscle use-Adductors:  Yes  Symmetry of Contraction Response:  Porr coordination with abdominals                       General     ROS    Assessment/Plan:    Patient is a 46 year old female with pelvic complaints.    Patient has the following significant findings with corresponding treatment plan.                Diagnosis 1:  DURGA  Decreased strength - therapeutic exercise and therapeutic activities  Decreased proprioception - neuro re-education and therapeutic activities  Impaired muscle performance - biofeedback and neuro re-education  Decreased function - therapeutic activities  Impaired posture - neuro re-education    Therapy Evaluation Codes:   1) History comprised of:   Personal factors that impact the plan of care:      Time since onset of symptoms.    Comorbidity factors that impact the plan of care are:      Menopausal.     Medications impacting care: None.  2) Examination of Body Systems comprised of:   Body structures and functions that impact the plan of care:      Pelvis.   Activity limitations that impact the plan of care are:      Stress incontinence.  3) Clinical presentation characteristics are:   Stable/Uncomplicated.  4) Decision-Making    Low complexity using standardized patient assessment instrument and/or measureable assessment of functional outcome.  Cumulative Therapy Evaluation is: Low complexity.    Previous and current functional limitations:  (See Goal Flow Sheet for this information)    Short term and Long term goals: (See Goal Flow Sheet for this information)     Communication ability:   Patient appears to be able to clearly communicate and understand verbal and written communication and follow directions correctly.  Treatment Explanation - The following has been discussed with the patient:   RX ordered/plan of care  Anticipated outcomes  Possible risks and side effects  This patient would benefit from PT intervention to resume normal activities.   Rehab potential is good.    Frequency:  1 X week, once daily every 3 weeks  Duration:  for 12 weeks  Discharge Plan:  Achieve all LTG.  Independent in home treatment program.  Reach maximal therapeutic benefit.    Please refer to the daily flowsheet for treatment today, total treatment time and time spent performing 1:1 timed codes.

## 2022-07-11 DIAGNOSIS — G47.00 INSOMNIA, UNSPECIFIED TYPE: ICD-10-CM

## 2022-07-11 RX ORDER — TRAZODONE HYDROCHLORIDE 50 MG/1
50-100 TABLET, FILM COATED ORAL AT BEDTIME
Qty: 180 TABLET | Refills: 0 | Status: SHIPPED | OUTPATIENT
Start: 2022-07-11 | End: 2022-09-15

## 2022-07-11 NOTE — TELEPHONE ENCOUNTER
"Requested Prescriptions   Pending Prescriptions Disp Refills     traZODone (DESYREL) 50 MG tablet 180 tablet 0     Sig: Take 1-2 tablets ( mg) by mouth At Bedtime       Serotonin Modulators Passed - 7/11/2022  7:41 AM        Passed - Recent (12 mo) or future (30 days) visit within the authorizing provider's specialty     Patient has had an office visit with the authorizing provider or a provider within the authorizing providers department within the previous 12 mos or has a future within next 30 days. See \"Patient Info\" tab in inbasket, or \"Choose Columns\" in Meds & Orders section of the refill encounter.              Passed - Medication is active on med list        Passed - Patient is age 18 or older        Passed - No active pregnancy on record        Passed - No positive pregnancy test in past 12 months           Refilled per protocol.  Cira YOST RN    "

## 2022-07-18 ENCOUNTER — E-VISIT (OUTPATIENT)
Dept: URGENT CARE | Facility: CLINIC | Age: 47
End: 2022-07-18
Payer: COMMERCIAL

## 2022-07-18 DIAGNOSIS — J01.90 ACUTE BACTERIAL SINUSITIS: Primary | ICD-10-CM

## 2022-07-18 DIAGNOSIS — B96.89 ACUTE BACTERIAL SINUSITIS: Primary | ICD-10-CM

## 2022-07-18 PROCEDURE — 99421 OL DIG E/M SVC 5-10 MIN: CPT | Performed by: FAMILY MEDICINE

## 2022-07-18 RX ORDER — DOXYCYCLINE HYCLATE 100 MG
100 TABLET ORAL 2 TIMES DAILY
Qty: 14 TABLET | Refills: 0 | Status: SHIPPED | OUTPATIENT
Start: 2022-07-18 | End: 2022-07-25

## 2022-08-15 DIAGNOSIS — Z91.09 ENVIRONMENTAL ALLERGIES: ICD-10-CM

## 2022-08-16 RX ORDER — FLUTICASONE PROPIONATE 50 MCG
2 SPRAY, SUSPENSION (ML) NASAL DAILY
Qty: 18 ML | Refills: 1 | Status: SHIPPED | OUTPATIENT
Start: 2022-08-16 | End: 2022-11-29

## 2022-08-16 NOTE — TELEPHONE ENCOUNTER
"Requested Prescriptions   Pending Prescriptions Disp Refills     fluticasone (FLONASE) 50 MCG/ACT nasal spray 18 mL 1     Sig: Spray 2 sprays into both nostrils daily       Nasal Allergy Protocol Passed - 8/15/2022  9:02 AM        Passed - Patient is age 12 or older        Passed - Recent (12 mo) or future (30 days) visit within the authorizing provider's specialty     Patient has had an office visit with the authorizing provider or a provider within the authorizing providers department within the previous 12 mos or has a future within next 30 days. See \"Patient Info\" tab in inbasket, or \"Choose Columns\" in Meds & Orders section of the refill encounter.              Passed - Medication is active on med list           Refilled per protocol.  Cira YOST RN    "

## 2022-08-26 ENCOUNTER — LAB (OUTPATIENT)
Dept: URGENT CARE | Facility: URGENT CARE | Age: 47
End: 2022-08-26
Attending: FAMILY MEDICINE
Payer: COMMERCIAL

## 2022-08-26 DIAGNOSIS — Z20.822 SUSPECTED 2019 NOVEL CORONAVIRUS INFECTION: ICD-10-CM

## 2022-08-26 PROCEDURE — U0003 INFECTIOUS AGENT DETECTION BY NUCLEIC ACID (DNA OR RNA); SEVERE ACUTE RESPIRATORY SYNDROME CORONAVIRUS 2 (SARS-COV-2) (CORONAVIRUS DISEASE [COVID-19]), AMPLIFIED PROBE TECHNIQUE, MAKING USE OF HIGH THROUGHPUT TECHNOLOGIES AS DESCRIBED BY CMS-2020-01-R: HCPCS

## 2022-08-26 PROCEDURE — U0005 INFEC AGEN DETEC AMPLI PROBE: HCPCS

## 2022-08-27 LAB — SARS-COV-2 RNA RESP QL NAA+PROBE: NEGATIVE

## 2022-08-29 ENCOUNTER — LAB REQUISITION (OUTPATIENT)
Dept: LAB | Facility: CLINIC | Age: 47
End: 2022-08-29

## 2022-08-29 LAB — SARS-COV-2 RNA RESP QL NAA+PROBE: NEGATIVE

## 2022-08-29 PROCEDURE — U0003 INFECTIOUS AGENT DETECTION BY NUCLEIC ACID (DNA OR RNA); SEVERE ACUTE RESPIRATORY SYNDROME CORONAVIRUS 2 (SARS-COV-2) (CORONAVIRUS DISEASE [COVID-19]), AMPLIFIED PROBE TECHNIQUE, MAKING USE OF HIGH THROUGHPUT TECHNOLOGIES AS DESCRIBED BY CMS-2020-01-R: HCPCS | Performed by: INTERNAL MEDICINE

## 2022-09-15 DIAGNOSIS — G47.00 INSOMNIA, UNSPECIFIED TYPE: ICD-10-CM

## 2022-09-15 RX ORDER — TRAZODONE HYDROCHLORIDE 50 MG/1
50-100 TABLET, FILM COATED ORAL AT BEDTIME
Qty: 180 TABLET | Refills: 0 | Status: SHIPPED | OUTPATIENT
Start: 2022-09-15 | End: 2022-12-21

## 2022-09-15 NOTE — TELEPHONE ENCOUNTER
Routing refill request to provider for review/approval because:  Drug interaction warning    Елена Hargrove RN  [facility]

## 2022-09-30 ENCOUNTER — THERAPY VISIT (OUTPATIENT)
Dept: PHYSICAL THERAPY | Facility: CLINIC | Age: 47
End: 2022-09-30
Payer: COMMERCIAL

## 2022-09-30 DIAGNOSIS — N39.46 MIXED STRESS AND URGE URINARY INCONTINENCE: Primary | ICD-10-CM

## 2022-09-30 PROCEDURE — 97110 THERAPEUTIC EXERCISES: CPT | Mod: GP | Performed by: PHYSICAL THERAPIST

## 2022-10-03 ENCOUNTER — LAB REQUISITION (OUTPATIENT)
Dept: LAB | Facility: CLINIC | Age: 47
End: 2022-10-03

## 2022-10-03 LAB — SARS-COV-2 RNA RESP QL NAA+PROBE: NEGATIVE

## 2022-10-03 PROCEDURE — U0005 INFEC AGEN DETEC AMPLI PROBE: HCPCS | Performed by: INTERNAL MEDICINE

## 2022-10-06 ENCOUNTER — LAB REQUISITION (OUTPATIENT)
Dept: LAB | Facility: CLINIC | Age: 47
End: 2022-10-06

## 2022-10-06 LAB — SARS-COV-2 RNA RESP QL NAA+PROBE: NEGATIVE

## 2022-10-06 PROCEDURE — U0005 INFEC AGEN DETEC AMPLI PROBE: HCPCS | Performed by: INTERNAL MEDICINE

## 2022-10-28 ENCOUNTER — THERAPY VISIT (OUTPATIENT)
Dept: PHYSICAL THERAPY | Facility: CLINIC | Age: 47
End: 2022-10-28
Payer: COMMERCIAL

## 2022-10-28 DIAGNOSIS — F41.8 SITUATIONAL ANXIETY: ICD-10-CM

## 2022-10-28 DIAGNOSIS — N39.46 MIXED STRESS AND URGE URINARY INCONTINENCE: Primary | ICD-10-CM

## 2022-10-28 PROCEDURE — 97110 THERAPEUTIC EXERCISES: CPT | Mod: GP | Performed by: PHYSICAL THERAPIST

## 2022-10-28 RX ORDER — SERTRALINE HYDROCHLORIDE 100 MG/1
100 TABLET, FILM COATED ORAL DAILY
Qty: 90 TABLET | Refills: 1 | Status: SHIPPED | OUTPATIENT
Start: 2022-10-28 | End: 2022-12-21

## 2022-10-28 NOTE — TELEPHONE ENCOUNTER
"Requested Prescriptions   Pending Prescriptions Disp Refills     sertraline (ZOLOFT) 100 MG tablet 90 tablet 1     Sig: Take 1 tablet (100 mg) by mouth daily       SSRIs Protocol Passed - 10/28/2022  3:30 PM        Passed - Recent (12 mo) or future (30 days) visit within the authorizing provider's specialty     Patient has had an office visit with the authorizing provider or a provider within the authorizing providers department within the previous 12 mos or has a future within next 30 days. See \"Patient Info\" tab in inbasket, or \"Choose Columns\" in Meds & Orders section of the refill encounter.              Passed - Medication is active on med list        Passed - Patient is age 18 or older        Passed - No active pregnancy on record        Passed - No positive pregnancy test in last 12 months             Prescription approved per [facility] Refill Protocol.    Pt has an appointment on 12/21/22    Matilde Louis RN  [facility]    "

## 2022-10-31 NOTE — PROGRESS NOTES
PROGRESS  REPORT    Progress reporting period is from 9/30/2022 to 10/28/2022.       SUBJECTIVE  Subjective changes noted by patient:  yes.  Subjective: Going pretty good.  I have noticed I get leakage during specific workout position with weights.    Current pain level is 0/10  .     Previous pain level was  0/10  .   Changes in function:  Yes (See Goal flowsheet attached for changes in current functional level)  Adverse reaction to treatment or activity: None    OBJECTIVE  Changes noted in objective findings:  Yes  Objective: DURGA limited to harder activities involving increase in IAP     ASSESSMENT/PLAN  Updated problem list and treatment plan: Diagnosis 1:  DURGA  Decreased proprioception - neuro re-education and therapeutic activities  Impaired muscle performance - neuro re-education  Decreased function - therapeutic activities  STG/LTGs have been met or progress has been made towards goals:  Yes (See Goal flow sheet completed today.)  Assessment of Progress: The patient's condition is improving.  Self Management Plans:  Patient has been instructed in a home treatment program.  I have re-evaluated this patient and find that the nature, scope, duration and intensity of the therapy is appropriate for the medical condition of the patient.  Aditi continues to require the following intervention to meet STG and LTG's:  PT    Recommendations:  This patient would benefit from continued therapy.     Frequency:  1 X a month, once daily  Duration:  for 2 months        Please refer to the daily flowsheet for treatment today, total treatment time and time spent performing 1:1 timed codes.

## 2022-11-28 ENCOUNTER — ANCILLARY PROCEDURE (OUTPATIENT)
Dept: MAMMOGRAPHY | Facility: CLINIC | Age: 47
End: 2022-11-28
Attending: NURSE PRACTITIONER
Payer: COMMERCIAL

## 2022-11-28 DIAGNOSIS — Z12.31 VISIT FOR SCREENING MAMMOGRAM: ICD-10-CM

## 2022-11-28 PROCEDURE — 77067 SCR MAMMO BI INCL CAD: CPT

## 2022-11-28 PROCEDURE — 77067 SCR MAMMO BI INCL CAD: CPT | Mod: 26 | Performed by: STUDENT IN AN ORGANIZED HEALTH CARE EDUCATION/TRAINING PROGRAM

## 2022-11-28 PROCEDURE — 77063 BREAST TOMOSYNTHESIS BI: CPT | Mod: 26 | Performed by: STUDENT IN AN ORGANIZED HEALTH CARE EDUCATION/TRAINING PROGRAM

## 2022-11-29 DIAGNOSIS — Z91.09 ENVIRONMENTAL ALLERGIES: ICD-10-CM

## 2022-11-29 RX ORDER — FLUTICASONE PROPIONATE 50 MCG
2 SPRAY, SUSPENSION (ML) NASAL DAILY
Qty: 18 ML | Refills: 1 | Status: SHIPPED | OUTPATIENT
Start: 2022-11-29 | End: 2022-12-21

## 2022-11-29 RX ORDER — AZELASTINE 1 MG/ML
1 SPRAY, METERED NASAL AT BEDTIME
Qty: 90 ML | Refills: 11 | Status: SHIPPED | OUTPATIENT
Start: 2022-11-29 | End: 2023-06-05

## 2022-11-29 NOTE — TELEPHONE ENCOUNTER
"Requested Prescriptions   Pending Prescriptions Disp Refills     azelastine (ASTELIN) 0.1 % nasal spray 90 mL 11     Sig: Spray 1 spray into both nostrils At Bedtime       Antihistamines Protocol Passed - 11/29/2022  9:46 AM        Passed - Patient is 3-64 years of age     Apply weight-based dosing for peds patients age 3 - 12 years of age.    Forward request to provider for patients under the age of 3 or over the age of 64.          Passed - Recent (12 mo) or future (30 days) visit within the authorizing provider's specialty     Patient has had an office visit with the authorizing provider or a provider within the authorizing providers department within the previous 12 mos or has a future within next 30 days. See \"Patient Info\" tab in inbasket, or \"Choose Columns\" in Meds & Orders section of the refill encounter.              Passed - Medication is active on med list       Nasal Allergy Protocol Passed - 11/29/2022  9:46 AM        Passed - Patient is age 12 or older        Passed - Recent (12 mo) or future (30 days) visit within the authorizing provider's specialty     Patient has had an office visit with the authorizing provider or a provider within the authorizing providers department within the previous 12 mos or has a future within next 30 days. See \"Patient Info\" tab in inbasket, or \"Choose Columns\" in Meds & Orders section of the refill encounter.              Passed - Medication is active on med list           fluticasone (FLONASE) 50 MCG/ACT nasal spray 18 mL 1     Sig: Spray 2 sprays into both nostrils daily       Nasal Allergy Protocol Passed - 11/29/2022  9:46 AM        Passed - Patient is age 12 or older        Passed - Recent (12 mo) or future (30 days) visit within the authorizing provider's specialty     Patient has had an office visit with the authorizing provider or a provider within the authorizing providers department within the previous 12 mos or has a future within next 30 days. See \"Patient " "Info\" tab in inbasket, or \"Choose Columns\" in Meds & Orders section of the refill encounter.              Passed - Medication is active on med list         Prescription approved per [facility] Refill Protocol.    Pt has appointment 12/21/22    Franky Oseguera RN   [facility]    "

## 2022-12-07 ENCOUNTER — ANCILLARY PROCEDURE (OUTPATIENT)
Dept: MAMMOGRAPHY | Facility: CLINIC | Age: 47
End: 2022-12-07
Attending: NURSE PRACTITIONER
Payer: COMMERCIAL

## 2022-12-07 DIAGNOSIS — R92.8 ABNORMAL MAMMOGRAM OF LEFT BREAST: ICD-10-CM

## 2022-12-07 PROCEDURE — G0279 TOMOSYNTHESIS, MAMMO: HCPCS | Mod: LT | Performed by: RADIOLOGY

## 2022-12-07 PROCEDURE — 76642 ULTRASOUND BREAST LIMITED: CPT | Mod: LT | Performed by: RADIOLOGY

## 2022-12-07 PROCEDURE — 77065 DX MAMMO INCL CAD UNI: CPT | Mod: LT | Performed by: RADIOLOGY

## 2022-12-13 ENCOUNTER — ANCILLARY PROCEDURE (OUTPATIENT)
Dept: MAMMOGRAPHY | Facility: CLINIC | Age: 47
End: 2022-12-13
Attending: NURSE PRACTITIONER
Payer: COMMERCIAL

## 2022-12-13 DIAGNOSIS — R92.8 ABNORMAL MAMMOGRAM OF LEFT BREAST: ICD-10-CM

## 2022-12-13 PROCEDURE — 88305 TISSUE EXAM BY PATHOLOGIST: CPT | Mod: TC | Performed by: NURSE PRACTITIONER

## 2022-12-13 PROCEDURE — G0279 TOMOSYNTHESIS, MAMMO: HCPCS | Performed by: RADIOLOGY

## 2022-12-13 PROCEDURE — 77066 DX MAMMO INCL CAD BI: CPT | Performed by: RADIOLOGY

## 2022-12-13 PROCEDURE — 19083 BX BREAST 1ST LESION US IMAG: CPT | Mod: LT | Performed by: STUDENT IN AN ORGANIZED HEALTH CARE EDUCATION/TRAINING PROGRAM

## 2022-12-13 PROCEDURE — 88305 TISSUE EXAM BY PATHOLOGIST: CPT | Mod: 26 | Performed by: PATHOLOGY

## 2022-12-13 RX ORDER — LIDOCAINE HYDROCHLORIDE 10 MG/ML
10 INJECTION, SOLUTION EPIDURAL; INFILTRATION; INTRACAUDAL; PERINEURAL ONCE
Status: COMPLETED | OUTPATIENT
Start: 2022-12-13 | End: 2022-12-13

## 2022-12-13 RX ORDER — IOPAMIDOL 755 MG/ML
100 INJECTION, SOLUTION INTRAVASCULAR ONCE
Status: COMPLETED | OUTPATIENT
Start: 2022-12-13 | End: 2022-12-13

## 2022-12-13 RX ADMIN — IOPAMIDOL 80 ML: 755 INJECTION, SOLUTION INTRAVASCULAR at 09:24

## 2022-12-13 RX ADMIN — LIDOCAINE HYDROCHLORIDE 10 ML: 10 INJECTION, SOLUTION EPIDURAL; INFILTRATION; INTRACAUDAL; PERINEURAL at 10:01

## 2022-12-16 ENCOUNTER — TELEPHONE (OUTPATIENT)
Dept: MAMMOGRAPHY | Facility: CLINIC | Age: 47
End: 2022-12-16

## 2022-12-16 LAB
PATH REPORT.COMMENTS IMP SPEC: NORMAL
PATH REPORT.COMMENTS IMP SPEC: NORMAL
PATH REPORT.FINAL DX SPEC: NORMAL
PATH REPORT.GROSS SPEC: NORMAL
PATH REPORT.MICROSCOPIC SPEC OTHER STN: NORMAL
PATH REPORT.RELEVANT HX SPEC: NORMAL
PHOTO IMAGE: NORMAL

## 2022-12-16 ASSESSMENT — ENCOUNTER SYMPTOMS
ARTHRALGIAS: 0
PALPITATIONS: 0
CHILLS: 0
MYALGIAS: 0
COUGH: 0
WEAKNESS: 0
JOINT SWELLING: 0
NERVOUS/ANXIOUS: 0
NAUSEA: 0
CONSTIPATION: 0
HEADACHES: 0
HEMATOCHEZIA: 0
PARESTHESIAS: 0
FREQUENCY: 0
DIZZINESS: 0
DIARRHEA: 0
SHORTNESS OF BREATH: 0
SORE THROAT: 0
FEVER: 0
HEARTBURN: 0
DYSURIA: 0
BREAST MASS: 1
EYE PAIN: 0
ABDOMINAL PAIN: 0
HEMATURIA: 0

## 2022-12-16 NOTE — TELEPHONE ENCOUNTER
Spoke to Aditi about the benign finding of a fibroadenoma found during her breast biopsy done earlier this week.  We discussed the Radiologist's recommendation of continuing on with her yearly screening mammograms.  Aditi verbalized understanding and all questions and concerns were answered at this time.

## 2022-12-18 ENCOUNTER — E-VISIT (OUTPATIENT)
Dept: URGENT CARE | Facility: CLINIC | Age: 47
End: 2022-12-18
Payer: COMMERCIAL

## 2022-12-18 DIAGNOSIS — J01.90 ACUTE BACTERIAL SINUSITIS: Primary | ICD-10-CM

## 2022-12-18 DIAGNOSIS — B96.89 ACUTE BACTERIAL SINUSITIS: Primary | ICD-10-CM

## 2022-12-18 PROCEDURE — 99421 OL DIG E/M SVC 5-10 MIN: CPT | Performed by: FAMILY MEDICINE

## 2022-12-18 RX ORDER — DOXYCYCLINE HYCLATE 100 MG
100 TABLET ORAL 2 TIMES DAILY
Qty: 14 TABLET | Refills: 0 | Status: SHIPPED | OUTPATIENT
Start: 2022-12-18 | End: 2022-12-25

## 2022-12-18 NOTE — PATIENT INSTRUCTIONS
Dear Aditi Osuna    After reviewing your responses, I've been able to diagnose you with Acute bacterial sinusitis.      Based on your responses and diagnosis, I have prescribed   Orders Placed This Encounter     doxycycline hyclate (VIBRA-TABS) 100 MG tablet    to treat your symptoms. I have sent this to your pharmacy.?     It is also important to stay well hydrated, get lots of rest and take over-the-counter decongestants,?tylenol?or ibuprofen if you?are able to?take those medications per your primary care provider to help relieve discomfort.?     It is important that you take?all of?your prescribed medication even if your symptoms are improving after a few doses.? Taking?all of?your medicine helps prevent the symptoms from returning.?     If your symptoms worsen, you develop severe headache, vomiting, high fever (>102), or are not improving in 7 days, please contact your primary care provider for an appointment or visit any of our convenient Walk-in Care or Urgent Care Centers to be seen which can be found on our website?here.?     Thanks again for choosing?us?as your health care partner,?   ?  Melissa Almonte MD?

## 2022-12-21 ENCOUNTER — OFFICE VISIT (OUTPATIENT)
Dept: FAMILY MEDICINE | Facility: CLINIC | Age: 47
End: 2022-12-21
Payer: COMMERCIAL

## 2022-12-21 VITALS
DIASTOLIC BLOOD PRESSURE: 82 MMHG | TEMPERATURE: 98.3 F | OXYGEN SATURATION: 97 % | BODY MASS INDEX: 25.19 KG/M2 | RESPIRATION RATE: 17 BRPM | HEART RATE: 75 BPM | WEIGHT: 160.5 LBS | SYSTOLIC BLOOD PRESSURE: 126 MMHG | HEIGHT: 67 IN

## 2022-12-21 DIAGNOSIS — Z80.8 FAMILY HISTORY OF BASAL CELL CARCINOMA: ICD-10-CM

## 2022-12-21 DIAGNOSIS — Z11.4 SCREENING FOR HIV (HUMAN IMMUNODEFICIENCY VIRUS): ICD-10-CM

## 2022-12-21 DIAGNOSIS — Z12.83 SKIN CANCER SCREENING: ICD-10-CM

## 2022-12-21 DIAGNOSIS — Z80.0 FAMILY HISTORY OF COLON CANCER: Primary | ICD-10-CM

## 2022-12-21 DIAGNOSIS — F41.8 SITUATIONAL ANXIETY: ICD-10-CM

## 2022-12-21 DIAGNOSIS — Z12.11 SCREEN FOR COLON CANCER: ICD-10-CM

## 2022-12-21 DIAGNOSIS — Z91.09 ENVIRONMENTAL ALLERGIES: ICD-10-CM

## 2022-12-21 DIAGNOSIS — F41.1 GAD (GENERALIZED ANXIETY DISORDER): ICD-10-CM

## 2022-12-21 DIAGNOSIS — Z11.59 NEED FOR HEPATITIS C SCREENING TEST: ICD-10-CM

## 2022-12-21 DIAGNOSIS — N39.46 MIXED STRESS AND URGE URINARY INCONTINENCE: ICD-10-CM

## 2022-12-21 DIAGNOSIS — G47.00 INSOMNIA, UNSPECIFIED TYPE: ICD-10-CM

## 2022-12-21 PROCEDURE — 99214 OFFICE O/P EST MOD 30 MIN: CPT | Mod: 25 | Performed by: NURSE PRACTITIONER

## 2022-12-21 PROCEDURE — 99396 PREV VISIT EST AGE 40-64: CPT | Performed by: NURSE PRACTITIONER

## 2022-12-21 RX ORDER — BUPROPION HYDROCHLORIDE 150 MG/1
150 TABLET ORAL EVERY MORNING
Qty: 90 TABLET | Refills: 1 | Status: SHIPPED | OUTPATIENT
Start: 2022-12-21 | End: 2023-03-06

## 2022-12-21 RX ORDER — TRAZODONE HYDROCHLORIDE 50 MG/1
50-100 TABLET, FILM COATED ORAL AT BEDTIME
Qty: 180 TABLET | Refills: 0 | Status: SHIPPED | OUTPATIENT
Start: 2022-12-21 | End: 2023-06-05

## 2022-12-21 RX ORDER — AZELASTINE 1 MG/ML
1 SPRAY, METERED NASAL 2 TIMES DAILY
Qty: 30 ML | Refills: 3 | Status: SHIPPED | OUTPATIENT
Start: 2022-12-21 | End: 2023-12-21

## 2022-12-21 RX ORDER — FLUTICASONE PROPIONATE 50 MCG
2 SPRAY, SUSPENSION (ML) NASAL DAILY
Qty: 18 ML | Refills: 1 | Status: SHIPPED | OUTPATIENT
Start: 2022-12-21 | End: 2023-04-14

## 2022-12-21 RX ORDER — SERTRALINE HYDROCHLORIDE 100 MG/1
100 TABLET, FILM COATED ORAL DAILY
Qty: 90 TABLET | Refills: 3 | Status: SHIPPED | OUTPATIENT
Start: 2022-12-21 | End: 2023-03-06

## 2022-12-21 RX ORDER — HYDROXYZINE PAMOATE 50 MG/1
50-100 CAPSULE ORAL
Qty: 180 CAPSULE | Refills: 3 | Status: SHIPPED | OUTPATIENT
Start: 2022-12-21 | End: 2023-12-21

## 2022-12-21 ASSESSMENT — ENCOUNTER SYMPTOMS
EYE PAIN: 0
HEARTBURN: 0
HEMATOCHEZIA: 0
HEMATURIA: 0
CHILLS: 0
SORE THROAT: 0
ABDOMINAL PAIN: 0
BREAST MASS: 1
COUGH: 0
PALPITATIONS: 0
HEADACHES: 0
SHORTNESS OF BREATH: 0
PARESTHESIAS: 0
DIZZINESS: 0
CONSTIPATION: 0
FREQUENCY: 0
NERVOUS/ANXIOUS: 0
NAUSEA: 0
WEAKNESS: 0
DIARRHEA: 0
DYSURIA: 0
JOINT SWELLING: 0
ARTHRALGIAS: 0
MYALGIAS: 0
FEVER: 0

## 2022-12-21 ASSESSMENT — PAIN SCALES - GENERAL: PAINLEVEL: NO PAIN (0)

## 2022-12-21 NOTE — PROGRESS NOTES
SUBJECTIVE:   CC: Aditi is an 47 year old who presents for preventive health visit.   Patient has been advised of split billing requirements and indicates understanding: Yes  Healthy Habits:     Getting at least 3 servings of Calcium per day:  Yes    Bi-annual eye exam:  Yes    Dental care twice a year:  Yes    Sleep apnea or symptoms of sleep apnea:  None    Diet:  Regular (no restrictions)    Frequency of exercise:  2-3 days/week    Duration of exercise:  45-60 minutes    Taking medications regularly:  Yes    Medication side effects:  Not applicable    PHQ-2 Total Score: 0    Additional concerns today:  No  Has been on zoloft for about the past year. Has been in a relationship with a female partner the past 6 months and noticed inability to orgasm. She has tried cutting back to Zoloft 50 mg and noticed anxiety coming back.  Tried Prozac many years ago and didn't feel well.  Has tried wellbutrin previously- no side effects. Not sure if it was helpful for anxiety.   She will be traveling to [place] in the next month for her 6 month anniversary and leaving her 8 year old, which she is stressed about.     Has been doing pelvic floor physical therapy, helpful for incontinence issues she has had since the birth of her daughter.    Has been taking Trazodone for sleep; works well; and hydroxyzine as needed for sleep/anxiety.      -------------------------------------    Today's PHQ-2 Score:   PHQ-2 ( 1999 Pf[employer]) 12/16/2022   Q1: Little interest or pleasure in doing things 0   Q2: Feeling down, depressed or hopeless 0   PHQ-2 Score 0   PHQ-2 Total Score (12-17 Years)- Positive if 3 or more points; Administer PHQ-A if positive -   Q1: Little interest or pleasure in doing things Not at all   Q2: Feeling down, depressed or hopeless Not at all   PHQ-2 Score 0           Social History     Tobacco Use     Smoking status: Never     Smokeless tobacco: Never   Substance Use Topics     Alcohol use: Yes     If you drink  alcohol do you typically have >3 drinks per day or >7 drinks per week? No    Alcohol Use 12/21/2022   Prescreen: >3 drinks/day or >7 drinks/week? -   Prescreen: >3 drinks/day or >7 drinks/week? No       Reviewed orders with patient.  Reviewed health maintenance and updated orders accordingly -  Breast Cancer Screening:    FHS-7:   Breast CA Risk Assessment (FHS-7) 10/27/2021 11/28/2022 12/16/2022   Did any of your first-degree relatives have breast or ovarian cancer? No No No   Did any of your relatives have bilateral breast cancer? No No Unknown   Did any man in your family have breast cancer? No No No   Did any woman in your family have breast and ovarian cancer? No No No   Did any woman in your family have breast cancer before age 50 y? No No No   Do you have 2 or more relatives with breast and/or ovarian cancer? No No -   Do you have 2 or more relatives with breast and/or bowel cancer? No No No     Pertinent mammograms are reviewed under the imaging tab.    History of abnormal Pap smear: NO - age 30-65 PAP every 5 years with negative HPV co-testing recommended  PAP / HPV Latest Ref Rng & Units 11/17/2021   PAP   Negative for Intraepithelial Lesion or Malignancy (NILM)   HPV16 Negative Negative   HPV18 Negative Negative   HRHPV Negative Negative     Reviewed and updated as needed this visit by clinical staff   Tobacco   Meds   Med Hx  Surg Hx  Fam Hx          Reviewed and updated as needed this visit by Provider   Tobacco     Med Hx  Surg Hx  Fam Hx             Review of Systems   Constitutional: Negative for chills and fever.   HENT: Negative for congestion, ear pain, hearing loss and sore throat.    Eyes: Negative for pain and visual disturbance.   Respiratory: Negative for cough and shortness of breath.    Cardiovascular: Negative for chest pain, palpitations and peripheral edema.   Gastrointestinal: Negative for abdominal pain, constipation, diarrhea, heartburn, hematochezia and nausea.   Breasts:   "Positive for breast mass. Negative for tenderness and discharge.   Genitourinary: Negative for dysuria, frequency, genital sores, hematuria, pelvic pain, urgency, vaginal bleeding and vaginal discharge.   Musculoskeletal: Negative for arthralgias, joint swelling and myalgias.   Skin: Negative for rash.   Neurological: Negative for dizziness, weakness, headaches and paresthesias.   Psychiatric/Behavioral: Negative for mood changes. The patient is not nervous/anxious.           OBJECTIVE:   /82   Pulse 75   Temp 98.3  F (36.8  C) (Temporal)   Resp 17   Ht 1.69 m (5' 6.54\")   Wt 72.8 kg (160 lb 8 oz)   LMP 12/12/2022   SpO2 97%   BMI 25.49 kg/m    Physical Exam  GENERAL: healthy, alert and no distress  HENT: ear canals and TM's normal, nose and mouth without ulcers or lesions  NECK: no adenopathy, no asymmetry, masses, or scars and thyroid normal to palpation  RESP: lungs clear to auscultation - no rales, rhonchi or wheezes  CV: regular rate and rhythm, normal S1 S2, no S3 or S4, no murmur, click or rub, no peripheral edema and peripheral pulses strong  ABDOMEN: soft, nontender, no hepatosplenomegaly, no masses and bowel sounds normal   (female): normal female external genitalia, normal urethral meatus, vaginal mucosa.  MS: no gross musculoskeletal defects noted, no edema  SKIN: no suspicious lesions or rashes  NEURO: Normal strength and tone, mentation intact and speech normal  PSYCH: mentation appears normal, affect normal/bright        ASSESSMENT/PLAN:   Aditi was seen today for physical.    Diagnoses and all orders for this visit:    Family history of colon cancer  -     Colonoscopy Screening  Referral; Future    Screen for colon cancer    Screening for HIV (human immunodeficiency virus)    Need for hepatitis C screening test    Insomnia, unspecified type  -     hydrOXYzine (VISTARIL) 50 MG capsule; Take 1-2 capsules ( mg) by mouth nightly as needed for other (sleep)  -     " "traZODone (DESYREL) 50 MG tablet; Take 1-2 tablets ( mg) by mouth At Bedtime    Situational anxiety  -     sertraline (ZOLOFT) 100 MG tablet; Take 1 tablet (100 mg) by mouth daily    Environmental allergies  -     fluticasone (FLONASE) 50 MCG/ACT nasal spray; Spray 2 sprays into both nostrils daily  -     azelastine (ASTELIN) 0.1 % nasal spray; Spray 1 spray into both nostrils 2 times daily  -     cetirizine HCl 10 MG CAPS; Take 1 capsule (10 mg) by mouth daily    Skin cancer screening  -     Adult Dermatology Referral; Future    TRE (generalized anxiety disorder)  -     buPROPion (WELLBUTRIN XL) 150 MG 24 hr tablet; Take 1 tablet (150 mg) by mouth every morning    Mixed stress and urge urinary incontinence    Family history of basal cell carcinoma    Other orders  -     REVIEW OF HEALTH MAINTENANCE PROTOCOL ORDERS      Add wellbutrin to lessen sexual side effects of zoloft; follow up 2 months.    COUNSELING:  Reviewed preventive health counseling, as reflected in patient instructions       Regular exercise       Healthy diet/nutrition      BMI:   Estimated body mass index is 25.49 kg/m  as calculated from the following:    Height as of this encounter: 1.69 m (5' 6.54\").    Weight as of this encounter: 72.8 kg (160 lb 8 oz).         She reports that she has never smoked. She has never used smokeless tobacco.      NITIN Mendez CNP  [facility]  "

## 2022-12-25 DIAGNOSIS — Z91.09 ENVIRONMENTAL ALLERGIES: ICD-10-CM

## 2022-12-25 RX ORDER — PSEUDOEPHEDRINE HCL 30 MG
30 TABLET ORAL EVERY 4 HOURS PRN
Qty: 48 TABLET | Refills: 0 | Status: SHIPPED | OUTPATIENT
Start: 2022-12-25 | End: 2023-02-21

## 2023-01-03 ENCOUNTER — TELEPHONE (OUTPATIENT)
Dept: GASTROENTEROLOGY | Facility: CLINIC | Age: 48
End: 2023-01-03

## 2023-01-03 ENCOUNTER — HOSPITAL ENCOUNTER (OUTPATIENT)
Facility: AMBULATORY SURGERY CENTER | Age: 48
End: 2023-01-03
Attending: INTERNAL MEDICINE | Admitting: INTERNAL MEDICINE
Payer: COMMERCIAL

## 2023-01-03 NOTE — TELEPHONE ENCOUNTER
Screening Questions  BLUE  KIND OF PREP RED  LOCATION [review exclusion criteria] GREEN  SEDATION TYPE        Y Are you active on mychart?       Rebekah Antunez APRN CNP  Ordering/Referring Provider?        [facility] What type of coverage do you have?      N Have you had a positive covid test in the last 14 days?     25.1 1. BMI  [BMI 40+ - review exclusion criteria]    Y  2. Are you able to give consent for your medical care? [IF NO,RN REVIEW]        N  3. Are you taking any prescription pain medications on a routine schedule?        3a. EXTENDED PREP What kind of prescription?     N 4. Do you have any chemical dependencies such as alcohol, street drugs, or methadone?    N 5. Do you have any history of post-traumatic stress syndrome, severe anxiety or history of psychosis?      **If yes 3- 5 , please schedule with MAC sedation.**          IF YES TO ANY 6 - 10 - HOSPITAL SETTING ONLY.     N 6.   Do you need assistance transferring?     N 7.   Have you had a heart or lung transplant?    N 8.   Are you currently on dialysis?   N 9.   Do you use daily home oxygen?   N 10. Do you take nitroglycerin?   10a.  If yes, how often?     11. [FEMALES]  N Are you currently pregnant?    11a.  If yes, how many weeks? [ Greater than 12 weeks, OR NEEDED]    N 12. Do you have Pulmonary Hypertension? *NEED PAC APPT AT [facility]*     N 13. [review exclusion criteria]  Do you have any implantable devices in your body (pacemaker, defib, LVAD)?    N 14. In the past 6 months, have you had any heart related issues including cardiomyopathy or heart attack?     14a.  If yes, did it require cardiac stenting if so when?     N 15. Have you had a stroke or Transient ischemic attack (TIA - aka  mini stroke ) within 6 months?      N 16. Do you have mod to severe Obstructive Sleep Apnea?  [Hospital only]    N 17. Do you have SEVERE AND UNCONTROLLED asthma? *NEED PAC APPT AT [facility]*     N 18. Are you currently taking any blood thinners?     18a. If yes,  "inform patient to \"follow up w/ ordering provider for bridging instructions.\"    N 19. Do you take the medication Phentermine?    19a. If yes, \"Hold for 7 days before procedure.  Please consult your prescribing provider if you have questions about holding this medication.\"     N  20. Do you have chronic kidney disease?      N  21. Do you have a diagnosis of diabetes?     N  22. On a regular basis do you go 3-5 days between bowel movements?      23. Preferred LOCAL Pharmacy for Pre Prescription    [ LIST ONLY ONE PHARMACY]        [place], [place] - [address]        - CLOSING REMINDERS -    Informed patient they will need an adult    Cannot take any type of public or medical transportation alone    Conscious Sedation- Needs  for 6 hours after the procedure       MAC/General-Needs  for 24 hours after procedure    Pre-Procedure Covid test to be completed [[facility] PCR Testing Required]    Confirmed Nurse will call to complete assessment       - SCHEDULING DETAILS -  no Hospital Setting Required? If yes, what is the exclusion?: n/a   Jim  Surgeon    3/21/23  Date of Procedure  Lower Endoscopy [Colonoscopy]  Type of Procedure Scheduled  [facility]   [facility]-If you answer yes to questions #8, #20, #21Which Colonoscopy Prep was Sent?     Moderate Sedation Type     N PAC / Pre-op Required                 "

## 2023-01-12 ENCOUNTER — LAB REQUISITION (OUTPATIENT)
Dept: LAB | Facility: CLINIC | Age: 48
End: 2023-01-12

## 2023-01-12 LAB — SARS-COV-2 RNA RESP QL NAA+PROBE: NEGATIVE

## 2023-01-12 PROCEDURE — U0003 INFECTIOUS AGENT DETECTION BY NUCLEIC ACID (DNA OR RNA); SEVERE ACUTE RESPIRATORY SYNDROME CORONAVIRUS 2 (SARS-COV-2) (CORONAVIRUS DISEASE [COVID-19]), AMPLIFIED PROBE TECHNIQUE, MAKING USE OF HIGH THROUGHPUT TECHNOLOGIES AS DESCRIBED BY CMS-2020-01-R: HCPCS | Performed by: INTERNAL MEDICINE

## 2023-02-06 ASSESSMENT — ANXIETY QUESTIONNAIRES
GAD7 TOTAL SCORE: 2
7. FEELING AFRAID AS IF SOMETHING AWFUL MIGHT HAPPEN: NOT AT ALL
5. BEING SO RESTLESS THAT IT IS HARD TO SIT STILL: NOT AT ALL
IF YOU CHECKED OFF ANY PROBLEMS ON THIS QUESTIONNAIRE, HOW DIFFICULT HAVE THESE PROBLEMS MADE IT FOR YOU TO DO YOUR WORK, TAKE CARE OF THINGS AT HOME, OR GET ALONG WITH OTHER PEOPLE: SOMEWHAT DIFFICULT
1. FEELING NERVOUS, ANXIOUS, OR ON EDGE: SEVERAL DAYS
3. WORRYING TOO MUCH ABOUT DIFFERENT THINGS: NOT AT ALL
2. NOT BEING ABLE TO STOP OR CONTROL WORRYING: NOT AT ALL
6. BECOMING EASILY ANNOYED OR IRRITABLE: NOT AT ALL
4. TROUBLE RELAXING: SEVERAL DAYS

## 2023-02-13 ASSESSMENT — ANXIETY QUESTIONNAIRES: GAD7 TOTAL SCORE: 2

## 2023-03-06 ENCOUNTER — VIRTUAL VISIT (OUTPATIENT)
Dept: FAMILY MEDICINE | Facility: CLINIC | Age: 48
End: 2023-03-06
Payer: COMMERCIAL

## 2023-03-06 DIAGNOSIS — F41.1 GAD (GENERALIZED ANXIETY DISORDER): Primary | ICD-10-CM

## 2023-03-06 DIAGNOSIS — N95.1 PERIMENOPAUSE: ICD-10-CM

## 2023-03-06 DIAGNOSIS — F41.8 SITUATIONAL ANXIETY: ICD-10-CM

## 2023-03-06 PROCEDURE — 99214 OFFICE O/P EST MOD 30 MIN: CPT | Mod: VID | Performed by: NURSE PRACTITIONER

## 2023-03-06 RX ORDER — BUPROPION HYDROCHLORIDE 150 MG/1
150 TABLET ORAL EVERY MORNING
Qty: 90 TABLET | Refills: 3 | Status: SHIPPED | OUTPATIENT
Start: 2023-03-06 | End: 2024-04-15

## 2023-03-06 RX ORDER — SERTRALINE HYDROCHLORIDE 100 MG/1
50 TABLET, FILM COATED ORAL DAILY
Qty: 90 TABLET | Refills: 3 | COMMUNITY
Start: 2023-03-06 | End: 2023-12-24

## 2023-03-06 NOTE — PROGRESS NOTES
Answers for HPI/ROS submitted by the patient on 2/13/2023  How many servings of fruits and vegetables do you eat daily?: 4 or more  On average, how many sweetened beverages do you drink each day (Examples: soda, juice, sweet tea, etc.  Do NOT count diet or artificially sweetened beverages)?: 0  How many minutes a day do you exercise enough to make your heart beat faster?: 60 or more  How many days a week do you exercise enough to make your heart beat faster?: 3 or less  How many days per week do you miss taking your medication?: 0  What is the reason for your visit today? : follow up on medication change    Aditi is a 47 year old who is being evaluated via a billable video visit.      How would you like to obtain your AVS? MyChart  If the video visit is dropped, the invitation should be resent by: Text to cell phone: [phone]  Will anyone else be joining your video visit? No        Assessment & Plan   Problem List Items Addressed This Visit    None  Visit Diagnoses     TRE (generalized anxiety disorder)    -  Primary    Relevant Medications    buPROPion (WELLBUTRIN XL) 150 MG 24 hr tablet    sertraline (ZOLOFT) 100 MG tablet    Situational anxiety        Relevant Medications    buPROPion (WELLBUTRIN XL) 150 MG 24 hr tablet    sertraline (ZOLOFT) 100 MG tablet    Perimenopause             Currently stable; discussed consider HRT in the future. NO change to current medications. follow up 3 months    30 minutes spent on the date of the encounter doing chart review, history and exam, documentation and further activities per the note       See Patient Instructions    No follow-ups on file.    NITIN Mendez [facility]    Subjective   Aditi is a 47 year old, presenting for the following health issues:  No chief complaint on file.      HPI       Anxiety Follow-Up    How are you doing with your anxiety since your last visit? No change    Are you having other symptoms that  might be associated with anxiety? Yes:  some difficulty sleeping    Have you had a significant life event? Job Concerns     Are you feeling depressed? No    Do you have any concerns with your use of alcohol or other drugs? No     Has noticed that libido is somewhat improved. She feels that overall mood is stable. Has noticed a bit of increased anxiety around work. She continues to not get enough sleep, generally due to going to bed too late. Feels that medication for sleep works well, feels fine in the morning when she wakes up. Has noticed since October (4 months) that mood is slightly more labile than previously, and wonders if this is due to perimenopause. She also deals with hot flashes a few times per month, and some vaginal dryness.     Social History     Tobacco Use     Smoking status: Never     Smokeless tobacco: Never   Substance Use Topics     Alcohol use: Yes     Drug use: No     TRE-7 SCORE 11/13/2019 2/6/2023 2/6/2023   Total Score - - 2 (minimal anxiety)   Total Score 14 2 2     PHQ 11/13/2019   PHQ-9 Total Score 13   Q9: Thoughts of better off dead/self-harm past 2 weeks Not at all           Review of Systems   Constitutional, HEENT, cardiovascular, pulmonary, gi and gu systems are negative, except as otherwise noted.      Objective           Vitals:  No vitals were obtained today due to virtual visit.    Physical Exam   GENERAL: Healthy, alert and no distress  EYES: Eyes grossly normal to inspection.  No discharge or erythema, or obvious scleral/conjunctival abnormalities.  RESP: No audible wheeze, cough, or visible cyanosis.  No visible retractions or increased work of breathing.    SKIN: Visible skin clear. No significant rash, abnormal pigmentation or lesions.  NEURO: Cranial nerves grossly intact.  Mentation and speech appropriate for age.  PSYCH: Mentation appears normal, affect normal/bright, judgement and insight intact, normal speech and appearance well-groomed.    Lab Requisition on  01/12/2023   Component Date Value Ref Range Status     SARS CoV2 PCR 01/12/2023 Negative  Negative Final    NEGATIVE: SARS-CoV-2 (COVID-19) RNA not detected, presumed negative.               Video-Visit Details    Type of service:  Video Visit     Originating Location (pt. Location): Home  Distant Location (provider location):  On-site  Platform used for Video Visit: Christopher

## 2023-03-07 ENCOUNTER — TELEPHONE (OUTPATIENT)
Dept: GASTROENTEROLOGY | Facility: CLINIC | Age: 48
End: 2023-03-07

## 2023-03-07 DIAGNOSIS — Z80.0 FAMILY HISTORY OF COLON CANCER: Primary | ICD-10-CM

## 2023-03-07 RX ORDER — BISACODYL 5 MG/1
TABLET, DELAYED RELEASE ORAL
Qty: 4 TABLET | Refills: 0 | Status: SHIPPED | OUTPATIENT
Start: 2023-03-07 | End: 2023-12-21

## 2023-03-07 NOTE — TELEPHONE ENCOUNTER
Attempted to contact patient regarding upcoming Colonoscopy  procedure on 3.21.23 for pre assessment questions. No answer.     Left message to return call to [phone] #4    Discuss Covid policy and designated  policy.    Pre op exam? N/A    Arrival time: 0900. Procedure time: 1000    [facility]; [address] [place] [address], [address] Floor, [place] [address]    Sedation type: Conscious sedation     Anticoagulants: No    Electronic implanted devices? No    Diabetic? No    Indication for procedure: Family hx colon CA    Bowel prep recommendation: Standard Golytely      Prep instructions sent via [employer]. Bowel prep script sent to      [facility] - [address] [facility]      Val Hinton RN  Endoscopy Procedure Pre Assessment RN

## 2023-03-14 ENCOUNTER — TELEPHONE (OUTPATIENT)
Dept: GASTROENTEROLOGY | Facility: CLINIC | Age: 48
End: 2023-03-14

## 2023-03-14 NOTE — TELEPHONE ENCOUNTER
Second attempt for pre-assessment prior to upcoming colonoscopy     No answer.  Left message to return call [phone] #4    Farzana Dahl RN  Endoscopy Procedure Pre Assessment RN

## 2023-03-14 NOTE — TELEPHONE ENCOUNTER
Pre assessment questions completed for upcoming Colonoscopy  procedure scheduled on 03.21.2023    COVID policy reviewed.     Pre-op exam? N/A    Reviewed procedural arrival time 0900, procedure time 1000 and facility location [facility]; [address] [place] [address], 5[address] Floor, [place] [address]    Designated  policy reviewed. Instructed to have someone stay 6 hours post procedure.     Anticoagulation/blood thinners? No    Electronic implanted devices? No    Diabetic? No    Procedure indication: family hx of colon CA    Bowel prep recommendation: Standard Golytely     Reviewed procedure prep instructions.     Prep instructions sent via [employer].  Bowel prep script sent to      [facility] [address] [address].     Patient verbalized understanding and had no questions or concerns at this time.    Laura Henry RN  Endoscopy Procedure Pre Assessment RN

## 2023-03-14 NOTE — TELEPHONE ENCOUNTER
Caller: dAiti    Reason for Reschedule/Cancellation (please be detailed, any staff messages or encounters to note?): Cannot make date      Prior to reschedule please review:    Ordering Provider:Harmony Major per order:CS    Does patient have any ASC Exclusions, please identify?: n      Notes on Cancelled Procedure:    Procedure:Lower Endoscopy [Colonoscopy]     Date: 3/21    Location:[facility]; [address] [place] [address], [address] [place] [address]    Surgeon: Jim        Rescheduled: Yes    Procedure: Lower Endoscopy [Colonoscopy]     Date: 5/12    Location:[facility]; [address] [place] [address], [address] [place] [address]    Surgeon: Susana    Sedation Level Scheduled  CS,  Reason for Sedation Level order    Prep/Instructions updated and sent:                       
clear

## 2023-04-07 DIAGNOSIS — Z91.09 ENVIRONMENTAL ALLERGIES: ICD-10-CM

## 2023-04-13 ENCOUNTER — TELEPHONE (OUTPATIENT)
Dept: GASTROENTEROLOGY | Facility: CLINIC | Age: 48
End: 2023-04-13
Payer: COMMERCIAL

## 2023-04-13 NOTE — TELEPHONE ENCOUNTER
Caller: Writer to patient   Reason for Reschedule/Cancellation (please be detailed, any staff messages or encounters to note?): Susana out      Prior to reschedule please review:    Ordering Provider:Rebekah Antunez APRN CNP    Sedation per order:Moderate    Does patient have any ASC Exclusions, please identify?: No      Notes on Cancelled Procedure:    Procedure:Lower Endoscopy [Colonoscopy]     Date: 5/12/2023    Location:[facility]; [address] [place] [address], 5th Floor, [place] [address]    Surgeon: Susana        Rescheduled: No , BLANCA and sent MyChart to call back.     CASE IN DEPOT

## 2023-04-14 RX ORDER — FLUTICASONE PROPIONATE 50 MCG
SPRAY, SUSPENSION (ML) NASAL
Qty: 16 G | Refills: 1 | Status: SHIPPED | OUTPATIENT
Start: 2023-04-14 | End: 2023-06-05

## 2023-04-14 RX ORDER — PSEUDOEPHEDRINE HCL 30 MG
TABLET ORAL
Qty: 48 TABLET | Refills: 0 | Status: SHIPPED | OUTPATIENT
Start: 2023-04-14 | End: 2023-06-05

## 2023-04-17 NOTE — TELEPHONE ENCOUNTER
Patient stating they are returning call to pre assessment.     Writer unable to see a call out but it does appear that procedure was rescheduled.     Pre assessment questions completed for upcoming Colonoscopy  procedure scheduled on 5/12/23    COVID policy reviewed.     Pre-op exam? N/A    Reviewed procedural arrival time 1300, procedure time 1400 and facility location [facility]; [address] [facility], [address], [place] [address]    Designated  policy reviewed. Instructed to have someone stay 6 hours post procedure.     Bowel prep recommendation: Standard Golytely  - patient states they had picked this up from pharmacy already.     Reviewed procedure prep instructions.     Prep instructions sent via [employer].      Patient verbalized understanding and had no questions or concerns at this time.    Laura Salinas RN  Endoscopy Procedure Pre Assessment RN

## 2023-04-26 ENCOUNTER — TELEPHONE (OUTPATIENT)
Dept: GASTROENTEROLOGY | Facility: CLINIC | Age: 48
End: 2023-04-26
Payer: COMMERCIAL

## 2023-04-26 NOTE — TELEPHONE ENCOUNTER
Caller: Aditi Osuna    Reason for Reschedule/Cancellation (please be detailed, any staff messages or encounters to note?): Per Elvie Reinoso requested for adjustment in cases to shift a complicated case to last appt of the day. Lvm for Aditi to call back and confirm if okay to move up procedure from 1pm check in to 1215pm.      Prior to reschedule please review:    Ordering Provider:Rebekah Antunez APRN CNP    Sedation per order:MODERATE    Does patient have any ASC Exclusions, please identify?: NO      Notes on Cancelled Procedure:    Procedure:Lower Endoscopy [Colonoscopy]     Date: 5/12    Location[place]; [address] [facility], [address] [place] [address]    Surgeon: ELVIE        Rescheduled: Yes    Procedure: Lower Endoscopy [Colonoscopy]     Date: 5/12    Location:[facility]; [address] [facility], [address] [place] [address]    Surgeon: ELVIE    Sedation Level Scheduled  MODERATE,  Reason for Sedation Level PER ORDER    Prep/Instructions updated and sent: DYLAN

## 2023-05-12 ENCOUNTER — HOSPITAL ENCOUNTER (OUTPATIENT)
Facility: CLINIC | Age: 48
Discharge: HOME | End: 2023-05-12
Attending: INTERNAL MEDICINE | Admitting: INTERNAL MEDICINE
Payer: COMMERCIAL

## 2023-05-12 VITALS
RESPIRATION RATE: 16 BRPM | DIASTOLIC BLOOD PRESSURE: 74 MMHG | HEART RATE: 64 BPM | SYSTOLIC BLOOD PRESSURE: 127 MMHG | OXYGEN SATURATION: 99 %

## 2023-05-12 LAB — COLONOSCOPY: NORMAL

## 2023-05-12 PROCEDURE — 99153 MOD SED SAME PHYS/QHP EA: CPT | Performed by: INTERNAL MEDICINE

## 2023-05-12 PROCEDURE — 88305 TISSUE EXAM BY PATHOLOGIST: CPT | Mod: TC | Performed by: INTERNAL MEDICINE

## 2023-05-12 PROCEDURE — 250N000011 HC RX IP 250 OP 636: Performed by: INTERNAL MEDICINE

## 2023-05-12 PROCEDURE — 45380 COLONOSCOPY AND BIOPSY: CPT | Performed by: INTERNAL MEDICINE

## 2023-05-12 PROCEDURE — G0500 MOD SEDAT ENDO SERVICE >5YRS: HCPCS | Mod: PT | Performed by: INTERNAL MEDICINE

## 2023-05-12 PROCEDURE — 88305 TISSUE EXAM BY PATHOLOGIST: CPT | Mod: 26 | Performed by: PATHOLOGY

## 2023-05-12 RX ORDER — NALOXONE HYDROCHLORIDE 0.4 MG/ML
0.2 INJECTION, SOLUTION INTRAMUSCULAR; INTRAVENOUS; SUBCUTANEOUS
Status: DISCONTINUED | OUTPATIENT
Start: 2023-05-12 | End: 2023-05-12 | Stop reason: HOSPADM

## 2023-05-12 RX ORDER — NALOXONE HYDROCHLORIDE 0.4 MG/ML
0.4 INJECTION, SOLUTION INTRAMUSCULAR; INTRAVENOUS; SUBCUTANEOUS
Status: DISCONTINUED | OUTPATIENT
Start: 2023-05-12 | End: 2023-05-12 | Stop reason: HOSPADM

## 2023-05-12 RX ORDER — FENTANYL CITRATE 50 UG/ML
INJECTION, SOLUTION INTRAMUSCULAR; INTRAVENOUS PRN
Status: DISCONTINUED | OUTPATIENT
Start: 2023-05-12 | End: 2023-05-12 | Stop reason: HOSPADM

## 2023-05-12 RX ORDER — ONDANSETRON 2 MG/ML
4 INJECTION INTRAMUSCULAR; INTRAVENOUS
Status: DISCONTINUED | OUTPATIENT
Start: 2023-05-12 | End: 2023-05-12 | Stop reason: HOSPADM

## 2023-05-12 RX ORDER — FLUMAZENIL 0.1 MG/ML
0.2 INJECTION, SOLUTION INTRAVENOUS
Status: DISCONTINUED | OUTPATIENT
Start: 2023-05-12 | End: 2023-05-12 | Stop reason: HOSPADM

## 2023-05-12 RX ORDER — PROCHLORPERAZINE MALEATE 5 MG
10 TABLET ORAL EVERY 6 HOURS PRN
Status: DISCONTINUED | OUTPATIENT
Start: 2023-05-12 | End: 2023-05-12 | Stop reason: HOSPADM

## 2023-05-12 RX ORDER — ONDANSETRON 2 MG/ML
4 INJECTION INTRAMUSCULAR; INTRAVENOUS EVERY 6 HOURS PRN
Status: DISCONTINUED | OUTPATIENT
Start: 2023-05-12 | End: 2023-05-12 | Stop reason: HOSPADM

## 2023-05-12 RX ORDER — LIDOCAINE 40 MG/G
CREAM TOPICAL
Status: DISCONTINUED | OUTPATIENT
Start: 2023-05-12 | End: 2023-05-12 | Stop reason: HOSPADM

## 2023-05-12 RX ORDER — ONDANSETRON 4 MG/1
4 TABLET, ORALLY DISINTEGRATING ORAL EVERY 6 HOURS PRN
Status: DISCONTINUED | OUTPATIENT
Start: 2023-05-12 | End: 2023-05-12 | Stop reason: HOSPADM

## 2023-05-12 RX ORDER — DIPHENHYDRAMINE HYDROCHLORIDE 50 MG/ML
INJECTION INTRAMUSCULAR; INTRAVENOUS PRN
Status: DISCONTINUED | OUTPATIENT
Start: 2023-05-12 | End: 2023-05-12 | Stop reason: HOSPADM

## 2023-05-12 ASSESSMENT — ACTIVITIES OF DAILY LIVING (ADL): ADLS_ACUITY_SCORE: 35

## 2023-05-12 NOTE — OR NURSING
Pt underwent colonoscopy with polypectomy under conscious sedation. Specimens: sent to lab. Pt transferred to recovery and report given to danette ESQUIVEL.       Eve Bernabe RN

## 2023-06-05 ENCOUNTER — VIRTUAL VISIT (OUTPATIENT)
Dept: FAMILY MEDICINE | Facility: CLINIC | Age: 48
End: 2023-06-05
Payer: COMMERCIAL

## 2023-06-05 ENCOUNTER — MYC MEDICAL ADVICE (OUTPATIENT)
Dept: FAMILY MEDICINE | Facility: CLINIC | Age: 48
End: 2023-06-05

## 2023-06-05 DIAGNOSIS — F41.1 GAD (GENERALIZED ANXIETY DISORDER): Primary | ICD-10-CM

## 2023-06-05 DIAGNOSIS — Z91.09 ENVIRONMENTAL ALLERGIES: ICD-10-CM

## 2023-06-05 DIAGNOSIS — N95.1 PERIMENOPAUSE: ICD-10-CM

## 2023-06-05 DIAGNOSIS — G47.00 INSOMNIA, UNSPECIFIED TYPE: ICD-10-CM

## 2023-06-05 PROCEDURE — 99213 OFFICE O/P EST LOW 20 MIN: CPT | Mod: VID | Performed by: NURSE PRACTITIONER

## 2023-06-05 RX ORDER — AZELASTINE 1 MG/ML
1 SPRAY, METERED NASAL AT BEDTIME
Qty: 90 ML | Refills: 11 | Status: SHIPPED | OUTPATIENT
Start: 2023-06-05 | End: 2024-06-27

## 2023-06-05 RX ORDER — PSEUDOEPHEDRINE HCL 30 MG
30 TABLET ORAL DAILY
Qty: 48 TABLET | Refills: 3 | Status: SHIPPED | OUTPATIENT
Start: 2023-06-05 | End: 2024-02-12

## 2023-06-05 RX ORDER — TRAZODONE HYDROCHLORIDE 50 MG/1
50-100 TABLET, FILM COATED ORAL AT BEDTIME
Qty: 180 TABLET | Refills: 0 | Status: SHIPPED | OUTPATIENT
Start: 2023-06-05 | End: 2023-12-21

## 2023-06-05 RX ORDER — FLUTICASONE PROPIONATE 50 MCG
2 SPRAY, SUSPENSION (ML) NASAL DAILY
Qty: 16 G | Refills: 3 | Status: SHIPPED | OUTPATIENT
Start: 2023-06-05 | End: 2023-10-24

## 2023-06-05 NOTE — PROGRESS NOTES
Aditi is a 47 year old who is being evaluated via a billable video visit.      How would you like to obtain your AVS? MyChart  If the video visit is dropped, the invitation should be resent by: Text to cell phone: [phone]  Will anyone else be joining your video visit? No        Assessment & Plan   Problem List Items Addressed This Visit     Insomnia, unspecified type    Relevant Medications    traZODone (DESYREL) 50 MG tablet   Other Visit Diagnoses     TRE (generalized anxiety disorder)    -  Primary    Relevant Medications    traZODone (DESYREL) 50 MG tablet    Environmental allergies        Relevant Medications    azelastine (ASTELIN) 0.1 % nasal spray    fluticasone (FLONASE) 50 MCG/ACT nasal spray    pseudoePHEDrine (SM NASAL DECONGESTANT MAX ST) 30 MG tablet    Perimenopause             Mood symptoms stable    30 minutes spent by me on the date of the encounter doing review of test results and patient visit        See Patient Instructions    NITIN Mendez CNP  [facility]   Aditi is a 47 year old, presenting for the following health issues:  Recheck Medication        6/5/2023     9:29 AM   Additional Questions   Roomed by Lina Mcmahon     History of Present Illness       Reason for visit:  Med check. Perimenopause discussion    She eats 4 or more servings of fruits and vegetables daily.She consumes 1 sweetened beverage(s) daily.She exercises with enough effort to increase her heart rate 10 to 19 minutes per day.  She exercises with enough effort to increase her heart rate 3 or less days per week.   She is taking medications regularly.       Anxiety Follow-Up    How are you doing with your anxiety since your last visit? Improved     Are you having other symptoms that might be associated with anxiety? No    Have you had a significant life event? OTHER: has been getting less exercise because work is very busy     Are you feeling depressed? No    Do you  "have any concerns with your use of alcohol or other drugs? No     Sleep symptoms stable    Period has been irregular; she still has an IUD. She feels like libido symptoms are improved.    Social History     Tobacco Use     Smoking status: Never     Smokeless tobacco: Never   Vaping Use     Vaping status: Never Used   Substance Use Topics     Alcohol use: Yes     Drug use: No         11/13/2019     5:00 PM 2/6/2023     9:30 AM   TRE-7 SCORE   Total Score  2 (minimal anxiety)   Total Score 14 2    2         11/13/2019     5:00 PM   PHQ   PHQ-9 Total Score 13   Q9: Thoughts of better off dead/self-harm past 2 weeks Not at all       Review of Systems   Constitutional, HEENT, cardiovascular, pulmonary, gi and gu systems are negative, except as otherwise noted.      Objective    Vitals - Patient Reported  Weight (Patient Reported): 72.6 kg (160 lb)  Height (Patient Reported): 170.2 cm (5' 7\")  BMI (Based on Pt Reported Ht/Wt): 25.06  Pain Score: No Pain (0)        Physical Exam   GENERAL: Healthy, alert and no distress  EYES: Eyes grossly normal to inspection.  No discharge or erythema, or obvious scleral/conjunctival abnormalities.  RESP: No audible wheeze, cough, or visible cyanosis.  No visible retractions or increased work of breathing.    SKIN: Visible skin clear. No significant rash, abnormal pigmentation or lesions.  NEURO: Cranial nerves grossly intact.  Mentation and speech appropriate for age.  PSYCH: Mentation appears normal, affect normal/bright, judgement and insight intact, normal speech and appearance well-groomed.          Video-Visit Details    Type of service:  Video Visit     Originating Location (pt. Location): Home  Distant Location (provider location):  On-site  Platform used for Video Visit: Christopher"

## 2023-06-05 NOTE — TELEPHONE ENCOUNTER
Updated pt's mychart record with immunizations per mycDmailer message.    Franky Oseguera RN   [facility]

## 2023-07-03 ENCOUNTER — THERAPY VISIT (OUTPATIENT)
Dept: PHYSICAL THERAPY | Facility: CLINIC | Age: 48
End: 2023-07-03
Payer: COMMERCIAL

## 2023-07-03 DIAGNOSIS — N39.46 MIXED STRESS AND URGE URINARY INCONTINENCE: Primary | ICD-10-CM

## 2023-07-03 PROCEDURE — 97530 THERAPEUTIC ACTIVITIES: CPT | Mod: GP | Performed by: PHYSICAL THERAPIST

## 2023-07-06 PROBLEM — N39.46 MIXED STRESS AND URGE URINARY INCONTINENCE: Status: RESOLVED | Noted: 2021-08-12 | Resolved: 2023-07-06

## 2023-07-07 NOTE — PROGRESS NOTES
DISCHARGE  Reason for Discharge: Patient has not made expected progress due to interrupted treatment attendance.  Pt seeking options to assist with KASSIE.    Equipment Issued: education in potential devices    Discharge Plan: Patient to continue home program.    Referring Provider:  Philly Gamez

## 2023-07-30 ENCOUNTER — MYC MEDICAL ADVICE (OUTPATIENT)
Dept: FAMILY MEDICINE | Facility: CLINIC | Age: 48
End: 2023-07-30
Payer: COMMERCIAL

## 2023-08-15 ENCOUNTER — ALLIED HEALTH/NURSE VISIT (OUTPATIENT)
Dept: FAMILY MEDICINE | Facility: CLINIC | Age: 48
End: 2023-08-15
Payer: COMMERCIAL

## 2023-08-15 DIAGNOSIS — Z23 ENCOUNTER FOR IMMUNIZATION: Primary | ICD-10-CM

## 2023-08-15 PROCEDURE — 99207 PR NO CHARGE NURSE ONLY: CPT

## 2023-08-15 PROCEDURE — 90750 HZV VACC RECOMBINANT IM: CPT

## 2023-08-15 PROCEDURE — 90471 IMMUNIZATION ADMIN: CPT

## 2023-08-15 NOTE — PROGRESS NOTES
Prior to immunization administration, verified patients identity using patient s name and date of birth. Please see Immunization Activity for additional information.     Screening Questionnaire for Adult Immunization    Are you sick today?   No   Do you have allergies to medications, food, a vaccine component or latex?   No   Have you ever had a serious reaction after receiving a vaccination?   No   Do you have a long-term health problem with heart, lung, kidney, or metabolic disease (e.g., diabetes), asthma, a blood disorder, no spleen, complement component deficiency, a cochlear implant, or a spinal fluid leak?  Are you on long-term aspirin therapy?   No   Do you have cancer, leukemia, HIV/AIDS, or any other immune system problem?   No   Do you have a parent, brother, or sister with an immune system problem?   No   In the past 3 months, have you taken medications that affect  your immune system, such as prednisone, other steroids, or anticancer drugs; drugs for the treatment of rheumatoid arthritis, Crohn s disease, or psoriasis; or have you had radiation treatments?   No   Have you had a seizure, or a brain or other nervous system problem?   No   During the past year, have you received a transfusion of blood or blood    products, or been given immune (gamma) globulin or antiviral drug?   No   For women: Are you pregnant or is there a chance you could become       pregnant during the next month?   No   Have you received any vaccinations in the past 4 weeks?   No     Immunization questionnaire answers were all negative.    I have reviewed the following standing orders:   This patient is due and qualifies for the Zoster vaccine.    Click here for Zoster Standing Order    I have reviewed the vaccines inclusion and exclusion criteria; No concerns regarding eligibility.     Patient instructed to remain in clinic for 15 minutes afterwards, and to report any adverse reactions.     Screening performed by ALICIA JONES RN  on 8/15/2023 at 2:44 PM.

## 2023-08-17 ENCOUNTER — OFFICE VISIT (OUTPATIENT)
Dept: DERMATOLOGY | Facility: CLINIC | Age: 48
End: 2023-08-17
Attending: NURSE PRACTITIONER
Payer: COMMERCIAL

## 2023-08-17 DIAGNOSIS — Z12.83 SKIN CANCER SCREENING: ICD-10-CM

## 2023-08-17 DIAGNOSIS — D18.01 CHERRY ANGIOMA: ICD-10-CM

## 2023-08-17 DIAGNOSIS — D22.9 MULTIPLE BENIGN NEVI: ICD-10-CM

## 2023-08-17 DIAGNOSIS — L82.1 SEBORRHEIC KERATOSIS: Primary | ICD-10-CM

## 2023-08-17 DIAGNOSIS — L91.8 INFLAMED SKIN TAG: ICD-10-CM

## 2023-08-17 DIAGNOSIS — L81.4 SOLAR LENTIGO: ICD-10-CM

## 2023-08-17 PROCEDURE — 11200 RMVL SKIN TAGS UP TO&INC 15: CPT | Performed by: DERMATOLOGY

## 2023-08-17 PROCEDURE — 99203 OFFICE O/P NEW LOW 30 MIN: CPT | Mod: 25 | Performed by: DERMATOLOGY

## 2023-08-17 ASSESSMENT — PAIN SCALES - GENERAL: PAINLEVEL: NO PAIN (0)

## 2023-08-17 NOTE — LETTER
8/17/2023       RE: Aditi Osuna  [address]  [facility] [address]     Dear Colleague,    Thank you for referring your patient, Aditi Osuna, to the [facility] [place] at [facility]. Please see a copy of my visit note below.    [facility] Dermatology Note  Encounter Date: Aug 17, 2023  Office Visit     Dermatology Problem List:  Irritated acrochordons   ____________________________________________    Assessment & Plan:    # Benign skin findings: solar lentigo, genital lentigo, SK, dermatofibroma, acrochordons   - ABCDEs: Counseled ABCDEs of melanoma: Asymmetry, Border (irregularity), Color (not uniform, changes in color), Diameter (greater than 6 mm which is about the size of a pencil eraser), and Evolving (any changes in preexisting moles).  - Sun protection: Counseled SPF30+ sunscreen, UPF clothing, sun avoidance, tanning bed avoidance.  - Skin checks q1-2 years or sooner for new or changing lesions given skin type    # Irritated acrochordons, L underarm x 2, R underarm x 1. Symptomatic.   - Cryotherapy today    Procedures Performed:   - Cryotherapy procedure note, location(s): L underarm x 2 and R underarm x 1. After verbal consent and discussion of risks and benefits including, but not limited to, dyspigmentation/scar, blister, and pain, 3 lesion(s) was(were) treated with 1-2 mm freeze border for 1-2 cycles with liquid nitrogen. Post cryotherapy instructions were provided.  - Procedure(s) performed by faculty.     Follow-up: 1-2 years or sooner if new or changing lesions    Staff and Medical Student:     Leatha Tejada, MS3    Staff Physician:  I was present with the medical student who participated in the service and in the documentation of the note. I have verified the history and personally performed the physical exam and medical decision making. I agree with the assessment and plan of care as  documented in the note. The procedure(s) was(were) performed by myself.    Gurinder Fuchs MD  Pronouns: he/him/his    Department of Dermatology  [facility]: Phone: [phone], Fax:[phone]  [facility]: Phone: [phone] Fax: [phone]  ____________________________________________    CC: Skin Check (No areas of concern)    HPI:  Ms. Aditi Osuna is a(n) 48 year old female who presents today as a new patient for FBSE.    She reports no family or personal history of melanoma. Her dad had a BCC. She has a history of tanning bed use intermittently for vacations about 20 years ago. She uses sunscreen consistently on the face. She does not report any occupational exposure to UV or radiation. She usually burns when out in the sun.    Patient reports that she has skin tags under both armpits that are itchy and irritated.     Patient is otherwise feeling well, without additional skin concerns.    Labs:  None reviewed.    Physical Exam:  Vitals: There were no vitals taken for this visit.  SKIN: Full skin, which includes the head/face, both arms, chest, back, abdomen,both legs, genitalia and/or groin buttocks, digits and/or nails, was examined.  - tan papules on labia majora  - tan papules on trunk and extremities in sun exposed areas  - pink papule on L inner thigh with positive dimple sign  - stuck on, waxy papule on R upper back  - pedunculated papule in L underarm x 2 and R underarm x 1  - No other lesions of concern on areas examined.     Medications:  Current Outpatient Medications   Medication    azelastine (ASTELIN) 0.1 % nasal spray    azelastine (ASTELIN) 0.1 % nasal spray    bisacodyl (DULCOLAX) 5 MG EC tablet    buPROPion (WELLBUTRIN XL) 150 MG 24 hr tablet    cetirizine HCl 10 MG CAPS    fluticasone (FLONASE) 50 MCG/ACT nasal spray    fluticasone (FLONASE) 50 MCG/ACT nasal  spray    glucosamine-chondroitin 500-400 MG CAPS    hydrOXYzine (VISTARIL) 50 MG capsule    multivitamin w/minerals (THERA-VIT-M) tablet    Omega-3 Fatty Acids (OMEGA-3 FISH OIL PO)    polyethylene glycol (GOLYTELY) 236 g suspension    pseudoePHEDrine (SM NASAL DECONGESTANT MAX ST) 30 MG tablet    sertraline (ZOLOFT) 100 MG tablet    traZODone (DESYREL) 50 MG tablet    VITAMIN D, CHOLECALCIFEROL, PO     No current facility-administered medications for this visit.      Past Medical History:   Patient Active Problem List   Diagnosis    IUD (intrauterine device) in place    Insomnia, unspecified type    Screening for cervical cancer     Past Medical History:   Diagnosis Date    Anxiety         CC Rebekah Antunez, APRN CNP  [address]  [place] [address] on close of this encounter.

## 2023-08-17 NOTE — PROGRESS NOTES
[facility] Dermatology Note  Encounter Date: Aug 17, 2023  Office Visit     Dermatology Problem List:  1. Irritated acrochordons   ____________________________________________    Assessment & Plan:    # Benign skin findings: solar lentigo, genital lentigo, SK, dermatofibroma, acrochordons   - ABCDEs: Counseled ABCDEs of melanoma: Asymmetry, Border (irregularity), Color (not uniform, changes in color), Diameter (greater than 6 mm which is about the size of a pencil eraser), and Evolving (any changes in preexisting moles).  - Sun protection: Counseled SPF30+ sunscreen, UPF clothing, sun avoidance, tanning bed avoidance.  - Skin checks q1-2 years or sooner for new or changing lesions given skin type    # Irritated acrochordons, L underarm x 2, R underarm x 1. Symptomatic.   - Cryotherapy today    Procedures Performed:   - Cryotherapy procedure note, location(s): L underarm x 2 and R underarm x 1. After verbal consent and discussion of risks and benefits including, but not limited to, dyspigmentation/scar, blister, and pain, 3 lesion(s) was(were) treated with 1-2 mm freeze border for 1-2 cycles with liquid nitrogen. Post cryotherapy instructions were provided.  - Procedure(s) performed by faculty.     Follow-up: 1-2 years or sooner if new or changing lesions    Staff and Medical Student:     Leatha Tejada, MS3    Staff Physician:  I was present with the medical student who participated in the service and in the documentation of the note. I have verified the history and personally performed the physical exam and medical decision making. I agree with the assessment and plan of care as documented in the note. The procedure(s) was(were) performed by myself.    Gurinder Fuchs MD  Pronouns: he/him/his    Department of Dermatology  [facility]: Phone: [phone], Fax:[phone]  [facility]: Phone: [phone] Fax: [phone]  ____________________________________________    CC: Skin Check (No areas of concern)    HPI:  Ms. Aditi Osuna is a(n) 48 year old female who presents today as a new patient for FBSE.    She reports no family or personal history of melanoma. Her dad had a BCC. She has a history of tanning bed use intermittently for vacations about 20 years ago. She uses sunscreen consistently on the face. She does not report any occupational exposure to UV or radiation. She usually burns when out in the sun.    Patient reports that she has skin tags under both armpits that are itchy and irritated.     Patient is otherwise feeling well, without additional skin concerns.    Labs:  None reviewed.    Physical Exam:  Vitals: There were no vitals taken for this visit.  SKIN: Full skin, which includes the head/face, both arms, chest, back, abdomen,both legs, genitalia and/or groin buttocks, digits and/or nails, was examined.  - tan papules on labia majora  - tan papules on trunk and extremities in sun exposed areas  - pink papule on L inner thigh with positive dimple sign  - stuck on, waxy papule on R upper back  - pedunculated papule in L underarm x 2 and R underarm x 1  - No other lesions of concern on areas examined.     Medications:  Current Outpatient Medications   Medication     azelastine (ASTELIN) 0.1 % nasal spray     azelastine (ASTELIN) 0.1 % nasal spray     bisacodyl (DULCOLAX) 5 MG EC tablet     buPROPion (WELLBUTRIN XL) 150 MG 24 hr tablet     cetirizine HCl 10 MG CAPS     fluticasone (FLONASE) 50 MCG/ACT nasal spray     fluticasone (FLONASE) 50 MCG/ACT nasal spray     glucosamine-chondroitin 500-400 MG CAPS     hydrOXYzine (VISTARIL) 50 MG capsule     multivitamin w/minerals (THERA-VIT-M) tablet     Omega-3 Fatty Acids (OMEGA-3 FISH OIL PO)     polyethylene glycol (GOLYTELY) 236 g suspension     pseudoePHEDrine (SM NASAL DECONGESTANT MAX ST) 30 MG tablet      sertraline (ZOLOFT) 100 MG tablet     traZODone (DESYREL) 50 MG tablet     VITAMIN D, CHOLECALCIFEROL, PO     No current facility-administered medications for this visit.      Past Medical History:   Patient Active Problem List   Diagnosis     IUD (intrauterine device) in place     Insomnia, unspecified type     Screening for cervical cancer     Past Medical History:   Diagnosis Date     Anxiety         CC Rebekah Antunez, APRN CNP  [address] [facility] [address]  [place] [address] on close of this encounter.

## 2023-08-17 NOTE — NURSING NOTE
Chief Complaint   Patient presents with    Skin Check     No areas of concern     Feed Purcell, EMT

## 2023-10-12 ENCOUNTER — IMMUNIZATION (OUTPATIENT)
Dept: NURSING | Facility: CLINIC | Age: 48
End: 2023-10-12
Payer: COMMERCIAL

## 2023-10-12 PROCEDURE — 90480 ADMN SARSCOV2 VAC 1/ONLY CMP: CPT

## 2023-10-12 PROCEDURE — 91320 SARSCV2 VAC 30MCG TRS-SUC IM: CPT

## 2023-10-23 DIAGNOSIS — Z91.09 ENVIRONMENTAL ALLERGIES: ICD-10-CM

## 2023-10-24 RX ORDER — FLUTICASONE PROPIONATE 50 MCG
SPRAY, SUSPENSION (ML) NASAL
Qty: 16 G | Refills: 3 | Status: SHIPPED | OUTPATIENT
Start: 2023-10-24 | End: 2024-04-19

## 2023-10-28 ENCOUNTER — E-VISIT (OUTPATIENT)
Dept: URGENT CARE | Facility: CLINIC | Age: 48
End: 2023-10-28
Payer: COMMERCIAL

## 2023-10-28 DIAGNOSIS — L73.9 FOLLICULITIS: Primary | ICD-10-CM

## 2023-10-28 PROCEDURE — 99421 OL DIG E/M SVC 5-10 MIN: CPT | Performed by: PHYSICIAN ASSISTANT

## 2023-10-29 NOTE — PATIENT INSTRUCTIONS
Dear Aditi Osuna    Lets try some clindamycin lotion, treating follicultiis,assuming all the spots look similar to the one you showed me.  I have sent it in for you.  Hope ths helps!    Thanks for choosing us as your health care partner,    Jeannette Acosta, PA-C, PA-C

## 2023-10-30 ENCOUNTER — LAB REQUISITION (OUTPATIENT)
Dept: LAB | Facility: CLINIC | Age: 48
End: 2023-10-30

## 2023-10-30 LAB — SARS-COV-2 RNA RESP QL NAA+PROBE: NEGATIVE

## 2023-10-30 PROCEDURE — 87635 SARS-COV-2 COVID-19 AMP PRB: CPT | Performed by: INTERNAL MEDICINE

## 2023-11-07 ENCOUNTER — PATIENT OUTREACH (OUTPATIENT)
Dept: CARE COORDINATION | Facility: CLINIC | Age: 48
End: 2023-11-07
Payer: COMMERCIAL

## 2023-11-29 ENCOUNTER — ANCILLARY PROCEDURE (OUTPATIENT)
Dept: MAMMOGRAPHY | Facility: CLINIC | Age: 48
End: 2023-11-29
Attending: NURSE PRACTITIONER
Payer: COMMERCIAL

## 2023-11-29 ENCOUNTER — E-VISIT (OUTPATIENT)
Dept: URGENT CARE | Facility: CLINIC | Age: 48
End: 2023-11-29
Payer: COMMERCIAL

## 2023-11-29 DIAGNOSIS — J06.9 VIRAL URI WITH COUGH: Primary | ICD-10-CM

## 2023-11-29 DIAGNOSIS — Z12.31 VISIT FOR SCREENING MAMMOGRAM: ICD-10-CM

## 2023-11-29 PROCEDURE — 77063 BREAST TOMOSYNTHESIS BI: CPT | Mod: 26 | Performed by: RADIOLOGY

## 2023-11-29 PROCEDURE — 77067 SCR MAMMO BI INCL CAD: CPT | Mod: 26 | Performed by: RADIOLOGY

## 2023-11-29 PROCEDURE — 99421 OL DIG E/M SVC 5-10 MIN: CPT | Performed by: NURSE PRACTITIONER

## 2023-11-29 PROCEDURE — 77067 SCR MAMMO BI INCL CAD: CPT

## 2023-11-29 RX ORDER — GUAIFENESIN 1200 MG/1
1200 TABLET, EXTENDED RELEASE ORAL 2 TIMES DAILY
Qty: 60 TABLET | Refills: 0 | Status: SHIPPED | OUTPATIENT
Start: 2023-11-29 | End: 2024-08-06

## 2023-11-30 NOTE — PATIENT INSTRUCTIONS
Viral Respiratory Infection: Care Instructions  Overview     A viral respiratory infection is an infection of the nose, sinuses, or throat caused by a virus. Colds and the flu are common types of viral respiratory infections.  The symptoms of a viral respiratory infection often start quickly. They include a fever, sore throat, and runny nose. You may also just not feel well. Or you may not want to eat much.  Most viral infections can be treated with home care. This may include drinking lots of fluids and taking over-the-counter pain medicine. You will probably feel better in 4 to 10 days.  Antibiotics are not used to treat a viral infection. Antibiotics don't kill viruses, so they won't help cure a viral illness.  In some cases, a doctor may prescribe antiviral medicine to help your body fight a serious viral infection.  Follow-up care is a key part of your treatment and safety. Be sure to make and go to all appointments, and call your doctor if you are having problems. It's also a good idea to know your test results and keep a list of the medicines you take.  How can you care for yourself at home?  To prevent dehydration, drink plenty of fluids. Choose water and other clear liquids until you feel better. If you have kidney, heart, or liver disease and have to limit fluids, talk with your doctor before you increase the amount of fluids you drink.  Ask your doctor if you can take an over-the-counter pain medicine, such as acetaminophen (Tylenol), ibuprofen (Advil, Motrin), or naproxen (Aleve). Be safe with medicines. Read and follow all instructions on the label. No one younger than 20 should take aspirin. It has been linked to Reye syndrome, a serious illness.  Be careful when taking over-the-counter cold or flu medicines and Tylenol at the same time. Many of these medicines have acetaminophen, which is Tylenol. Read the labels to make sure that you are not taking more than the recommended dose. Too much  "acetaminophen (Tylenol) can be harmful.  Get plenty of rest.  Use saline (saltwater) nasal washes to help keep your nasal passages open and wash out mucus and allergens. You can buy saline nose sprays at a grocery store or drugstore. Follow the instructions on the package. Or you can make your own at home. Add 1 teaspoon of non-iodized salt and 1 teaspoon of baking soda to 2 cups of distilled or boiled and cooled water. Fill a squeeze bottle or neti pot with the nasal wash. Then put the tip into your nostril, and lean over the sink. With your mouth open, gently squirt the liquid. Repeat on the other side.  Use a vaporizer or humidifier to add moisture to your bedroom. Follow the instructions for cleaning the machine.  Do not smoke or allow others to smoke around you. If you need help quitting, talk to your doctor about stop-smoking programs and medicines. These can increase your chances of quitting for good.  When should you call for help?   Call 911 anytime you think you may need emergency care. For example, call if:    You have severe trouble breathing.   Call your doctor now or seek immediate medical care if:    You have a new or higher fever.     Your fever lasts more than 48 hours.     You have trouble breathing.     You have a fever with a stiff neck or a severe headache.     You are sensitive to light.     You feel very sleepy or confused.   Watch closely for changes in your health, and be sure to contact your doctor if:    You do not get better as expected.   Where can you learn more?  Go to https://www.[employer].net/patiented  Enter Q795 in the search box to learn more about \"Viral Respiratory Infection: Care Instructions.\"  Current as of: June 13, 2023               Content Version: 13.8    9354-4497 [employer] [facility].   Care instructions adapted under license by your healthcare professional. If you have questions about a medical condition or this instruction, always ask your healthcare " professional. [employer] disclaims any warranty or liability for your use of this information.      Thank you for choosing us for your care. I have placed an order for a prescription for Mucinex so that you can start treatment. View your full visit summary for details by clicking on the link below. Your pharmacist will able to address any questions you may have about the medication.     If you're not feeling better within 5-7 days, please schedule an appointment.  You can schedule an appointment right here in [facility], or call [phone]  If the visit is for the same symptoms as your eVisit, we'll refund the cost of your eVisit if seen within seven days.

## 2023-12-03 ENCOUNTER — E-VISIT (OUTPATIENT)
Dept: URGENT CARE | Facility: CLINIC | Age: 48
End: 2023-12-03
Payer: COMMERCIAL

## 2023-12-03 DIAGNOSIS — J01.90 ACUTE SINUSITIS WITH SYMPTOMS > 10 DAYS: Primary | ICD-10-CM

## 2023-12-03 PROCEDURE — 99207 PR NO CHARGE LOS: CPT | Performed by: PHYSICIAN ASSISTANT

## 2023-12-03 RX ORDER — DOXYCYCLINE HYCLATE 100 MG
100 TABLET ORAL 2 TIMES DAILY
Qty: 14 TABLET | Refills: 0 | Status: SHIPPED | OUTPATIENT
Start: 2023-12-03 | End: 2023-12-10

## 2023-12-04 NOTE — PATIENT INSTRUCTIONS
Acute Sinusitis: Care Instructions  Overview     Acute sinusitis is an inflammation of the mucous membranes inside the nose and sinuses. Sinuses are the hollow spaces in your skull around the eyes and nose. Acute sinusitis often follows a cold. Acute sinusitis causes thick, discolored mucus that drains from the nose or down the back of the throat. It also can cause pain and pressure in your head and face along with a stuffy or blocked nose.  In most cases, sinusitis gets better on its own in 1 to 2 weeks. But some mild symptoms may last for several weeks. Sometimes antibiotics are needed if there is a bacterial infection.  Follow-up care is a key part of your treatment and safety. Be sure to make and go to all appointments, and call your doctor if you are having problems. It's also a good idea to know your test results and keep a list of the medicines you take.  How can you care for yourself at home?  Use saline (saltwater) nasal washes. This can help keep your nasal passages open and wash out mucus and allergens.  You can buy saline nose washes at a grocery store or drugstore. Follow the instructions on the package.  You can make your own at home. Add 1 teaspoon of non-iodized salt and 1 teaspoon of baking soda to 2 cups of distilled or boiled and cooled water. Fill a squeeze bottle or a nasal cleansing pot (such as a neti pot) with the nasal wash. Then put the tip into your nostril, and lean over the sink. With your mouth open, gently squirt the liquid. Repeat on the other side.  Try a decongestant nasal spray like oxymetazoline (Afrin). Do not use it for more than 3 days in a row. Using it for more than 3 days can make your congestion worse.  If needed, take an over-the-counter pain medicine, such as acetaminophen (Tylenol), ibuprofen (Advil, Motrin), or naproxen (Aleve). Read and follow all instructions on the label.  If the doctor prescribed antibiotics, take them as directed. Do not stop taking them just  "because you feel better. You need to take the full course of antibiotics.  Be careful when taking over-the-counter cold or flu medicines and Tylenol at the same time. Many of these medicines have acetaminophen, which is Tylenol. Read the labels to make sure that you are not taking more than the recommended dose. Too much acetaminophen (Tylenol) can be harmful.  Try a steroid nasal spray. It may help with your symptoms.  Breathe warm, moist air. You can use a steamy shower, a hot bath, or a sink filled with hot water. Avoid cold, dry air. Using a humidifier in your home may help. Follow the directions for cleaning the machine.  When should you call for help?   Call your doctor now or seek immediate medical care if:    You have new or worse swelling, redness, or pain in your face or around one or both of your eyes.     You have double vision or a change in your vision.     You have a high fever.     You have a severe headache and a stiff neck.     You have mental changes, such as feeling confused or much less alert.   Watch closely for changes in your health, and be sure to contact your doctor if:    You are not getting better as expected.   Where can you learn more?  Go to https://www.[employer].net/patiented  Enter I933 in the search box to learn more about \"Acute Sinusitis: Care Instructions.\"  Current as of: February 28, 2023               Content Version: 13.8    1747-7436 [employer].   Care instructions adapted under license by your healthcare professional. If you have questions about a medical condition or this instruction, always ask your healthcare professional. [employer] disclaims any warranty or liability for your use of this information.      You may want to try a nasal lavage (also known as nasal irrigation). You can find over-the-counter products, such as Neti-Pot, at retail locations or make your own at home. Instructions for homemade nasal lavage and more information on the " process are available online at http://www.aafp.org/afp/2009/1115/p1121.html.    Dear Aditi Osuna    After reviewing your responses, I've been able to diagnose you with Acute sinusitis with symptoms > 10 days.      Based on your responses and diagnosis, I have prescribed   Orders Placed This Encounter   Medications     doxycycline hyclate (VIBRA-TABS) 100 MG tablet     Sig: Take 1 tablet (100 mg) by mouth 2 times daily for 7 days     Dispense:  14 tablet     Refill:  0     May substitute any formulation of 100mg doxycycline available and best covered by insurance      to treat your symptoms. I have sent this to your pharmacy.?     It is also important to stay well hydrated, get lots of rest and take over-the-counter decongestants,?tylenol?or ibuprofen if you?are able to?take those medications per your primary care provider to help relieve discomfort.?     It is important that you take?all of?your prescribed medication even if your symptoms are improving after a few doses.? Taking?all of?your medicine helps prevent the symptoms from returning.?     If your symptoms worsen, you develop severe headache, vomiting, high fever (>102), or are not improving in 7 days, please contact your primary care provider for an appointment or visit any of our convenient Walk-in Care or Urgent Care Centers to be seen which can be found on our website?here.?     Thanks again for choosing?us?as your health care partner,?   ?  Yanet Raman PA-C?   Thank you for choosing us for your care. I have placed an order for a prescription so that you can start treatment. View your full visit summary for details by clicking on the link below. Your pharmacist will able to address any questions you may have about the medication.     If you're not feeling better within 5-7 days, please schedule an appointment.  You can schedule an appointment right here in [facility], or call [phone]  If the visit is for the same symptoms as your eVisit, we'll  refund the cost of your eVisit if seen within seven days.

## 2023-12-14 ASSESSMENT — ENCOUNTER SYMPTOMS
DYSURIA: 0
ABDOMINAL PAIN: 0
NAUSEA: 0
HEMATURIA: 0
ARTHRALGIAS: 0
HEADACHES: 0
DIZZINESS: 0
CONSTIPATION: 0
PALPITATIONS: 0
CHILLS: 0
SHORTNESS OF BREATH: 0
SORE THROAT: 0
HEMATOCHEZIA: 0
FREQUENCY: 0
HEARTBURN: 0
JOINT SWELLING: 0
PARESTHESIAS: 0
DIARRHEA: 0
EYE PAIN: 0
BREAST MASS: 0
COUGH: 0
WEAKNESS: 0
NERVOUS/ANXIOUS: 0
FEVER: 0
MYALGIAS: 0

## 2023-12-21 ENCOUNTER — OFFICE VISIT (OUTPATIENT)
Dept: FAMILY MEDICINE | Facility: CLINIC | Age: 48
End: 2023-12-21
Payer: COMMERCIAL

## 2023-12-21 VITALS
HEIGHT: 67 IN | BODY MASS INDEX: 26.37 KG/M2 | HEART RATE: 63 BPM | DIASTOLIC BLOOD PRESSURE: 72 MMHG | RESPIRATION RATE: 16 BRPM | OXYGEN SATURATION: 100 % | TEMPERATURE: 98.2 F | SYSTOLIC BLOOD PRESSURE: 128 MMHG | WEIGHT: 168 LBS

## 2023-12-21 DIAGNOSIS — Z13.1 SCREENING FOR DIABETES MELLITUS: ICD-10-CM

## 2023-12-21 DIAGNOSIS — Z13.29 SCREENING FOR THYROID DISORDER: ICD-10-CM

## 2023-12-21 DIAGNOSIS — G47.00 INSOMNIA, UNSPECIFIED TYPE: ICD-10-CM

## 2023-12-21 DIAGNOSIS — Z00.00 ROUTINE HISTORY AND PHYSICAL EXAMINATION OF ADULT: Primary | ICD-10-CM

## 2023-12-21 DIAGNOSIS — N95.1 PERIMENOPAUSE: ICD-10-CM

## 2023-12-21 LAB
ERYTHROCYTE [DISTWIDTH] IN BLOOD BY AUTOMATED COUNT: 11.2 % (ref 10–15)
HBA1C MFR BLD: 5.3 % (ref 0–5.6)
HCT VFR BLD AUTO: 35.7 % (ref 35–47)
HGB BLD-MCNC: 12.3 G/DL (ref 11.7–15.7)
MCH RBC QN AUTO: 31.5 PG (ref 26.5–33)
MCHC RBC AUTO-ENTMCNC: 34.5 G/DL (ref 31.5–36.5)
MCV RBC AUTO: 91 FL (ref 78–100)
PLATELET # BLD AUTO: 278 10E3/UL (ref 150–450)
RBC # BLD AUTO: 3.91 10E6/UL (ref 3.8–5.2)
WBC # BLD AUTO: 6.5 10E3/UL (ref 4–11)

## 2023-12-21 PROCEDURE — 99396 PREV VISIT EST AGE 40-64: CPT | Mod: 25 | Performed by: NURSE PRACTITIONER

## 2023-12-21 PROCEDURE — 36415 COLL VENOUS BLD VENIPUNCTURE: CPT | Performed by: NURSE PRACTITIONER

## 2023-12-21 PROCEDURE — 85027 COMPLETE CBC AUTOMATED: CPT | Performed by: NURSE PRACTITIONER

## 2023-12-21 PROCEDURE — 83036 HEMOGLOBIN GLYCOSYLATED A1C: CPT | Performed by: NURSE PRACTITIONER

## 2023-12-21 PROCEDURE — 80053 COMPREHEN METABOLIC PANEL: CPT | Performed by: NURSE PRACTITIONER

## 2023-12-21 PROCEDURE — 84443 ASSAY THYROID STIM HORMONE: CPT | Performed by: NURSE PRACTITIONER

## 2023-12-21 PROCEDURE — 90471 IMMUNIZATION ADMIN: CPT | Performed by: NURSE PRACTITIONER

## 2023-12-21 PROCEDURE — 90750 HZV VACC RECOMBINANT IM: CPT | Performed by: NURSE PRACTITIONER

## 2023-12-21 RX ORDER — TRAZODONE HYDROCHLORIDE 50 MG/1
50-100 TABLET, FILM COATED ORAL AT BEDTIME
Qty: 180 TABLET | Refills: 0 | Status: SHIPPED | OUTPATIENT
Start: 2023-12-21

## 2023-12-21 RX ORDER — HYDROXYZINE PAMOATE 50 MG/1
50-100 CAPSULE ORAL
Qty: 180 CAPSULE | Refills: 3 | Status: SHIPPED | OUTPATIENT
Start: 2023-12-21

## 2023-12-21 ASSESSMENT — ENCOUNTER SYMPTOMS
FEVER: 0
HEMATURIA: 0
BREAST MASS: 0
FREQUENCY: 0
SHORTNESS OF BREATH: 0
PARESTHESIAS: 0
NERVOUS/ANXIOUS: 0
CHILLS: 0
CONSTIPATION: 0
ARTHRALGIAS: 0
EYE PAIN: 0
DIARRHEA: 0
WEAKNESS: 0
NAUSEA: 0
DYSURIA: 0
HEARTBURN: 0
ABDOMINAL PAIN: 0
JOINT SWELLING: 0
PALPITATIONS: 0
SORE THROAT: 0
MYALGIAS: 0
HEMATOCHEZIA: 0
HEADACHES: 0
COUGH: 0
DIZZINESS: 0

## 2023-12-21 ASSESSMENT — PAIN SCALES - GENERAL: PAINLEVEL: NO PAIN (0)

## 2023-12-21 NOTE — PROGRESS NOTES
SUBJECTIVE:   Aditi is a 48 year old, presenting for the following:  Physical        2023     3:47 PM   Additional Questions   Roomed by Krista ESQUIVEL       Healthy Habits:     Getting at least 3 servings of Calcium per day:  Yes    Bi-annual eye exam:  Yes    Dental care twice a year:  Yes    Sleep apnea or symptoms of sleep apnea:  None    Diet:  Regular (no restrictions)    Frequency of exercise:  None    Taking medications regularly:  Yes    Medication side effects:  Not applicable    Additional concerns today:  No    Feels like she has been dealing with effects of perimenopause- feels like she is irritable, having trouble sleeping; changes in libido. Feels like medications for depression and anxiety are basically working well; wonders about HRT. She has also been having some heavier vaginal bleeding. Has an IUD in place since 2019.     Social History     Tobacco Use    Smoking status: Never    Smokeless tobacco: Never   Substance Use Topics    Alcohol use: Yes             2023     9:16 AM   Alcohol Use   Prescreen: >3 drinks/day or >7 drinks/week? Not Applicable          No data to display              Reviewed orders with patient.  Reviewed health maintenance and updated orders accordingly - Yes  Lab work is in process  Labs reviewed in EPIC    Breast Cancer Screenin/16/2022     8:53 AM 2023     5:59 PM   Breast CA Risk Assessment (FHS-7)   Do you have a family history of breast, colon, or ovarian cancer? Yes No / Unknown         Pertinent mammograms are reviewed under the imaging tab.    History of abnormal Pap smear: NO - age 30-65 PAP every 5 years with negative HPV co-testing recommended      Latest Ref Rng & Units 2021     9:24 AM 2017    12:00 AM 2016    12:00 AM   PAP / HPV   PAP  Negative for Intraepithelial Lesion or Malignancy (NILM)      HPV 16 DNA Negative Negative      HPV 18 DNA Negative Negative      Other HR HPV Negative Negative      PAP-ABSTRACT    "See Scanned Document     See Scanned Document           This result is from an external source.     Reviewed and updated as needed this visit by clinical staff   Tobacco  Allergies  Meds              Reviewed and updated as needed this visit by Provider                     Review of Systems   Constitutional:  Negative for chills and fever.   HENT:  Negative for congestion, ear pain, hearing loss and sore throat.    Eyes:  Negative for pain and visual disturbance.   Respiratory:  Negative for cough and shortness of breath.    Cardiovascular:  Negative for chest pain, palpitations and peripheral edema.   Gastrointestinal:  Negative for abdominal pain, constipation, diarrhea, heartburn, hematochezia and nausea.   Breasts:  Negative for tenderness, breast mass and discharge.   Genitourinary:  Positive for vaginal bleeding. Negative for dysuria, frequency, genital sores, hematuria, pelvic pain, urgency and vaginal discharge.   Musculoskeletal:  Negative for arthralgias, joint swelling and myalgias.   Skin:  Negative for rash.   Neurological:  Negative for dizziness, weakness, headaches and paresthesias.   Psychiatric/Behavioral:  Negative for mood changes. The patient is not nervous/anxious.         OBJECTIVE:   /72 (BP Location: Right arm, Patient Position: Sitting, Cuff Size: Adult Regular)   Pulse 63   Temp 98.2  F (36.8  C) (Temporal)   Resp 16   Ht 1.702 m (5' 7\")   Wt 76.2 kg (168 lb)   LMP 12/05/2023 (Exact Date)   SpO2 100%   BMI 26.31 kg/m    Physical Exam  GENERAL: healthy, alert and no distress  EYES: Eyes grossly normal to inspection, PERRL and conjunctivae and sclerae normal  HENT: ear canals and TM's normal, nose and mouth without ulcers or lesions  NECK: no adenopathy, no asymmetry, masses, or scars and thyroid normal to palpation  RESP: lungs clear to auscultation - no rales, rhonchi or wheezes  CV: regular rate and rhythm, normal S1 S2, no S3 or S4, no murmur, click or rub, no peripheral " "edema and peripheral pulses strong  ABDOMEN: soft, nontender, no hepatosplenomegaly, no masses and bowel sounds normal  MS: no gross musculoskeletal defects noted, no edema  NEURO: Normal strength and tone, mentation intact and speech normal  PSYCH: mentation appears normal, affect normal/bright    Diagnostic Test Results:  Labs reviewed in Epic  No results found for this or any previous visit (from the past 24 hour(s)).    ASSESSMENT/PLAN:       ICD-10-CM    1. Routine history and physical examination of adult  Z00.00       2. Perimenopause  N95.1 **TSH with free T4 reflex FUTURE 2mo     CBC with platelets     Comprehensive metabolic panel (BMP + Alb, Alk Phos, ALT, AST, Total. Bili, TP)     **TSH with free T4 reflex FUTURE 2mo     CBC with platelets     Comprehensive metabolic panel (BMP + Alb, Alk Phos, ALT, AST, Total. Bili, TP)      3. Screening for diabetes mellitus  Z13.1 Hemoglobin A1c     Hemoglobin A1c      4. Screening for thyroid disorder  Z13.29 **TSH with free T4 reflex FUTURE 2mo     **TSH with free T4 reflex FUTURE 2mo      5. Insomnia, unspecified type  G47.00 traZODone (DESYREL) 50 MG tablet     hydrOXYzine (VISTARIL) 50 MG capsule        -Recommend seeing OBGYn to discuss heavy bleeding and discuss options for HRT; labs to rule out thyroid dysfunction, anemia.  No changes to medications for depression and anxiety  Other medications to continue without change    Patient has been advised of split billing requirements and indicates understanding: Yes      COUNSELING:  Reviewed preventive health counseling, as reflected in patient instructions       Regular exercise       Healthy diet/nutrition       Colorectal Cancer Screening       (Katie)menopause management      BMI:   Estimated body mass index is 26.31 kg/m  as calculated from the following:    Height as of this encounter: 1.702 m (5' 7\").    Weight as of this encounter: 76.2 kg (168 lb).         She reports that she has never smoked. She has " never used smokeless tobacco.          NITIN Mendez CNP  [facility]

## 2023-12-22 DIAGNOSIS — F41.8 SITUATIONAL ANXIETY: ICD-10-CM

## 2023-12-22 LAB
ALBUMIN SERPL BCG-MCNC: 4.7 G/DL (ref 3.5–5.2)
ALP SERPL-CCNC: 62 U/L (ref 40–150)
ALT SERPL W P-5'-P-CCNC: 19 U/L (ref 0–50)
ANION GAP SERPL CALCULATED.3IONS-SCNC: 10 MMOL/L (ref 7–15)
AST SERPL W P-5'-P-CCNC: 18 U/L (ref 0–45)
BILIRUB SERPL-MCNC: 0.2 MG/DL
BUN SERPL-MCNC: 9.9 MG/DL (ref 6–20)
CALCIUM SERPL-MCNC: 9.3 MG/DL (ref 8.6–10)
CHLORIDE SERPL-SCNC: 103 MMOL/L (ref 98–107)
CREAT SERPL-MCNC: 0.68 MG/DL (ref 0.51–0.95)
DEPRECATED HCO3 PLAS-SCNC: 26 MMOL/L (ref 22–29)
EGFRCR SERPLBLD CKD-EPI 2021: >90 ML/MIN/1.73M2
GLUCOSE SERPL-MCNC: 92 MG/DL (ref 70–99)
POTASSIUM SERPL-SCNC: 4.4 MMOL/L (ref 3.4–5.3)
PROT SERPL-MCNC: 6.8 G/DL (ref 6.4–8.3)
SODIUM SERPL-SCNC: 139 MMOL/L (ref 135–145)
TSH SERPL DL<=0.005 MIU/L-ACNC: 1.16 UIU/ML (ref 0.3–4.2)

## 2023-12-24 RX ORDER — SERTRALINE HYDROCHLORIDE 100 MG/1
100 TABLET, FILM COATED ORAL DAILY
Qty: 90 TABLET | Refills: 3 | Status: SHIPPED | OUTPATIENT
Start: 2023-12-24

## 2024-01-04 ENCOUNTER — OFFICE VISIT (OUTPATIENT)
Dept: MIDWIFE SERVICES | Facility: CLINIC | Age: 49
End: 2024-01-04
Payer: COMMERCIAL

## 2024-01-04 VITALS
SYSTOLIC BLOOD PRESSURE: 118 MMHG | WEIGHT: 164.3 LBS | BODY MASS INDEX: 25.73 KG/M2 | DIASTOLIC BLOOD PRESSURE: 69 MMHG | TEMPERATURE: 99.2 F | HEART RATE: 73 BPM

## 2024-01-04 DIAGNOSIS — N95.1 INSOMNIA ASSOCIATED WITH MENOPAUSE: ICD-10-CM

## 2024-01-04 DIAGNOSIS — N93.9 ABNORMAL UTERINE BLEEDING (AUB): Primary | ICD-10-CM

## 2024-01-04 DIAGNOSIS — N95.1 VASOMOTOR SYMPTOMS DUE TO MENOPAUSE: ICD-10-CM

## 2024-01-04 PROCEDURE — 99203 OFFICE O/P NEW LOW 30 MIN: CPT | Performed by: ADVANCED PRACTICE MIDWIFE

## 2024-01-04 RX ORDER — ESTRADIOL 0.03 MG/D
1 FILM, EXTENDED RELEASE TRANSDERMAL
Qty: 16 PATCH | Refills: 4 | Status: SHIPPED | OUTPATIENT
Start: 2024-01-04

## 2024-01-04 ASSESSMENT — PATIENT HEALTH QUESTIONNAIRE - PHQ9
5. POOR APPETITE OR OVEREATING: SEVERAL DAYS
SUM OF ALL RESPONSES TO PHQ QUESTIONS 1-9: 3

## 2024-01-04 ASSESSMENT — ANXIETY QUESTIONNAIRES
7. FEELING AFRAID AS IF SOMETHING AWFUL MIGHT HAPPEN: NOT AT ALL
1. FEELING NERVOUS, ANXIOUS, OR ON EDGE: NOT AT ALL
6. BECOMING EASILY ANNOYED OR IRRITABLE: MORE THAN HALF THE DAYS
2. NOT BEING ABLE TO STOP OR CONTROL WORRYING: NOT AT ALL
GAD7 TOTAL SCORE: 3
5. BEING SO RESTLESS THAT IT IS HARD TO SIT STILL: NOT AT ALL
IF YOU CHECKED OFF ANY PROBLEMS ON THIS QUESTIONNAIRE, HOW DIFFICULT HAVE THESE PROBLEMS MADE IT FOR YOU TO DO YOUR WORK, TAKE CARE OF THINGS AT HOME, OR GET ALONG WITH OTHER PEOPLE: SOMEWHAT DIFFICULT
3. WORRYING TOO MUCH ABOUT DIFFERENT THINGS: NOT AT ALL
GAD7 TOTAL SCORE: 3

## 2024-01-04 NOTE — PROGRESS NOTES
"Aditi Osuna is a 48 year old who presents to the clinic with vaginal spotting x 3 months. She has a Mirena IUD in place since 9/2019 for heavy and painful periods. It has been successful in suppressing her periods except for several episodes of bleeding a spotting in 2020,2022 and now. Upon reflexion these have all been after covid vaccines. The mirena has been in place for 4.5 years and is approaching it's expiration.    She is also interested in discussing hormone replacement therapy. She reports several symptoms of perimenopause including brain fog, irritability, depression, insomnia and vasomotor symptoms. \"Has not been feeling like herself\".     O: /69   Pulse 73   Temp 99.2  F (37.3  C) (Oral)   Wt 74.5 kg (164 lb 4.8 oz)   LMP 01/02/2024 (Exact Date)   Breastfeeding No   BMI 25.73 kg/m    General: well appearing, in NAD  Cardiac: well perfused  Respiratory: non-labored breathing on room air   Psych: alert and oriented     A/P: (N93.9) Abnormal uterine bleeding (AUB)  (primary encounter diagnosis)  Comment: Recommended replacing mirena IUD increased spotting toward expiration likely due to diminished hormone effect. Also recommend pelvic ultrasound to r/o pathology for bleeding. Last ultrasound was 2 years ago and normal. May replace IUD prior to ultrasound so ultrasound can also verify position of new IUD  Plan: US Transvaginal Pelvic Non-OB            (N95.1) Insomnia associated with menopause  Comment:   Plan: estradiol (VIVELLE-DOT) 0.025 MG/24HR bi-weekly        patch            (N95.1) Vasomotor symptoms due to menopause  Comment: Will Start HRT, estrogen in combination with mirena (progesterone) IUD   Plan: estradiol (VIVELLE-DOT) 0.025 MG/24HR bi-weekly        patch          Recommend f/up in 1-3 months to discuss results of HRT and titrate dose if needed.     Alpa Rosenthal, JUNAIDM, APRN CNM      "

## 2024-01-07 DIAGNOSIS — Z91.09 ENVIRONMENTAL ALLERGIES: ICD-10-CM

## 2024-01-08 RX ORDER — CETIRIZINE HYDROCHLORIDE 10 MG/1
10 TABLET ORAL DAILY
Qty: 90 TABLET | Refills: 3 | Status: SHIPPED | OUTPATIENT
Start: 2024-01-08

## 2024-01-19 ENCOUNTER — OFFICE VISIT (OUTPATIENT)
Dept: OBGYN | Facility: CLINIC | Age: 49
End: 2024-01-19
Payer: COMMERCIAL

## 2024-01-19 VITALS
WEIGHT: 164.9 LBS | HEART RATE: 78 BPM | BODY MASS INDEX: 25.83 KG/M2 | SYSTOLIC BLOOD PRESSURE: 112 MMHG | TEMPERATURE: 100.5 F | DIASTOLIC BLOOD PRESSURE: 64 MMHG

## 2024-01-19 DIAGNOSIS — Z30.433 ENCOUNTER FOR REMOVAL AND REINSERTION OF INTRAUTERINE CONTRACEPTIVE DEVICE: ICD-10-CM

## 2024-01-19 DIAGNOSIS — N93.9 ABNORMAL UTERINE BLEEDING (AUB): ICD-10-CM

## 2024-01-19 DIAGNOSIS — Z30.430 ENCOUNTER FOR IUD INSERTION: Primary | ICD-10-CM

## 2024-01-19 PROCEDURE — 88305 TISSUE EXAM BY PATHOLOGIST: CPT | Performed by: STUDENT IN AN ORGANIZED HEALTH CARE EDUCATION/TRAINING PROGRAM

## 2024-01-19 PROCEDURE — 58300 INSERT INTRAUTERINE DEVICE: CPT

## 2024-01-19 PROCEDURE — 58100 BIOPSY OF UTERUS LINING: CPT

## 2024-01-19 PROCEDURE — 58301 REMOVE INTRAUTERINE DEVICE: CPT

## 2024-01-19 RX ORDER — OXYCODONE HYDROCHLORIDE 5 MG/1
5 TABLET ORAL EVERY 6 HOURS PRN
Qty: 3 TABLET | Refills: 0 | Status: SHIPPED | OUTPATIENT
Start: 2024-01-19 | End: 2024-02-12

## 2024-01-19 NOTE — PROGRESS NOTES
SUBJECTIVE:    Is a pregnancy test required: No.  Was a consent obtained?  Yes    Subjective: Aditi Osuna is a 48 year old No obstetric history on file. presents for removal of IUD and desires mirena type IUD.  She requests removal of the IUD because the IUD effectiveness has     Patient has been given the opportunity to ask questions about all forms of birth control, including all options appropriate for Aditi Osuna. Discussed that no method of birth control, except abstinence is 100% effective against pregnancy or sexually transmitted infection.     Aditi Osuna understands she may have the IUD removed at any time. IUD should be removed by a health care provider and the current IUD will be removed today.    The entire removal and insertion procedure was reviewed with the patient, including care after placement.    Today's PHQ-2 Score:       2024     2:52 PM   PHQ-2 (  [employer])   Q1: Little interest or pleasure in doing things 0   Q2: Feeling down, depressed or hopeless 1   PHQ-2 Score 1       PROCEDURE:    Premedicated with ibuprofen.  A speculum exam was performed and the cervix was visualized. The IUD string was visualized. Using ring forceps, the string  was grasped and the IUD removed intact.    PROCEDURE: Endometrial Biopsy  Indication: dysfunctional uterine bleeding and age >35  After discussion with the patient regarding her abnormal uterine bleeding I recommended that the patient proceed with an endometrial biopsy for further diagnosis. The risks, benefits, alternatives, and indications for an endometrial biopsy were discussed with the patient in detail. She understood the risks including infection, bleeding, cervical laceration and uterine perforation with injury to other organs like her bowel and bladder.  The patient was agreeable to proceed with an endometrial biopsy and verbal consent was obtained.   UPT not indicated  A bimanual exam was performed, which revealed an  approximately 6 week size uterus that was anteverted position with no palpable adnexal masses, cervical motion tenderness, or abnormalities. On speculum exam, the patient no abnormalities in her vaginal epithelium. Her cervix was easily visualized and thought to be within normal limits. The cervix and vagina was then cleansed with Betadine x 3. A single-tooth tenaculum was placed on the anterior lip of the cervix and an endometrial biopsy was performed without difficulty. Her uterus sounded to 9 cm.  Adequate tissue appeared to be obtained with 1 passes of the pipelle. The tenaculum was removed from the anterior lip of the cervix. Hemostasis was noted. The patient tolerated the biopsy without difficulty. No complications occurred during the biopsy. Specimen labeled and sent to pathology.     Under sterile technique, cervix was visualized with speculum and prepped with Betadine solution swab x 3. Tenaculum was placed for stability. The uterus was gently straightened and sounded to 9.0 cm. IUD prepared for placement, and IUD inserted according to 's instructions without difficulty or significant ressitance, and deployed at the fundus. The strings were visualized and trimmed to 3.0 cm from the external os. Tenaculum was removed and hemostasis noted. Speculum removed.  Patient tolerated procedure well.    Lot # [id number]  Exp: [id number]    EBL: minimal    Complications: none      POST PROCEDURE:    Given 's handouts, including when to have IUD removed, list of danger s/sx, side effects and follow up recommended. Encouraged condom use for prevention of STD. Advised to call for any fever, for prolonged or severe pain or bleeding, abnormal vaginal dischage, or unable to palpate strings. She was advised to use pain medications (ibuprofen) as needed for mild to moderate pain. Advised to follow-up in clinic in 4-6 weeks for IUD string check if unable to find strings or as directed by provider. Discussed  pathology results expected within 7 days, will contact with results. Discussed any abnormal or malignant results would be referred to gyn-onc. Has TVUS scheduled 1/24 with follow up from CHRISTIANO Jorge CNM. HRT started 2 weeks ago feeling improvement in constipation, has Follow-up with CHRISTIANO Jorge CNM for hrt. Will send staff msg of pathology results to CHRISTIANO Jorge CNM.      NITIN Maier CNP

## 2024-01-19 NOTE — PATIENT INSTRUCTIONS
What Mirena Users May Expect    What to watch for right after Mirena is placed  Some women may experience uterine cramps, bleeding, and/or dizziness during and right after Mirena is placed. To help minimize the cramps, you may taken ibuprofen 600 mg with food prior to your appointment. These symptoms should improve over the next 24 hours.  Mild cramping may be present for a few days after your placement  As a follow up, you should check your strings on 4 weeks or visit your clinic once in the first 4 to 12 weeks after Mirena is placed to make sure it is in the right position. After that, Mirena can be checked once a year as part of your routine exam.    Please use a back-up method (abstinence or condoms) for 5 days after placement.    Your periods may change  For the first 3 to 6 months, your monthly period may become irregular. You may also have frequent spotting or light bleeding. A few women have heavy bleeding during this time. After your body adjusts, the number of bleeding days is likely to decrease (but may remain irregular), and you may even find that your periods stop altogether for as long as Mirena is in place. Around the end of the third month of use, you may see up to a 75% reduction in the amount of menstrual bleeding. By one year, about 1 out of 5 users may hay have no period at all. At the end of two years, 70% have little or no bleeding. Your periods will return rapidly once Mirena is removed.     Mirena Strings  You may check your own Mirena strings by inserting a finger into the vagina and feeling the strings as they exit the cervix.  The strings will initially feel firm, like fishing line, but will soften over a few weeks.  After the strings have softened, you or your partner should not be able to feel the strings during intercourse.  If you can feel the IUD, see your healthcare provider to have the position confirmed.  You may use tampons with Mirena in place.    Mirena does not protect against  HIV or STDs.  Mirena does not prevent the formation of ovarian cysts.  Mirena does not typically reduce acne or cause weight gain or mood changes.    Please call [facility] at [phone] if you have questions or concerns.    For more information:  [email or web address]   IUD information:     Benefits: The IUD can be 97-99% effective when carefully following directions regarding use. It can be more effective if used with additional contraception. IUD containing progestin may decrease menstrual flow and menstrual cramping.     Risks/Side Effects: include but are not limited to spotting, bleeding, hemorrhage, or anemia: cramping or pain: partial or complete expulsion of device; lost IUD strings; uterine or cervical perforation; embedding of IUD in the uterine wall; increased risk of pelvic inflammatory disease. Women who become pregnant with an IUD in place are at a higher risk for ectopic pregnancy should a pregnancy occur with an IUD in situ. There is a higher rate of miscarriage when pregnancy occurs with IUD in place.    Warning signs: Please call clinic if you have abnormal spotting or heavy bleeding, abdominal pain, dyspareunia, fever, chills, flu like symptoms, or unable to locate strings of IUD, or strings are longer or shorter than expected.    You are encouraged to use condoms for prevention of STD. You may also need back up contraception for 7 days with your IUD. You may use pain medications (ibuprofen) as needed for mild to moderate pain. Please follow-up in clinic in 4-6 weeks for IUD string check if unable to find strings or as directed by provider.

## 2024-01-23 LAB
PATH REPORT.COMMENTS IMP SPEC: NORMAL
PATH REPORT.FINAL DX SPEC: NORMAL
PATH REPORT.GROSS SPEC: NORMAL
PATH REPORT.MICROSCOPIC SPEC OTHER STN: NORMAL
PATH REPORT.RELEVANT HX SPEC: NORMAL
PHOTO IMAGE: NORMAL

## 2024-01-25 ENCOUNTER — MYC MEDICAL ADVICE (OUTPATIENT)
Dept: FAMILY MEDICINE | Facility: CLINIC | Age: 49
End: 2024-01-25
Payer: COMMERCIAL

## 2024-02-02 ENCOUNTER — HOSPITAL ENCOUNTER (OUTPATIENT)
Dept: ULTRASOUND IMAGING | Facility: CLINIC | Age: 49
Discharge: HOME | End: 2024-02-02
Attending: ADVANCED PRACTICE MIDWIFE | Admitting: ADVANCED PRACTICE MIDWIFE
Payer: COMMERCIAL

## 2024-02-02 DIAGNOSIS — N93.9 ABNORMAL UTERINE BLEEDING (AUB): ICD-10-CM

## 2024-02-02 PROCEDURE — 76830 TRANSVAGINAL US NON-OB: CPT

## 2024-02-02 PROCEDURE — 76856 US EXAM PELVIC COMPLETE: CPT

## 2024-02-02 PROCEDURE — 76830 TRANSVAGINAL US NON-OB: CPT | Mod: 26 | Performed by: RADIOLOGY

## 2024-02-02 PROCEDURE — 76856 US EXAM PELVIC COMPLETE: CPT | Mod: 26 | Performed by: RADIOLOGY

## 2024-02-05 ENCOUNTER — MYC MEDICAL ADVICE (OUTPATIENT)
Dept: FAMILY MEDICINE | Facility: CLINIC | Age: 49
End: 2024-02-05
Payer: COMMERCIAL

## 2024-02-05 DIAGNOSIS — F41.1 GAD (GENERALIZED ANXIETY DISORDER): Primary | ICD-10-CM

## 2024-02-05 RX ORDER — BUPROPION HYDROCHLORIDE 100 MG/1
100 TABLET, EXTENDED RELEASE ORAL
Qty: 90 TABLET | Refills: 0 | Status: SHIPPED | OUTPATIENT
Start: 2024-02-05 | End: 2024-04-15

## 2024-02-10 DIAGNOSIS — Z91.09 ENVIRONMENTAL ALLERGIES: ICD-10-CM

## 2024-02-12 RX ORDER — PSEUDOEPHEDRINE HCL 30 MG/1
30 TABLET, FILM COATED ORAL DAILY
Qty: 48 TABLET | Refills: 3 | Status: SHIPPED | OUTPATIENT
Start: 2024-02-12

## 2024-02-22 ENCOUNTER — E-VISIT (OUTPATIENT)
Dept: URGENT CARE | Facility: CLINIC | Age: 49
End: 2024-02-22
Payer: COMMERCIAL

## 2024-02-22 DIAGNOSIS — J01.90 ACUTE NON-RECURRENT SINUSITIS, UNSPECIFIED LOCATION: Primary | ICD-10-CM

## 2024-02-22 PROCEDURE — 99421 OL DIG E/M SVC 5-10 MIN: CPT | Performed by: PHYSICIAN ASSISTANT

## 2024-02-22 RX ORDER — DOXYCYCLINE HYCLATE 100 MG
100 TABLET ORAL 2 TIMES DAILY
Qty: 14 TABLET | Refills: 0 | Status: SHIPPED | OUTPATIENT
Start: 2024-02-22 | End: 2024-02-29

## 2024-02-22 NOTE — PATIENT INSTRUCTIONS
Acute Sinusitis: Care Instructions  Overview     Acute sinusitis is an inflammation of the mucous membranes inside the nose and sinuses. Sinuses are the hollow spaces in your skull around the eyes and nose. Acute sinusitis often follows a cold. Acute sinusitis causes thick, discolored mucus that drains from the nose or down the back of the throat. It also can cause pain and pressure in your head and face along with a stuffy or blocked nose.  In most cases, sinusitis gets better on its own in 1 to 2 weeks. But some mild symptoms may last for several weeks. Sometimes antibiotics are needed if there is a bacterial infection.  Follow-up care is a key part of your treatment and safety. Be sure to make and go to all appointments, and call your doctor if you are having problems. It's also a good idea to know your test results and keep a list of the medicines you take.  How can you care for yourself at home?  Use saline (saltwater) nasal washes. This can help keep your nasal passages open and wash out mucus and allergens.  You can buy saline nose washes at a grocery store or drugstore. Follow the instructions on the package.  You can make your own at home. Add 1 teaspoon of non-iodized salt and 1 teaspoon of baking soda to 2 cups of distilled or boiled and cooled water. Fill a squeeze bottle or a nasal cleansing pot (such as a neti pot) with the nasal wash. Then put the tip into your nostril, and lean over the sink. With your mouth open, gently squirt the liquid. Repeat on the other side.  Try a decongestant nasal spray like oxymetazoline (Afrin). Do not use it for more than 3 days in a row. Using it for more than 3 days can make your congestion worse.  If needed, take an over-the-counter pain medicine, such as acetaminophen (Tylenol), ibuprofen (Advil, Motrin), or naproxen (Aleve). Read and follow all instructions on the label.  If the doctor prescribed antibiotics, take them as directed. Do not stop taking them just  "because you feel better. You need to take the full course of antibiotics.  Be careful when taking over-the-counter cold or flu medicines and Tylenol at the same time. Many of these medicines have acetaminophen, which is Tylenol. Read the labels to make sure that you are not taking more than the recommended dose. Too much acetaminophen (Tylenol) can be harmful.  Try a steroid nasal spray. It may help with your symptoms.  Breathe warm, moist air. You can use a steamy shower, a hot bath, or a sink filled with hot water. Avoid cold, dry air. Using a humidifier in your home may help. Follow the directions for cleaning the machine.  When should you call for help?   Call your doctor now or seek immediate medical care if:    You have new or worse swelling, redness, or pain in your face or around one or both of your eyes.     You have double vision or a change in your vision.     You have a high fever.     You have a severe headache and a stiff neck.     You have mental changes, such as feeling confused or much less alert.   Watch closely for changes in your health, and be sure to contact your doctor if:    You are not getting better as expected.   Where can you learn more?  Go to https://www.[employer].net/patiented  Enter I933 in the search box to learn more about \"Acute Sinusitis: Care Instructions.\"  Current as of: February 28, 2023               Content Version: 13.8    5256-7489 [employer].   Care instructions adapted under license by your healthcare professional. If you have questions about a medical condition or this instruction, always ask your healthcare professional. [employer] disclaims any warranty or liability for your use of this information.      Dear Aditi Osuna    After reviewing your responses, I've been able to diagnose you with Acute non-recurrent sinusitis, unspecified location.      Based on your responses and diagnosis, I have prescribed   Orders Placed This Encounter "   Medications     doxycycline hyclate (VIBRA-TABS) 100 MG tablet     Sig: Take 1 tablet (100 mg) by mouth 2 times daily for 7 days     Dispense:  14 tablet     Refill:  0     May substitute any formulation of 100mg doxycycline available and best covered by insurance      to treat your symptoms. I have sent this to your pharmacy.?     It is also important to stay well hydrated, get lots of rest and take over-the-counter decongestants,?tylenol?or ibuprofen if you?are able to?take those medications per your primary care provider to help relieve discomfort.?     It is important that you take?all of?your prescribed medication even if your symptoms are improving after a few doses.? Taking?all of?your medicine helps prevent the symptoms from returning.?     If your symptoms worsen, you develop severe headache, vomiting, high fever (>102), or are not improving in 7 days, please contact your primary care provider for an appointment or visit any of our convenient Walk-in Care or Urgent Care Centers to be seen which can be found on our website?here.?     Thanks again for choosing?us?as your health care partner,?   ?  Shayna Medrano PA-C?

## 2024-02-23 ENCOUNTER — MYC MEDICAL ADVICE (OUTPATIENT)
Dept: FAMILY MEDICINE | Facility: CLINIC | Age: 49
End: 2024-02-23
Payer: COMMERCIAL

## 2024-02-23 ENCOUNTER — NURSE TRIAGE (OUTPATIENT)
Dept: FAMILY MEDICINE | Facility: CLINIC | Age: 49
End: 2024-02-23
Payer: COMMERCIAL

## 2024-02-23 DIAGNOSIS — U07.1 INFECTION DUE TO 2019 NOVEL CORONAVIRUS: Primary | ICD-10-CM

## 2024-02-23 NOTE — TELEPHONE ENCOUNTER
RN COVID TREATMENT VISIT  02/23/24      The patient has been triaged and does not require a higher level of care.    Aditi Osuna  48 year old  Current weight? 164 lbs    Has the patient been seen by a primary care provider at an [facility] or [facility] within the past two years? Yes.   Have you been in close proximity to/do you have a known exposure to a person with a confirmed case of influenza? No.     General treatment eligibility:  Date of positive COVID test (PCR or at home)?  2-    Are you or have you been hospitalized for this COVID-19 infection? No.   Have you received monoclonal antibodies or antiviral treatment for COVID-19 since this positive test? No.   Do you have any of the following conditions that place you at risk of being very sick from COVID-19?   - Age 50 years or older  - Overweight or Obesity (BMI >85th percentile or BMI 25 or higher)  Yes, patient has at least one high risk condition as noted above.     Current COVID symptoms:   - cough  - fatigue  - muscle or body aches  - headache  - sore throat  - congestion or runny nose  - diarrhea  Yes. Patient has at least one symptom as selected.     How many days since symptoms started? 5 days or less. Established patient, 12 years or older weighing at least 88.2 lbs, who has symptoms that started in the past 5 days, has not been hospitalized nor received treatment already, and is at risk for being very sick from COVID-19.     Treatment eligibility by RN:  Are you currently pregnant or nursing? No  Do you have a clinically significant hypersensitivity to nirmatrelvir or ritonavir, or toxic epidermal necrolysis (TEN) or Hines-Franco Syndrome? No  Do you have a history of hepatitis, any hepatic impairment on the Problem List (such as Child-Fischer Class C, cirrhosis, fatty liver disease, alcoholic liver disease), or was the last liver lab (hepatic panel, ALT, AST, ALK Phos, bilirubin) elevated in the past 6  months? No  Do you have any history of severe renal impairment (eGFR < 30mL/min)? No    Is patient eligible to continue? Yes, patient meets all eligibility requirements for the RN COVID treatment (as denoted by all no responses above).     Current Outpatient Medications   Medication Sig Dispense Refill    azelastine (ASTELIN) 0.1 % nasal spray Spray 1 spray into both nostrils At Bedtime 90 mL 11    buPROPion (WELLBUTRIN SR) 100 MG 12 hr tablet Take 1 tablet (100 mg) by mouth daily (with breakfast) 90 tablet 0    buPROPion (WELLBUTRIN XL) 150 MG 24 hr tablet Take 1 tablet (150 mg) by mouth every morning 90 tablet 3    cetirizine (ZYRTEC) 10 MG tablet TAKE ONE TABLET BY MOUTH ONCE DAILY 90 tablet 3    doxycycline hyclate (VIBRA-TABS) 100 MG tablet Take 1 tablet (100 mg) by mouth 2 times daily for 7 days 14 tablet 0    estradiol (VIVELLE-DOT) 0.025 MG/24HR bi-weekly patch Place 1 patch onto the skin twice a week 16 patch 4    fluticasone (FLONASE) 50 MCG/ACT nasal spray USE TWO SPRAYS INTO BOTH  NOSTRILS ONCE DAILY 16 g 3    glucosamine-chondroitin 500-400 MG CAPS Take 1 capsule by mouth daily      guaiFENesin 1200 MG TB12 Take 1 tablet (1,200 mg) by mouth 2 times daily 60 tablet 0    hydrOXYzine (VISTARIL) 50 MG capsule Take 1-2 capsules ( mg) by mouth nightly as needed for other (sleep) 180 capsule 3    multivitamin w/minerals (THERA-VIT-M) tablet Take 1 tablet by mouth daily      Omega-3 Fatty Acids (OMEGA-3 FISH OIL PO) Take 1 g by mouth      sertraline (ZOLOFT) 100 MG tablet TAKE ONE TABLET BY MOUTH ONCE DAILY 90 tablet 3    SUDOGEST MAXIMUM STRENGTH 30 MG tablet TAKE ONE TABLET BY MOUTH ONCE DAILY 48 tablet 3    traZODone (DESYREL) 50 MG tablet Take 1-2 tablets ( mg) by mouth at bedtime 180 tablet 0    VITAMIN D, CHOLECALCIFEROL, PO Take 2,000 Units by mouth daily         Medications from List 1 of the standing order (on medications that exclude the use of Paxlovid) that patient is taking: NONE. Is  patient taking Onsted's Wort? No  Is patient taking Sandra's Wort or any meds from List 1? No.   Medications from List 2 of the standing order (on meds that provider needs to adjust) that patient is taking: NONE. Is patient on any of the meds from List 2? No.   Medications from List 3 of standing order (on meds that a RN needs to adjust) that patient is taking: bupropion (Wellbutrin, Zyban): Instructed patient to continue taking buproprion as directed but know that it may have less effect while taking Paxlovid.   ethinyl estradiol/ethinyl estradiol containing meds (most common - Apri,Aviane, Ortho Cyclen, Ortho Tri Cyclen, Sprintec, Tri-Sprintec, Cryselle): Instructed patient to use a back-up method of birth control or abstain from intercourse while on Paxlovid until both one month after completion of Paxlovid and a new pill pack starts.   trazodone (Desyrel): Instructed patient to stop taking trazodone while taking Paxlovid and restart trazodone 3 days after the completion of Paxlovid.  Is patient on any meds from List 3? Yes. Patient is on meds from list 3. No meds require a provider visit and at least one med required RN to adjust.     Paxlovid has an approximate 90% reduction in hospitalization. Paxlovid can possibly cause altered sense of taste, diarrhea (loose, watery stools), high blood pressure, muscle aches.     Would patient like a Paxlovid prescription?   Yes.   Lab Results   Component Value Date    GFRESTIMATED >90 12/21/2023       Was last eGFR reduced? No, eGFR 60 or greater/ No Result on record. Patient can receive the normal renal function dose. Paxlovid Rx sent to [place] [facility]   PReferred    Temporary change to home medications: See ablove    All medication adjustments (holds, etc) were discussed with the patient and patient was asked to repeat back (teachback) their med adjustment.  Did patient understand med adjustment? Yes, patient repeated back and understood correctly.        Reviewed  the following instructions with the patient:    Paxlovid (nimatrelvir and ritonavir)    How it works  Two medicines (nirmatrelvir and ritonavir) are taken together. They stop the virus from growing. Less amount of virus is easier for your body to fight.    How to take  Medicine comes in a daily container with both medicine tablets. Take by mouth twice daily (once in the morning, once at night) for 5 days.  The number of tablets to take varies by patient.  Don't chew or break capsules. Swallow whole.    When to take  Take as soon as possible after positive COVID-19 test result, and within 5 days of your first symptoms.    Possible side effects  Can cause altered sense of taste, diarrhea (loose, watery stools), high blood pressure, muscle aches.    Rosmery Alanis RN       Additional Information   Negative: SEVERE difficulty breathing (e.g., struggling for each breath, speaks in single words)   Negative: Difficult to awaken or acting confused (e.g., disoriented, slurred speech)   Negative: Bluish (or gray) lips or face now   Negative: Shock suspected (e.g., cold/pale/clammy skin, too weak to stand, low BP, rapid pulse)   Negative: Sounds like a life-threatening emergency to the triager   Negative: SEVERE or constant chest pain or pressure  (Exception: Mild central chest pain, present only when coughing.)   Negative: MODERATE difficulty breathing (e.g., speaks in phrases, SOB even at rest, pulse 100-120)   Negative: Headache and stiff neck (can't touch chin to chest)   Negative: Oxygen level (e.g., pulse oximetry) 90% or lower   Negative: Chest pain or pressure  (Exception: MILD central chest pain, present only when coughing.)   Negative: Drinking very little and dehydration suspected (e.g., no urine > 12 hours, very dry mouth, very lightheaded)   Negative: Patient sounds very sick or weak to the triager    Protocols used: Coronavirus (COVID-19) Diagnosed or [id number]

## 2024-03-07 DIAGNOSIS — F41.1 GAD (GENERALIZED ANXIETY DISORDER): ICD-10-CM

## 2024-03-07 RX ORDER — BUPROPION HYDROCHLORIDE 100 MG/1
100 TABLET, EXTENDED RELEASE ORAL
Qty: 90 TABLET | Refills: 0 | OUTPATIENT
Start: 2024-03-07

## 2024-04-15 ENCOUNTER — VIRTUAL VISIT (OUTPATIENT)
Dept: FAMILY MEDICINE | Facility: CLINIC | Age: 49
End: 2024-04-15
Payer: COMMERCIAL

## 2024-04-15 DIAGNOSIS — N95.1 PERIMENOPAUSE: ICD-10-CM

## 2024-04-15 DIAGNOSIS — F41.1 GAD (GENERALIZED ANXIETY DISORDER): ICD-10-CM

## 2024-04-15 DIAGNOSIS — F51.01 PRIMARY INSOMNIA: Primary | ICD-10-CM

## 2024-04-15 PROCEDURE — 99214 OFFICE O/P EST MOD 30 MIN: CPT | Mod: 95 | Performed by: NURSE PRACTITIONER

## 2024-04-15 NOTE — PROGRESS NOTES
Answers submitted by the patient for this visit:  General Questionnaire (Submitted on 4/8/2024)  Chief Complaint: Chronic problems general questions HPI Form  What is the reason for your visit today? : review reducing wellbutrin  How many servings of fruits and vegetables do you eat daily?: 4 or more  On average, how many sweetened beverages do you drink each day (Examples: soda, juice, sweet tea, etc.  Do NOT count diet or artificially sweetened beverages)?: 1  How many minutes a day do you exercise enough to make your heart beat faster?: 9 or less  How many days a week do you exercise enough to make your heart beat faster?: 3 or less  How many days per week do you miss taking your medication?: 0  Aditi is a 48 year old who is being evaluated via a billable video visit.        Assessment & Plan   Problem List Items Addressed This Visit    None  Visit Diagnoses       Primary insomnia    -  Primary    Relevant Orders    Sleep Psychology  Referral    Adult Sleep Eval & Management  Referral    TRE (generalized anxiety disorder)        Perimenopause               Mood stable; continue tapering off wellbutrin; follow up in about 3 months  Perimenopause symptoms stable on current HRT       See Patient Instructions      Subjective   Aditi is a 48 year old, presenting for the following health issues:  No chief complaint on file.    HPI     Depression and Anxiety   How are you doing with your depression since your last visit? Improved   How are you doing with your anxiety since your last visit?  No change  Are you having other symptoms that might be associated with depression or anxiety? No  Have you had a significant life event? No   Do you have any concerns with your use of alcohol or other drugs? No  Has life-long insomnia; several members of her family have either chronic insomnia or sleep walking/talking disorders. Trazodone works better than any of the alternatives she has tried, but she often  "feels groggy in the morning, and can't increase the dose due to more grogginess. She doesn't like that she feels like she has to take a \"cocktail\" of medications. Without the medications, she may not sleep at all, and has had night terrors in the past related to lack of sleep.    Social History     Tobacco Use    Smoking status: Never    Smokeless tobacco: Never   Vaping Use    Vaping status: Never Used   Substance Use Topics    Alcohol use: Yes    Drug use: No         11/13/2019     5:00 PM 1/4/2024     2:52 PM   PHQ   PHQ-9 Total Score 13 3   Q9: Thoughts of better off dead/self-harm past 2 weeks Not at all Not at all         11/13/2019     5:00 PM 2/6/2023     9:30 AM 1/4/2024     2:52 PM   TRE-7 SCORE   Total Score  2 (minimal anxiety)    Total Score 14 2    2 3           Review of Systems  Constitutional, HEENT, cardiovascular, pulmonary, gi and gu systems are negative, except as otherwise noted.      Objective           Vitals:  No vitals were obtained today due to virtual visit.    Physical Exam   GENERAL: alert and no distress  EYES: Eyes grossly normal to inspection.  No discharge or erythema, or obvious scleral/conjunctival abnormalities.  RESP: No audible wheeze, cough, or visible cyanosis.    SKIN: Visible skin clear. No significant rash, abnormal pigmentation or lesions.  NEURO: Cranial nerves grossly intact.  Mentation and speech appropriate for age.  PSYCH: Appropriate affect, tone, and pace of words    Office Visit on 01/19/2024   Component Date Value Ref Range Status    Case Report 01/19/2024    Final                    Value:Surgical Pathology Report                         Case: [id number]                                  Authorizing Provider:  Ce Cameron APRN   Collected:           01/19/2024 12:38 PM                                 CNP                                                                          Ordering Location:     [facility]   Received:            " 01/19/2024 01:49 PM                                 [place]                                                                    Pathologist:           Lina Bennett MD                                                          Specimen:    Endometrium                                                                                Final Diagnosis 01/19/2024    Final                    Value:This result contains rich text formatting which cannot be displayed here.    Comment 01/19/2024    Final                    Value:This result contains rich text formatting which cannot be displayed here.    Clinical Information 01/19/2024    Final                    Value:This result contains rich text formatting which cannot be displayed here.    Gross Description 01/19/2024    Final                    Value:This result contains rich text formatting which cannot be displayed here.    Microscopic Description 01/19/2024    Final                    Value:This result contains rich text formatting which cannot be displayed here.    Performing Labs 01/19/2024    Final                    Value:This result contains rich text formatting which cannot be displayed here.         Video-Visit Details    Type of service:  Video Visit   Originating Location (pt. Location): Home    Distant Location (provider location):  On-site  Platform used for Video Visit: Christopher  Signed Electronically by: NITIN Mendez CNP

## 2024-04-19 DIAGNOSIS — Z91.09 ENVIRONMENTAL ALLERGIES: ICD-10-CM

## 2024-04-19 RX ORDER — FLUTICASONE PROPIONATE 50 MCG
SPRAY, SUSPENSION (ML) NASAL
Qty: 16 G | Refills: 2 | Status: SHIPPED | OUTPATIENT
Start: 2024-04-19 | End: 2024-08-14

## 2024-06-26 DIAGNOSIS — Z91.09 ENVIRONMENTAL ALLERGIES: ICD-10-CM

## 2024-06-27 RX ORDER — AZELASTINE HYDROCHLORIDE 137 UG/1
SPRAY, METERED NASAL
Qty: 90 ML | Refills: 2 | Status: SHIPPED | OUTPATIENT
Start: 2024-06-27

## 2024-08-01 ASSESSMENT — SLEEP AND FATIGUE QUESTIONNAIRES
HOW LIKELY ARE YOU TO NOD OFF OR FALL ASLEEP WHILE SITTING QUIETLY AFTER LUNCH WITHOUT ALCOHOL: WOULD NEVER DOZE
HOW LIKELY ARE YOU TO NOD OFF OR FALL ASLEEP WHILE SITTING AND TALKING TO SOMEONE: WOULD NEVER DOZE
HOW LIKELY ARE YOU TO NOD OFF OR FALL ASLEEP WHILE SITTING INACTIVE IN A PUBLIC PLACE: WOULD NEVER DOZE
HOW LIKELY ARE YOU TO NOD OFF OR FALL ASLEEP WHILE LYING DOWN TO REST IN THE AFTERNOON WHEN CIRCUMSTANCES PERMIT: WOULD NEVER DOZE
HOW LIKELY ARE YOU TO NOD OFF OR FALL ASLEEP WHEN YOU ARE A PASSENGER IN A CAR FOR AN HOUR WITHOUT A BREAK: WOULD NEVER DOZE
HOW LIKELY ARE YOU TO NOD OFF OR FALL ASLEEP IN A CAR, WHILE STOPPED FOR A FEW MINUTES IN TRAFFIC: WOULD NEVER DOZE
HOW LIKELY ARE YOU TO NOD OFF OR FALL ASLEEP WHILE WATCHING TV: WOULD NEVER DOZE
HOW LIKELY ARE YOU TO NOD OFF OR FALL ASLEEP WHILE SITTING AND READING: WOULD NEVER DOZE

## 2024-08-06 ENCOUNTER — VIRTUAL VISIT (OUTPATIENT)
Dept: SLEEP MEDICINE | Facility: CLINIC | Age: 49
End: 2024-08-06
Attending: NURSE PRACTITIONER
Payer: COMMERCIAL

## 2024-08-06 VITALS — HEIGHT: 67 IN | BODY MASS INDEX: 25.9 KG/M2 | WEIGHT: 165 LBS

## 2024-08-06 DIAGNOSIS — F51.01 PRIMARY INSOMNIA: Primary | ICD-10-CM

## 2024-08-06 DIAGNOSIS — F41.9 ANXIETY: ICD-10-CM

## 2024-08-06 PROCEDURE — 99204 OFFICE O/P NEW MOD 45 MIN: CPT | Mod: 95 | Performed by: NURSE PRACTITIONER

## 2024-08-06 ASSESSMENT — PAIN SCALES - GENERAL: PAINLEVEL: NO PAIN (0)

## 2024-08-06 NOTE — PATIENT INSTRUCTIONS
Behavioral Sleep Medicine Program    The [facility] Behavioral Sleep Medicine Program,  provides non-drug treatment for sleep problems as part of our multi-discipline sleep medicine program. Services offered include:    Cognitive-behavioral Therapies for Insomnia (CBT-I)  Management of Shift-work and Jet Lag Sleep Disorders  Management of Delayed, Advanced and Irregular Circadian Rhythm Sleep Disorders  Imagery Rehearsal Therapy (IRT) for Nightmare Disorder  Adaptation to PAP Therapy for Obstructive Sleep Apnea  Behavioral strategies to manage hypersomnia and fatigue    You have been referred for consultation with a sleep psychologist who specializes in behavioral sleep medicine and treatment of insomnia.  [facility] offers individualized telehealth services through our [facility] and online CBT-I.    Preparing for your Consultation    We recommmend you keep a Sleep Diary for at least a week prior to your visit. Complete the sleep diary each day first thing after you get up by answering a few key questions about your sleep using our convenient mobile jared or paper sleep diary.  Your answers should be based on your recall of the past 24 hours.  Avoid watching the clock or recording data during the night.     Insomnia  Jared    The Insomnia  mobile jared  is a convenient way to keep track of your sleep prior to your sleep consultation.  Simply download the free jared on your Apple or Android phone and record your information each morning.  The jared includes training, self-assessment, and sleep schedule recommendations.  Prior to your consultation we recommend you use only the sleep diary function. You can e-mail yourself a copy of your sleep diary data by going to the Settings section and using the [place] User Data function.  During your consultation your provider will review the data with you.          [facility] Sleep Diary    You  can also track your sleep using the [facility] paper sleep diary.  You can upload your sleep diary and send it via a [employer] message, fax it to [phone], or have it with you at the time of your consultation.            CBT-I:  Frequently Asked Questions    What is CBT-I?    Cognitive Behavioral Therapy for Insomnia, also known as CBT-I, is a highly effective non-drug treatment for insomnia. The American College of Physicians recommends CBT-I as the first treatment for chronic insomnia.  Research has shown CBT-I to be safer and more effective long term than sleeping pills.    What does CBT-I involve?     CBT-I targets behaviors that lead to chronic insomnia:  Habits that weaken the bed as a cue for sleep  Habits that weaken your body's sleep drive and sleep/wake clock   Unhelpful sleep thoughts that increase sleep-related worry and arousal.    The process involves 3-6 telehealth visits that guide you to implement proven strategies to get a better night's sleep.    People often see improvement in their sleep within a few weeks. Research shows if you keep practicing the skills you learn your sleep is likely to continue to improve 6-12 months after treatment.    Does this program prescribe or manage sleep medication?    No.  Your prescribing provider is responsible to assist you in managing your sleep medications.  Some people choose to stop using sleep medication prior to or during CBT-I.  Our program can work with your prescribing provider to help reduce or eliminate use of sleep medications.     Getting Started Today!    If you haven't already done so, we recommend you consider making the following changes to your sleep habits prior to your sleep consultation:     Reduce your consumption of caffeine and alcohol.  Both can disrupt sleep and make strengthening your sleep more difficult.  Specifically:    - Avoid caffeine within 6 hours of bedtime   - No more than 3 caffeinated beverages per day (e.g. 8 oz.  "cup coffee or 12 oz. cup soda)            - No alcohol within 3 hours of bedtime    Make sure your bedroom is quiet, comfortable and dark.  Noise, light and an uncomfortable sleep space can harm your sleep.      Keep the same sleep schedule 7 days a week.unless you do shift work.      Our Online CBT-I Program    If you want to get started today, research indicates that online CBT-I can be effective for some individuals. These programs requires comfort with sultana-based or online learning.  However, digital CBT-I programs are not for everyone.  Contraindications include:    Seizure disorders,   Bipolar disorder,   Unstable medical or mental health conditions,   Frailty or risk of falling  Pregnancy    You should consult a sleep specialist before using these resources if you have:    Sleep Apnea  Restless Leg Syndrome  Sleep Walking  REM behavior disorder  Night Terrors  Excessive Daytime Sleepiness  Are engaged in shift work  Use prescription sleep medication    Click on the link below to get started today:                                  [email or web address]             Once you are registered you can share your sleep log data with [facility] where your health provider will be able to review your sleep log and progress.  Once you begin the program, go to the left side navigation bar and click on the  share sleep log  button:                                        This takes you to the next page where you enter the provider code [employer] and click Locate:                 This brings you to the verification page where you should see [facility] identified as your provider                                                                           By clicking \"Submit\" your sleep log data will be sent to our secure [facility] portal for review by you provider.     For Help Contact Customer Care At:    [email or web address]  If your sleep provider recommends online " CBT-I for you , the cost for an entire 6-week program is $40.    To get started, copy and paste the link below which will take you to the landing page to register:                              Self-help Workbooks for Insomnia    If you have found self-help books useful in the past, you may want to consider reading one of the following books prior to your consultation:    Say Shilo to Insomnia: The Six-Week, Drug-Free Program Developed at [facility].  Krystian Diaz MD. Available in paperback, Soni, and audiobook.    Overcoming Insomnia: A Cognitive-Behavioral Therapy Approach, Workbook.  Robby Tamez, PhD  and Radha Gutierrez, PhD.  Available in paperback and Soni.    Quiet Your Mind and Get to Sleep: Solutions to Insomnia for Those with Depression, Anxiety, or Chronic Pain.  Sabina Fields, PhD and Radha Gutierrez, PhD.  Available in paperback and Soni

## 2024-08-06 NOTE — PROGRESS NOTES
Virtual Visit Details    Type of service:  Video Visit     Originating Location (pt. Location): Other Work    Distant Location (provider location):  Off-site  Platform used for Video Visit: [facility]      Outpatient Sleep Medicine Consultation:      Name: Aditi Osuna MRN# [id number]   Age: 49 year old YOB: 1975     Date of Consultation: August 6, 2024  Consultation is requested by: Rebekah Antunez, NITIN CNP  [address]  [place] [address] Rebekah Antunez  Primary care provider: Rebekah Antunez       Reason for Sleep Consult:     Aditi Osuna is sent by Rebekah Antunez for a sleep consultation regarding primary insomnia.    Patient s Reason for visit  Aditi Osuna main reason for visit: Medications i currentky tske make if hard for me to wake up  Patient states problem(s) started: In my late teens  Aditi Osuna's goals for this visit: Discuss sketp meds. I take hydroxozine, melatonin and traxodone. I have nightmates and wake up very tired.           Assessment and Plan:     Summary Sleep Diagnoses:  1. Primary insomnia  2. Anxiety  - Adult Sleep Eval & Management  Referral      Comorbid Diagnoses:  - n/a      Summary Recommendations:  Patient with long history of chronic sleep onset insomnia dating back to her 20s who presents today for symptoms of difficulty waking up on current medication regimen for treatment of insomnia. Currently, she is treating her insomnia with trazodone 50 mg at bedtime, melatonin 5 mg by mouth at bedtime, and hydroxyzine 100 mg by mouth at bedtime which works quite well to help her fall asleep but is making it harder for her to wake up in the morning.  Overall, it appears that she is practicing good sleep hygiene measures.  Discussed consideration for modification to her medication regimen as follows:  -Take melatonin 5 mg approximately 3-4 hours prior to bedtime rather than all at once at bedtime.  -Continue trazodone 50 mg  p.o. nightly  -Try reducing hydroxyzine to 50 mg p.o. nightly or could try 75 mg at bedtime initially and then reduce it further, as tolerated.  3.   There is no specific indication for sleep testing at this time as the patient is largely asymptomatic from a sleep          apnea,RLS/PLMD, or even sleepy behavior perspective.  4.   She has an upcoming appointment for sleep psychology referral with Dr. Isaiah Santos for cognitive behavioral therapy for insomnia (CBT-I) on 11/21/2024.  Information provided in AVS regarding CBT-I for her review and upcoming appointment.  5.   Follow-up in approximately 3 months or as needed to review efficacy of medication adjustments or the patient may elect to send me a [employer] message with an update on the efficacy of these changes.    No orders of the defined types were placed in this encounter.        Summary Counseling:    Sleep Testing Reviewed  Obstructive Sleep Apnea Reviewed  Complications of Untreated Sleep Apnea Reviewed  Previous recent chart notes and lab results reviewed    Medical Decision-making:   Educational materials provided in instructions    Total time spent reviewing medical records, history and physical examination, review of previous testing and interpretation as well as documentation on this date: 54 minutes    CC: Rebekah Antunez          History of Present Illness:     Aditi Osuna is a 49-year-old female with a PMH pertinent for anxiety, night terrors - resolved, and insomnia who presents today with symptoms of chronic insomnia dating back to her 20's. She reports long history of difficulty falling asleep, active mind, feels like has to get up and walk around/pace, maybe get something to eat, or other activities at times which prevent her from falling asleep.  She denies significant restless leg symptoms.  She does have a history of anxiety and is currently being treated with sertraline and to some extent trazodone as well as hydroxyzine.  She had  previously been taking Ambien for several years but was able to wean off of that medication due to worries of addiction to that medication.  Currently, she is treating her insomnia with trazodone 50 mg at bedtime, melatonin 5 mg by mouth at bedtime, and hydroxyzine 100 mg by mouth at bedtime which works quite well to help her fall asleep but is making it harder for her to wake up in the morning.  Additionally, she also indicates occasional waking due to auditory hallucination of being over a tone at night.  She has a history of night terrors, however, she indicates these have largely resolved since going off the Ambien.  Of note, the patient does have an upcoming sleep psychology referral visit with Dr. Isaiah Santos on 11/21/2024.  She was referred by her primary care provider for further evaluation/management of insomnia.    Past Sleep Evaluations: No    SLEEP-WAKE SCHEDULE:     Work/School Days: Patient goes to school/work: Yes   Usually gets into bed at 10  Takes patient about 30 min to fall asleep  Has trouble falling asleep Every night nights per week  Wakes up in the middle of the night Every night times.  Wakes up due to External stimuli (bed partner, pets, noise, etc);Nightmares  She has trouble falling back asleep Sometimes not too ofren times a week.   It usually takes Few min to get back to sleep  Patient is usually up at 630  Uses alarm: Yes    Weekends/Non-work Days/All Other Days:  Usually gets into bed at 11   Takes patient about 30 min to fall asleep  Patient is usually up at 930  Uses alarm: No    Sleep Need  Patient gets  7 hours sleep on average   Patient thinks she needs about 9 hours sleep    Aditi Osuna prefers to sleep in this position(s): Side   Patient states they do the following activities in bed: Read;Use phone, computer, or tablet    Naps  Patient takes a purposeful nap Never times a week and naps are usually NA in duration  She feels better after a nap: No  She dozes off  unintentionally Never days per week  Patient has had a driving accident or near-miss due to sleepiness/drowsiness: No      SLEEP DISRUPTIONS:    Breathing/Snoring  Patient snores:Yes  Other people complain about her snoring: No  Patient has been told she stops breathing in her sleep:No  She has issues with the following:      Movement:  Patient gets pain, discomfort, with an urge to move:  No  It happens when she is resting:  No  It happens more at night:  No  Patient has been told she kicks her legs at night:  No     Behaviors in Sleep:  Aditi Osuna has experienced the following behaviors while sleeping: Recurring Nightmares;Teeth grinding;See or hear things that are not really there upon awakening or just falling asleep  She has experienced sudden muscle weakness during the day: No      Is there anything else you would like your sleep provider to know:        CAFFEINE AND OTHER SUBSTANCES:    Patient consumes caffeinated beverages per day:  3 cups coffee  Last caffeine use is usually: 11am  List of any prescribed or over the counter stimulants that patient takes: Sudafed as needed for allergies  List of any prescribed or over the counter sleep medication patient takes: Melatonin. Hydroxozine. Trazodone.  List of previous sleep medications that patient has tried: Ambien  Patient drinks alcohol to help them sleep: No  Patient drinks alcohol near bedtime: No    Family History:  Patient has a family member been diagnosed with a sleep disorder: Yes -father and sister with insomnia            SCALES:    EPWORTH SLEEPINESS SCALE         8/1/2024     9:34 AM    [place] Sleepiness Scale ( JOS Barger  [id number]<br>ESS - [place]/English - Final version - 21 Nov 07 - [facility].)   Sitting and reading Would never doze   Watching TV Would never doze   Sitting, inactive in a public place (e.g. a theatre or a meeting) Would never doze   As a passenger in a car for an hour without a break Would never doze   Lying  down to rest in the afternoon when circumstances permit Would never doze   Sitting and talking to someone Would never doze   Sitting quietly after a lunch without alcohol Would never doze   In a car, while stopped for a few minutes in traffic Would never doze   [place] Score (MC) 0   [place] Score (Sleep) 0         INSOMNIA SEVERITY INDEX (BECKA)          8/1/2024     9:25 AM   Insomnia Severity Index (BECKA)   Difficulty falling asleep 2   Difficulty staying asleep 1   Problems waking up too early 0   How SATISFIED/DISSATISFIED are you with your CURRENT sleep pattern? 3   How NOTICEABLE to others do you think your sleep problem is in terms of impairing the quality of your life? 3   How WORRIED/DISTRESSED are you about your current sleep problem? 3   To what extent do you consider your sleep problem to INTERFERE with your daily functioning (e.g. daytime fatigue, mood, ability to function at work/daily chores, concentration, memory, mood, etc.) CURRENTLY? 3   BECKA Total Score 15       Guidelines for Scoring/Interpretation:  Total score categories:  0-7 = No clinically significant insomnia   8-14 = Subthreshold insomnia   15-21 = Clinical insomnia (moderate severity)  22-28 = Clinical insomnia (severe)  Used via courtesy of www.iPinYouth.va.gov with permission from Indra Park PhD., [facility]      STOP BANG score: 2        8/6/2024     3:02 PM   STOP BANG Questionnaire (  2008, the American Society of Anesthesiologists, Inc. [employer]   BMI Clinic: 25.84         GAD7        1/4/2024     2:52 PM   TRE-7    1. Feeling nervous, anxious, or on edge 0   2. Not being able to stop or control worrying 0   3. Worrying too much about different things 0   4. Trouble relaxing 1   5. Being so restless that it is hard to sit still 0   6. Becoming easily annoyed or irritable 2   7. Feeling afraid, as if something awful might happen 0   TRE-7 Total Score 3   If you checked any problems, how difficult  "have they made it for you to do your work, take care of things at home, or get along with other people? Somewhat difficult         CAGE-AID         No data to display                CAGE-AID reprinted with permission from the [facility] Journal, ANGELA Zavala. and ROSALINDA Martínez, \"Conjoint screening questionnaires for alcohol and drug abuse\" [place] Medical Journal 94: 135-140, 1995.      PATIENT HEALTH QUESTIONNAIRE-9 (PHQ - 9)        1/4/2024     2:52 PM   PHQ-9 ([employer])   1.  Little interest or pleasure in doing things 0   2.  Feeling down, depressed, or hopeless 1   3.  Trouble falling or staying asleep, or sleeping too much 1   4.  Feeling tired or having little energy 1   5.  Poor appetite or overeating 0   6.  Feeling bad about yourself - or that you are a failure or have let yourself or your family down 0   7.  Trouble concentrating on things, such as reading the newspaper or watching television 0   8.  Moving or speaking so slowly that other people could have noticed. Or the opposite - being so fidgety or restless that you have been moving around a lot more than usual 0   9.  Thoughts that you would be better off dead, or of hurting yourself in some way 0   PHQ-9 Total Score 3   If you checked off any problems, how difficult have these problems made it for you to do your work, take care of things at home, or get along with other people? Not difficult at all   6.  Feeling bad about yourself 0   7.  Trouble concentrating 0   8.  Moving slowly or restless 0   9.  Suicidal or self-harm thoughts 0   Difficulty at work, home, or with people Not difficult at all       Developed by Ashlie Louis, Kristen Mercado, Malik Turner and colleagues, with an educational sherrill from [employer] No permission required to reproduce, translate, display or distribute.        Allergies:    Allergies   Allergen Reactions    Amoxicillin-Pot Clavulanate Nausea and Vomiting    Augmentin [Amoxicillin-Pot Clavulanate]     " Sulfa Antibiotics Rash       Medications:    Current Outpatient Medications   Medication Sig Dispense Refill    Azelastine HCl 137 MCG/SPRAY SOLN USE ONE SPRAY IN EACH NOSTRIL EVERY NIGHT AT BEDTIME 90 mL 2    cetirizine (ZYRTEC) 10 MG tablet TAKE ONE TABLET BY MOUTH ONCE DAILY 90 tablet 3    estradiol (VIVELLE-DOT) 0.025 MG/24HR bi-weekly patch Place 1 patch onto the skin twice a week 16 patch 4    fluticasone (FLONASE) 50 MCG/ACT nasal spray USE 2 SPRAYS INTO BOTH NOSTRILS ONCE DAILY 16 g 2    glucosamine-chondroitin 500-400 MG CAPS Take 1 capsule by mouth daily      guaiFENesin 1200 MG TB12 Take 1 tablet (1,200 mg) by mouth 2 times daily 60 tablet 0    hydrOXYzine (VISTARIL) 50 MG capsule Take 1-2 capsules ( mg) by mouth nightly as needed for other (sleep) 180 capsule 3    multivitamin w/minerals (THERA-VIT-M) tablet Take 1 tablet by mouth daily      Omega-3 Fatty Acids (OMEGA-3 FISH OIL PO) Take 1 g by mouth      sertraline (ZOLOFT) 100 MG tablet TAKE ONE TABLET BY MOUTH ONCE DAILY 90 tablet 3    SUDOGEST MAXIMUM STRENGTH 30 MG tablet TAKE ONE TABLET BY MOUTH ONCE DAILY 48 tablet 3    traZODone (DESYREL) 50 MG tablet Take 1-2 tablets ( mg) by mouth at bedtime 180 tablet 0    VITAMIN D, CHOLECALCIFEROL, PO Take 2,000 Units by mouth daily         Problem List:  Patient Active Problem List    Diagnosis Date Noted    Encounter for IUD insertion 01/19/2024     Priority: Medium    Screening for cervical cancer      Priority: Medium     2013 NIL pap  8/25/16 NIL pap, Neg HPV  9/6/17 NIL pap, Neg HPV  11/17/21 NIL pap, Neg HPV.       IUD (intrauterine device) in place 12/10/2019     Priority: Medium     Mirena placed 9/26/2019      Insomnia, unspecified type 12/10/2019     Priority: Medium        Past Medical/Surgical History:  Past Medical History:   Diagnosis Date    Anxiety     Encounter for IUD insertion 01/19/2024    mirena IUD inserted 1/19/24 due for removal 1/19/2032    Night terrors      Past  Surgical History:   Procedure Laterality Date    BIOPSY BREAST      2022    COLONOSCOPY N/A 05/12/2023    Procedure: Colonoscopy;  Surgeon: To Bermeo MD;  Location:  GI       Social History:  Social History     Socioeconomic History    Marital status:      Spouse name: Not on file    Number of children: Not on file    Years of education: Not on file    Highest education level: Not on file   Occupational History    Not on file   Tobacco Use    Smoking status: Never    Smokeless tobacco: Never   Vaping Use    Vaping status: Never Used   Substance and Sexual Activity    Alcohol use: Yes    Drug use: No    Sexual activity: Yes     Partners: Male     Birth control/protection: Condom, I.U.D.   Other Topics Concern    Parent/sibling w/ CABG, MI or angioplasty before 65F 55M? Not Asked   Social History Narrative    Not on file     Social Determinants of Health     Financial Resource Strain: Low Risk  (12/14/2023)    Financial Resource Strain     Within the past 12 months, have you or your family members you live with been unable to get utilities (heat, electricity) when it was really needed?: No   Food Insecurity: Low Risk  (12/14/2023)    Food Insecurity     Within the past 12 months, did you worry that your food would run out before you got money to buy more?: No     Within the past 12 months, did the food you bought just not last and you didn t have money to get more?: No   Transportation Needs: Low Risk  (12/14/2023)    Transportation Needs     Within the past 12 months, has lack of transportation kept you from medical appointments, getting your medicines, non-medical meetings or appointments, work, or from getting things that you need?: No   Physical Activity: Not on file   Stress: Not on file   Social Connections: Not on file   Interpersonal Safety: Low Risk  (12/21/2023)    Interpersonal Safety     Do you feel physically and emotionally safe where you currently live?: Yes     Within the past 12  months, have you been hit, slapped, kicked or otherwise physically hurt by someone?: No     Within the past 12 months, have you been humiliated or emotionally abused in other ways by your partner or ex-partner?: No   Housing Stability: Low Risk  (12/14/2023)    Housing Stability     Do you have housing? : Yes     Are you worried about losing your housing?: No       Family History:  Family History   Problem Relation Age of Onset    Osteoporosis Mother     Hypertension Father     Diabetes Father     Coronary Artery Disease Father 70    Other - See Comments Father         multiple stents    Lymphoma Father     Skin Cancer Father         basal cell    Sleep Disorder Father     Depression Sister     Sleep walking Sister     Other - See Comments Sister         borderline personality disorder    Anxiety Disorder Brother     No Known Problems Maternal Grandmother     Other Cancer Maternal Grandfather 50        colon    Emphysema Paternal Grandmother     Heart Disease Paternal Grandfather     No Known Problems Daughter         b.2014    Breast Cancer Paternal Aunt 65       Review of Systems:  A complete review of systems reviewed by me is negative with the exeption of what has been mentioned in the history of present illness.  In the last TWO WEEKS have you experienced any of the following symptoms?  Fevers: No  Night Sweats: Yes  Weight Gain: No  Pain at Night: No  Double Vision: No  Changes in Vision: No  Difficulty Breathing through Nose: No  Sore Throat in Morning: No  Dry Mouth in the Morning: No  Shortness of Breath Lying Flat: No  Shortness of Breath With Activity: No  Awakening with Shortness of Breath: No  Increased Cough: No  Heart Racing at Night: No  Swelling in Feet or Legs: No  Diarrhea at Night: No  Heartburn at Night: No  Urinating More than Once at Night: No  Losing Control of Urine at Night: No  Joint Pains at Night: No  Headaches in Morning: No  Weakness in Arms or Legs: No  Depressed Mood: No  Anxiety:  "Yes     Physical Examination:  Vitals: Ht 1.702 m (5' 7\")   Wt 74.8 kg (165 lb)   BMI 25.84 kg/m    BMI= Body mass index is 25.84 kg/m .           GENERAL APPEARANCE: healthy, alert, no distress, and cooperative  EYES: Eyes grossly normal to inspection  RESP: Unlabored, easy breathing with normal conversational speech  CV: color normal  NEURO: Alert and oriented x 3, normal strength and tone, mentation intact, and speech normal  PSYCH: mentation appears normal and affect normal/bright  Mallampati Class: Unable to examine  Tonsillar Stage: Unable to examine         Data: All pertinent previous laboratory data reviewed     Recent Labs   Lab Test 12/21/23  1651 02/28/22  0910    139   POTASSIUM 4.4 4.1   CHLORIDE 103 110*   CO2 26 21   ANIONGAP 10 8   GLC 92 101*   BUN 9.9 10   CR 0.68 0.77   RICHAR 9.3 9.6       Recent Labs   Lab Test 12/21/23  1651   WBC 6.5   RBC 3.91   HGB 12.3   HCT 35.7   MCV 91   MCH 31.5   MCHC 34.5   RDW 11.2          Recent Labs   Lab Test 12/21/23  1651   PROTTOTAL 6.8   ALBUMIN 4.7   BILITOTAL 0.2   ALKPHOS 62   AST 18   ALT 19       TSH   Date Value   12/21/2023 1.16 uIU/mL   11/17/2021 0.88 mU/L       No results found for: \"UAMP\", \"UBARB\", \"BENZODIAZEUR\", \"UCANN\", \"UCOC\", \"OPIT\", \"UPCP\"    No results found for: \"IRONSAT\", \"YH60477\", \"PANDA\"    No results found for: \"PH\", \"PHARTERIAL\", \"PO2\", \"DS3WBSZTVKP\", \"SAT\", \"PCO2\", \"HCO3\", \"BASEEXCESS\", \"FEI\", \"BEB\"    @LABRCNTIPR(phv:4,pco2v:4,po2v:4,hco3v:4,yrn:4,o2per:4)@    Echocardiology: No results found for this or any previous visit (from the past 4320 hour(s)).    Chest x-ray:   No results found for this or any previous visit from the past 365 days.      Chest CT:   No results found for this or any previous visit from the past 365 days.      PFT: Most Recent [facility] Pulmonary Function Testing    No results found for: \"20001\"  No results found for: \"20002\"  No results found for: \"20003\"  No results found for: \"20015\"  No results " "found for: \"20016\"  No results found for: \"20027\"  No results found for: \"20028\"  No results found for: \"20029\"  No results found for: \"20079\"  No results found for: \"20080\"  No results found for: \"20081\"  No results found for: \"20335\"  No results found for: \"20105\"  No results found for: \"20053\"  No results found for: \"20054\"  No results found for: \"20055\"      NITIN Joaquin CNP 8/6/2024   Sleep Medicine      This note was written with the assistance of the Dragon voice-dictation technology software. The final document, although reviewed, may contain errors. For corrections, please contact the office.          "

## 2024-08-06 NOTE — NURSING NOTE
Current patient location: [address]  [facility] [address]    Is the patient currently in the state of [place]? YES    Visit mode:VIDEO    If the visit is dropped, the patient can be reconnected by: VIDEO VISIT: Send to e-mail at: [email or web address]    Will anyone else be joining the visit? NO  (If patient encounters technical issues they should call [phone]    How would you like to obtain your AVS? MyChart    Are changes needed to the allergy or medication list? No    Are refills needed on medications prescribed by this physician? NO    Rooming Documentation:  Assigned questionnaire(s) completed      Reason for visit: Consult    Himanshu CLARKF       Patient transferred to [facility] [address].   All personal belongings transferred with patient.   Report given to FRED Dixon.    Patient voided when transferred to new room.   Antibiotic crushed and in pudding for patient to finish.  Accepting RN aware, father aware.   Patient tolerated ambulation with 2L oxymask.

## 2024-08-10 ASSESSMENT — SLEEP AND FATIGUE QUESTIONNAIRES
HOW LIKELY ARE YOU TO NOD OFF OR FALL ASLEEP WHILE SITTING AND READING: WOULD NEVER DOZE
HOW LIKELY ARE YOU TO NOD OFF OR FALL ASLEEP WHILE LYING DOWN TO REST IN THE AFTERNOON WHEN CIRCUMSTANCES PERMIT: WOULD NEVER DOZE
HOW LIKELY ARE YOU TO NOD OFF OR FALL ASLEEP WHILE SITTING AND TALKING TO SOMEONE: WOULD NEVER DOZE
HOW LIKELY ARE YOU TO NOD OFF OR FALL ASLEEP WHILE SITTING INACTIVE IN A PUBLIC PLACE: WOULD NEVER DOZE
HOW LIKELY ARE YOU TO NOD OFF OR FALL ASLEEP IN A CAR, WHILE STOPPED FOR A FEW MINUTES IN TRAFFIC: WOULD NEVER DOZE
HOW LIKELY ARE YOU TO NOD OFF OR FALL ASLEEP WHILE SITTING QUIETLY AFTER LUNCH WITHOUT ALCOHOL: WOULD NEVER DOZE
HOW LIKELY ARE YOU TO NOD OFF OR FALL ASLEEP WHEN YOU ARE A PASSENGER IN A CAR FOR AN HOUR WITHOUT A BREAK: WOULD NEVER DOZE
HOW LIKELY ARE YOU TO NOD OFF OR FALL ASLEEP WHILE WATCHING TV: WOULD NEVER DOZE

## 2024-08-14 DIAGNOSIS — Z91.09 ENVIRONMENTAL ALLERGIES: ICD-10-CM

## 2024-08-14 RX ORDER — FLUTICASONE PROPIONATE 50 MCG
2 SPRAY, SUSPENSION (ML) NASAL DAILY
Qty: 16 G | Refills: 0 | Status: SHIPPED | OUTPATIENT
Start: 2024-08-14

## 2024-08-15 ENCOUNTER — VIRTUAL VISIT (OUTPATIENT)
Dept: SLEEP MEDICINE | Facility: CLINIC | Age: 49
End: 2024-08-15
Attending: NURSE PRACTITIONER
Payer: COMMERCIAL

## 2024-08-15 VITALS — HEIGHT: 67 IN | BODY MASS INDEX: 25.9 KG/M2 | WEIGHT: 165 LBS

## 2024-08-15 DIAGNOSIS — G47.21 CIRCADIAN RHYTHM SLEEP DISORDER, DELAYED SLEEP PHASE TYPE: ICD-10-CM

## 2024-08-15 DIAGNOSIS — F51.04 CHRONIC INSOMNIA: Primary | ICD-10-CM

## 2024-08-15 PROCEDURE — 90791 PSYCH DIAGNOSTIC EVALUATION: CPT | Mod: 95 | Performed by: PSYCHOLOGIST

## 2024-08-15 ASSESSMENT — PAIN SCALES - GENERAL: PAINLEVEL: NO PAIN (0)

## 2024-08-15 NOTE — PROGRESS NOTES
Virtual Visit Details    Type of service:  Video Visit     Originating Location (pt. Location): Home    Distant Location (provider location):  Off-site  Platform used for Video Visit: Christopher

## 2024-08-15 NOTE — NURSING NOTE
Current patient location: [address]  [facility] [address]    Is the patient currently in the state of [place]? YES    Visit mode:VIDEO    If the visit is dropped, the patient can be reconnected by: VIDEO VISIT: Text to cell phone:   Telephone Information:   Mobile [phone]       Will anyone else be joining the visit? NO  (If patient encounters technical issues they should call [phone])    How would you like to obtain your AVS? MyChart    Are changes needed to the allergy or medication list? No    Are refills needed on medications prescribed by this physician? NO    Rooming Documentation:  Assigned questionnaire(s) completed      Reason for visit: Consult    Himanshu MAYFIELD

## 2024-08-15 NOTE — PROGRESS NOTES
Visit Start Time: 9:00 AM  Visit End Time:9:47 AM     SLEEP MEDICINE CONSULTATION  Behavioral Sleep Medicine  Aug 15, 2024    Name: Aditi Osuna MRN# [id number]   Age: 49 year old YOB: 1975     Date of Consultation: Aug 15, 2024  Consultation is requested by: NITIN Mendez CNP  [address]  [place] [address]  Primary care provider: Rebekah Antunez    Reason for Sleep Consultation     Aditi Osuna is a 49 year old female seen today for a behavioral sleep medicine consultation because of insomnia    Assessment and Plan     Sleep Diagnoses:    Chronic insomnia  Circadian rhythm disorder, delayed sleep phase type    Co-occurring Conditions:    Anxiety  Allergic Rhinitis     Clinical Impressions:    Aditi Osuna was seen for a sleep psychology consultation and possible behavioral sleep intervention and treatment. History and clinical presentation       Recommendations and Counselin.  Establish a consistent wake time of 6 AM using an alarm placed away from your bed.    2.  At your rise time, utilize your bright light box for 30 minutes per recommended specifications in terms of distance from your eyes.  Avoid looking into the light directly link your use of light boxes with a morning routine such as morning coffee, breakfast, using her laptop etc.    3.  Consider revising your dose of melatonin to match evidence-based use in individuals with delayed sleep phase circadian rhythm.  The recommended typical doses between 0.5-1 mg taken 4-5 hours before desired bedtime.  In your case taking around 7 PM should be sufficient.  Confer with your doctor if you have any questions of about use of melatonin.    4.  Take your other prescription sleep medications as directed by your physician.    5.  Discontinue use of cell phone, and screens/devices by 8:30 PM.  It is okay to watch TV, in a dark room, had a distance of at least 8 feet.  Recommend you put television on  bluelight blocking setting if it has not or reduce the brightness level.    6.  Your earliest recommended time to bed is 10:30 PM.    Aditi was provided information about the pathophysiology of insomnia, psychophysiological factors contributing to the onset and maintenance of insomnia and how co-occurring medical conditions and intrinsic sleep disorders can affect sleep.  Treatment options were discussed  as applicable to patient specific sleep concerns and symptoms. The benefits and potential early side effects of treatment including increased daytime sleepiness were discussed.     Patient was advised to consult with their prescribing provider around use of or changes to prescription sleep medication.  Patient was counseled on the importance of avoiding driving if drowsy.    Services provided are compliant with the requirements of [place] Statute SS 256B.0625 Subd. 3b and paragraph (b)     Follow-up: 2 weeks     History of Present Sleep Complaint     Aditi Osuna is a 49 year old year old female who presents with onset of insomnia in her early twenties.      She was started on Amitriptyline and was on this for a number of hears.  She had night terrors in her twenties but these remitted.  In her thirties she was on Ambien for ten years and gradually transitioned to a combination of trazodone, hydroxyzine and Melatonin.      Activities in bed: Read, Use phone, computer, or tablet  Prescribed or OTC stimulants:    Prescribed or OTC sleep medication: Traxodone. Hydroxozine. Melatonin.  Previous sleep medications patients has tried: Ambien    SLEEP-WAKE SCHEDULE:    Work/School Days: Patient goes to school/work: Yes     Time to Bed:  10  Sleep Latency: 25 min to fall asleep  Difficulty falling asleep:  Daily nights per week  Number of awakenings: 0-1 times per night due to External stimuli (bed partner, pets, noise, etc), Nightmares  Trouble falling back asleep None  times a week   Usually takes Few min to get  back to sleep  Rise time: 7  Uses alarm? Yes    Weekends/Non-work Days/All Other Days    Usually gets into bed at 11   Sleep latency:  25 min   Rise time: 8 or 9   Uses alarm? Yes    Patient sleep estimates:    Average total sleep time:  7 hours   Patient estimate of sleep need: 8 hours  Preferred sleep position(s): Side     Naps    Intentional naps: Never times a week  Duration:    in duration  Feels better after a nap:    Unintentional Dozing:  Never days per week  Driving accident or near-miss due to sleepiness/drowsiness? No    SLEEP DISRUPTIONS:    Breathing/Snoring    Patient snores:No  Other people complain about Her snoring: No  Patient has been told She stops breathing in Her sleep: No  She has issues with any of the following: Morning mouth dryness, Stuffy nose when you wake up    Movement:    Patient gets pain, discomfort, with an urge to move: No  Symptoms occur when She is resting:    Symptoms occur more at night:   Patient has been told She kicks Her legs at night:      Behaviors in Sleep:    Aditi Osuna has experienced the following behaviors while sleeping: Recurring Nightmares, Teeth grinding, Night terrors (screaming,yelling or acting afraid but not recalling event), See or hear things that are not really there upon awakening or just falling asleep    She has experienced sudden muscle weakness during the day: No    Caffeine, Alcohol and Other Substances:    Number of caffeinated beverages(per day: 3 cups coffee  Last caffeine use is usually: 11am  Uses alcohol to promote sleep: No  Drinks alcohol near bedtime: No      FAMILY HISTORY OF SLEEP DISORDERS    Patient has a family member been diagnosed with a sleep disorder: Yes  Insomnia           SCALES     EPWORTH SLEEPINESS SCALE      Sitting and reading 0   Watching TV 0   Sitting, inactive in a public place (theatre or mtg.) 0    As a passenger in a car 0   Lying down to rest in the afternoon when circumstance permit 0   Sitting and talking to  someone 0   Sitting quietly after lunch without alcohol 0   In a car, while stopped for a few minutes in traffic 0   TOTAL SCORE (nl <11) 0     INSOMNIA SEVERITY INDEX       Difficulty falling asleep 2   Difficult staying asleep 0   Problems waking up to early 1   How SATISFIED/DISSATISFIED are you with your CURRENT sleep pattern? 3   How NOTICEABLE to others do you think your sleep pattern is in terms of your quality of life? 2   How WORRIED/DISTRESSED are you about your current sleep pattern? 2   To what extent do you consider your sleep problem to INTERFERE with your daily fuctioning(e.g. daytime fatigue, mood, ability to function at work/daily chores, concentration, mood,etc.) CURRENTLY? 3   INSOMNIA SEVERITY INDEX TOTAL SCORE 13    Absence of insomnia (0-7); sub-threshold insomnia (8-14); moderate insomnia (15-21); and severe insomnia (22-28)    STOP BANG     1. Snoring: Do you snore loudly (louder than talking or loud enough to be heard through closed doors)?  No    2. Tired: Do you often feel tired, fatigued, or sleepy during daytime?  Yes    3. Observed: Has anyone observed you stop breathing during your sleep?  No    4. Blood pressure: Do you have or are you being treated for high blood pressure?  No    5. BMI: BMI more than 35 kg/m2?  No    6. Age: Age over 50 yr old?  Yes    7. Neck circumference: Neck circumference greater than 40 cm?  No    8. Gender: Gender male?  No    STOP-BANG Total Score: 2    SAUL Risk  0-2 Low risk for SAUL  3-4  Intermediate risk for SAUL  5-8 High risk      PATIENT HEALTH QUESTIONNAIRE-9 (PHQ - 9)    Over the last 2 weeks, how often have you been bothered by any of the following problems?    1. Little interest or pleasure in doing things -      2. Feeling down, depressed, or hopeless -      3. Trouble falling or staying asleep, or sleeping too much -     4. Feeling tired or having little energy -      5. Poor appetite or overeating -      6. Feeling bad about yourself - or that  you are a failure or have let yourself or your family down -      7. Trouble concentrating on things, such as reading the newspaper or watching television -     8. Moving or speaking so slowly that other people could have noticed? Or the opposite - being so fidgety or restless that you have been moving around a lot more than usual     9. Thoughts that you would be better off dead or of hurting  yourself in some way     Total Score:       If you checked off any problems, how difficult have these problems made it for you to do your work, take care of things at home, or get along with other people?      Developed by Ashlie Louis, Kristen Mercado, Malik Turner and colleagues, with an educational sherrill from [employer] No permission required to reproduce, translate, display or distribute. permission required to reproduce, translate, display or distribute.      TRE-7        11/13/2019     5:00 PM 2/6/2023     9:30 AM 1/4/2024     2:52 PM   TRE-7 SCORE   Total Score  2 (minimal anxiety)    Total Score 14 2    2 3       CAGE-AID    CAGE- AID Score -        No data to display                CAGE-AID score  > 1 is a positive screen, suggesting further discussion is needed to determine if evaluation for alcohol or substance abuse is appropriate.  A score > 2 is considered clinically significant, suggesting further evaluation of alcohol or substance-related problems is indicated.         Previous Sleep Consultations/Studies     NITIN Perea, sleep medicine consultation, 8-6-24    Summary Sleep Diagnoses:  1. Primary insomnia  2. Anxiety  - Adult Sleep Eval & Management  Referral        Comorbid Diagnoses:  - n/a        Summary Recommendations:  Patient with long history of chronic sleep onset insomnia dating back to her 20s who presents today for symptoms of difficulty waking up on current medication regimen for treatment of insomnia. Currently, she is treating her insomnia with trazodone 50 mg at  bedtime, melatonin 5 mg by mouth at bedtime, and hydroxyzine 100 mg by mouth at bedtime which works quite well to help her fall asleep but is making it harder for her to wake up in the morning.  Overall, it appears that she is practicing good sleep hygiene measures.  Discussed consideration for modification to her medication regimen as follows:  -Take melatonin 5 mg approximately 3-4 hours prior to bedtime rather than all at once at bedtime.  -Continue trazodone 50 mg p.o. nightly  -Try reducing hydroxyzine to 50 mg p.o. nightly or could try 75 mg at bedtime initially and then reduce it further, as tolerated.  3.   There is no specific indication for sleep testing at this time as the patient is largely asymptomatic from a sleep          apnea,RLS/PLMD, or even sleepy behavior perspective.  4.   She has an upcoming appointment for sleep psychology referral with Dr. Isaiah Santos for cognitive behavioral therapy for insomnia (CBT-I) on 11/21/2024.  Information provided in AVS regarding CBT-I for her review and upcoming appointment.  5.   Follow-up in approximately 3 months or as needed to review efficacy of medication adjustments or the patient may elect to send me a [employer] message with an update on the efficacy of these changes.    Vitals     There were no vitals taken for this visit.     Medical History     Allergies:    Allergies   Allergen Reactions    Amoxicillin-Pot Clavulanate Nausea and Vomiting    Augmentin [Amoxicillin-Pot Clavulanate]     Sulfa Antibiotics Rash       Medications:    Current Outpatient Medications   Medication Sig Dispense Refill    Azelastine HCl 137 MCG/SPRAY SOLN USE ONE SPRAY IN EACH NOSTRIL EVERY NIGHT AT BEDTIME 90 mL 2    cetirizine (ZYRTEC) 10 MG tablet TAKE ONE TABLET BY MOUTH ONCE DAILY 90 tablet 3    estradiol (VIVELLE-DOT) 0.025 MG/24HR bi-weekly patch Place 1 patch onto the skin twice a week 16 patch 4    fluticasone (FLONASE) 50 MCG/ACT nasal spray USE TWO SPRAYS IN EACH  NOSTRIL ONCE DAILY 16 g 0    glucosamine-chondroitin 500-400 MG CAPS Take 1 capsule by mouth daily      hydrOXYzine (VISTARIL) 50 MG capsule Take 1-2 capsules ( mg) by mouth nightly as needed for other (sleep) 180 capsule 3    multivitamin w/minerals (THERA-VIT-M) tablet Take 1 tablet by mouth daily      sertraline (ZOLOFT) 100 MG tablet TAKE ONE TABLET BY MOUTH ONCE DAILY 90 tablet 3    SUDOGEST MAXIMUM STRENGTH 30 MG tablet TAKE ONE TABLET BY MOUTH ONCE DAILY 48 tablet 3    traZODone (DESYREL) 50 MG tablet Take 1-2 tablets ( mg) by mouth at bedtime 180 tablet 0    VITAMIN D, CHOLECALCIFEROL, PO Take 2,000 Units by mouth daily         Problem List:  Patient Active Problem List    Diagnosis Date Noted    Encounter for IUD insertion 01/19/2024     Priority: Medium    Screening for cervical cancer      Priority: Medium     2013 NIL pap  8/25/16 NIL pap, Neg HPV  9/6/17 NIL pap, Neg HPV  11/17/21 NIL pap, Neg HPV.       IUD (intrauterine device) in place 12/10/2019     Priority: Medium     Mirena placed 9/26/2019      Insomnia, unspecified type 12/10/2019     Priority: Medium        Past Medical/Surgical History:  Past Medical History:   Diagnosis Date    Anxiety     Encounter for IUD insertion 01/19/2024    mirena IUD inserted 1/19/24 due for removal 1/19/2032    Night terrors      Patient Active Problem List    Diagnosis Date Noted    Encounter for IUD insertion 01/19/2024     Priority: Medium    Screening for cervical cancer      Priority: Medium     2013 NIL pap  8/25/16 NIL pap, Neg HPV  9/6/17 NIL pap, Neg HPV  11/17/21 NIL pap, Neg HPV.       IUD (intrauterine device) in place 12/10/2019     Priority: Medium     Mirena placed 9/26/2019      Insomnia, unspecified type 12/10/2019     Priority: Medium     Patient Active Problem List   Diagnosis    IUD (intrauterine device) in place    Insomnia, unspecified type    Screening for cervical cancer    Encounter for IUD insertion        Most Recent  Labs:  Office Visit on 01/19/2024   Component Date Value Ref Range Status    Case Report 01/19/2024    Final                    Value:Surgical Pathology Report                         Case: [id number]                                  Authorizing Provider:  Ce Cameron APRN   Collected:           01/19/2024 12:38 PM                                 CNP                                                                          Ordering Location:     [facility]   Received:            01/19/2024 01:49 PM                                 [place]                                                                    Pathologist:           Lina Bennett MD                                                          Specimen:    Endometrium                                                                                Final Diagnosis 01/19/2024    Final                    Value:This result contains rich text formatting which cannot be displayed here.    Comment 01/19/2024    Final                    Value:This result contains rich text formatting which cannot be displayed here.    Clinical Information 01/19/2024    Final                    Value:This result contains rich text formatting which cannot be displayed here.    Gross Description 01/19/2024    Final                    Value:This result contains rich text formatting which cannot be displayed here.    Microscopic Description 01/19/2024    Final                    Value:This result contains rich text formatting which cannot be displayed here.    Performing Labs 01/19/2024    Final                    Value:This result contains rich text formatting which cannot be displayed here.       Mental Health History     Prior Mental Health Diagnosis:   Anxiety per sleep medicine consultation    Mental Health Treatment:   None reported    Chemical Abuse/Treatment:    None reported    Family History     Family History   Problem Relation Age of Onset    Osteoporosis  Mother     Hypertension Father     Diabetes Father     Coronary Artery Disease Father 70    Other - See Comments Father         multiple stents    Lymphoma Father     Skin Cancer Father         basal cell    Sleep Disorder Father     Depression Sister     Sleep walking Sister     Other - See Comments Sister         borderline personality disorder    Anxiety Disorder Brother     No Known Problems Maternal Grandmother     Other Cancer Maternal Grandfather 50        colon    Emphysema Paternal Grandmother     Heart Disease Paternal Grandfather     No Known Problems Daughter         b.2014    Breast Cancer Paternal Aunt 65       Social History         Social Determinants of Health     Food Insecurity: Low Risk  (12/14/2023)    Food Insecurity     Within the past 12 months, did you worry that your food would run out before you got money to buy more?: No     Within the past 12 months, did the food you bought just not last and you didn t have money to get more?: No   Depression: Not at risk (8/6/2024)    PHQ-2     PHQ-2 Score: 0   Housing Stability: Low Risk  (12/14/2023)    Housing Stability     Do you have housing? : Yes     Are you worried about losing your housing?: No   Tobacco Use: Low Risk  (8/6/2024)    Patient History     Smoking Tobacco Use: Never     Smokeless Tobacco Use: Never     Passive Exposure: Not on file   Financial Resource Strain: Low Risk  (12/14/2023)    Financial Resource Strain     Within the past 12 months, have you or your family members you live with been unable to get utilities (heat, electricity) when it was really needed?: No   Alcohol Use: Not on file   Transportation Needs: Low Risk  (12/14/2023)    Transportation Needs     Within the past 12 months, has lack of transportation kept you from medical appointments, getting your medicines, non-medical meetings or appointments, work, or from getting things that you need?: No   Physical Activity: Not on file   Interpersonal Safety: Low Risk   (12/21/2023)    Interpersonal Safety     Do you feel physically and emotionally safe where you currently live?: Yes     Within the past 12 months, have you been hit, slapped, kicked or otherwise physically hurt by someone?: No     Within the past 12 months, have you been humiliated or emotionally abused in other ways by your partner or ex-partner?: No   Stress: Not on file   Social Connections: Not on file   Health Literacy: Not on file         Mental Status Examination     Aditi presented as oriented X3 with speech and language intact.  The patient was cooperative throughout the evaluation with no signs of hallucinations, delusions, loosening of associations or other thought disturbance.  Mood was normal Affect was congruent with mood. Insight and judgement were intact.  Memory appeared intact for recent and remote elements.  There was no report of suicidal ideation, intention or plan. Attention and concentration were within normal.        Isaiah Santos, Julio Cesar, LP, DBSM  Diplomate, Behavioral Sleep Medicine  [facility]      Copy:   Rebekah Antunez, NITIN CNP  [address] [place] [id number]    Note: This dictation was created using voice recognition software. This document may contain an error not identified before finalizing the document. If the error changes the accuracy of the document, I would appreciate it being brought to my attention.            no

## 2024-08-15 NOTE — PATIENT INSTRUCTIONS
Recommendations and Counselin.  Establish a consistent wake time of 6 AM using an alarm placed away from your bed.    2.  At your rise time, utilize your bright light box for 30 minutes per recommended specifications in terms of distance from your eyes.  Avoid looking into the light directly link your use of light boxes with a morning routine such as morning coffee, breakfast, using her laptop etc.    3.  Consider revising your dose of melatonin to match evidence-based use in individuals with delayed sleep phase circadian rhythm.  The recommended typical doses between 0.5-1 mg taken 4-5 hours before desired bedtime.  In your case taking around 7 PM should be sufficient.  Confer with your doctor if you have any questions of about use of melatonin.    4.  Take your other prescription sleep medications as directed by your physician.    5.  Discontinue use of cell phone, and screens/devices by 8:30 PM.  It is okay to watch TV, in a dark room, had a distance of at least 8 feet.  Recommend you put television on bluelight blocking setting if it has not or reduce the brightness level.    6.  Your earliest recommended time to bed is 10:30 PM.

## 2024-08-26 ASSESSMENT — SLEEP AND FATIGUE QUESTIONNAIRES
HOW LIKELY ARE YOU TO NOD OFF OR FALL ASLEEP WHILE SITTING AND READING: WOULD NEVER DOZE
HOW LIKELY ARE YOU TO NOD OFF OR FALL ASLEEP WHEN YOU ARE A PASSENGER IN A CAR FOR AN HOUR WITHOUT A BREAK: WOULD NEVER DOZE
HOW LIKELY ARE YOU TO NOD OFF OR FALL ASLEEP WHILE SITTING AND TALKING TO SOMEONE: WOULD NEVER DOZE
HOW LIKELY ARE YOU TO NOD OFF OR FALL ASLEEP WHILE LYING DOWN TO REST IN THE AFTERNOON WHEN CIRCUMSTANCES PERMIT: WOULD NEVER DOZE
HOW LIKELY ARE YOU TO NOD OFF OR FALL ASLEEP WHILE WATCHING TV: WOULD NEVER DOZE
HOW LIKELY ARE YOU TO NOD OFF OR FALL ASLEEP IN A CAR, WHILE STOPPED FOR A FEW MINUTES IN TRAFFIC: WOULD NEVER DOZE
HOW LIKELY ARE YOU TO NOD OFF OR FALL ASLEEP WHILE SITTING INACTIVE IN A PUBLIC PLACE: WOULD NEVER DOZE
HOW LIKELY ARE YOU TO NOD OFF OR FALL ASLEEP WHILE SITTING QUIETLY AFTER LUNCH WITHOUT ALCOHOL: WOULD NEVER DOZE

## 2024-08-30 ENCOUNTER — VIRTUAL VISIT (OUTPATIENT)
Dept: SLEEP MEDICINE | Facility: CLINIC | Age: 49
End: 2024-08-30
Payer: COMMERCIAL

## 2024-08-30 VITALS — BODY MASS INDEX: 25.9 KG/M2 | WEIGHT: 165 LBS | HEIGHT: 67 IN

## 2024-08-30 DIAGNOSIS — F51.04 CHRONIC INSOMNIA: Primary | ICD-10-CM

## 2024-08-30 DIAGNOSIS — G47.21 CIRCADIAN RHYTHM SLEEP DISORDER, DELAYED SLEEP PHASE TYPE: ICD-10-CM

## 2024-08-30 PROCEDURE — 90832 PSYTX W PT 30 MINUTES: CPT | Mod: 95 | Performed by: PSYCHOLOGIST

## 2024-08-30 ASSESSMENT — PAIN SCALES - GENERAL: PAINLEVEL: NO PAIN (0)

## 2024-08-30 NOTE — PROGRESS NOTES
Virtual Visit Details    Type of service:  Video Visit     Originating Location (pt. Location): Home    Distant Location (provider location):  On-site  Platform used for Video Visit: AmWell    Visit Start Time: 4:31 PM  Visit End Time: 4:52 PM        SLEEP MEDICINE VIRTUAL FOLLOW-UP VISIT  Behavioral Sleep Medicine    Patient Name: Aditi Osuna  MRN:  [id number]  Date of Service: Aug 30, 2024       Subjective Report     Aditi Osuna  returns for a telehealth video visit to discuss progress in implementing behavioral strategies for the management of insomnia and delayed sleep phase disorder.  Patient consent for initiation of video visit was obtained and documented prior to initiation of visit.     Aditi reports she got liquid melatonin now taken at 1 mg.  She notices she is waking up more frequently in the middle of the night. .   Aditi notes that when she took the 1 mg melatonin several hours before bed that she became somewhat groggy but thereafter awakened and had trouble falling asleep.  She observed the same phenomenon when she was taking 5 mg earlier as recommended by Shashi Gong.    Discussed adjustment of sleep schedule if she decides to lower dose of either trazodone or hydroxyzine.  She was advised to confer with her physician if doing so.    4.  Take your other prescription sleep medicati  Initial Recommendations and Counselin.  Establish a consistent wake time of 6 AM using an alarm placed away from your bed.     2.  At your rise time, utilize your bright light box for 30 minutes per recommended specifications in terms of distance from your eyes.  Avoid looking into the light directly link your use of light boxes with a morning routine such as morning coffee, breakfast, using her laptop etc.     3.  Consider revising your dose of melatonin to match evidence-based use in individuals with delayed sleep phase circadian rhythm.  The recommended typical doses between 0.5-1 mg taken 4-5  hours before desired bedtime.  In your case taking around 7 PM should be sufficient.  Confer with your doctor if you have any questions of about use of melatonin.     ons as directed by your physician.     5.  Discontinue use of cell phone, and screens/devices by 8:30 PM.  It is okay to watch TV, in a dark room, had a distance of at least 8 feet.  Recommend you put television on bluelight blocking setting if it has not or reduce the brightness level.     6.  Your earliest recommended time to bed is 10:30 PM.       Sleep Data:     Source of Sleep Estimates:  Verbal Self-report      EPWORTH SLEEPINESS SCALE    Sitting and reading 0   Watching TV 0   Sitting, inactive in a public place (theatre or mtg.) 0    As a passenger in a car 0   Lying down to rest in the afternoon when circumstance permit 0   Sitting and talking to someone 0   Sitting quietly after lunch without alcohol 0   In a car, while stopped for a few minutes in traffic 0   TOTAL SCORE (nl <11) 0     INSOMNIA SEVERITY INDEX     Difficulty falling asleep 2   Difficult staying asleep 2   Problems waking up to early 0   How SATISFIED/DISSATISFIED are you with your CURRENT sleep pattern? 2   How NOTICEABLE to others do you think your sleep pattern is in terms of your quality of life? 2   How WORRIED/DISTRESSED are you about your current sleep pattern? 2   To what extent do you consider your sleep problem to INTERFERE with your daily fuctioning(e.g. daytime fatigue, mood, ability to function at work/daily chores, concentration, mood,etc.) CURRENTLY? 2   INSOMNIA SEVERITY INDEX TOTAL SCORE 12    Absence of insomnia (0-7); sub-threshold insomnia (8-14); moderate insomnia (15-21); and severe insomnia (22-28)       Interventions     Strategies and recommendations including  melatonin as reji biotic/sleep aid, Stimulus control therapy, and Sleep compression therapy were reviewed and discussed today.     Services provided are compliant with the requirements of  "[place] Statute  256B.0625 Subd. 3b and paragraph (b)       Vital Signs     Ht 1.702 m (5' 7\")   Wt 74.8 kg (165 lb)   BMI 25.84 kg/m       Mental Status     Orientation:  X3  Mood:  normal  Affect:  Congruent with mood  Speech/Language:  Normal  Thought Process: Intact  Associations:  Normal  Thought Content: Normal  Patient does not report any suicidal ideation, intention or plan.    Diagnostic Impressions and Plan        Chronic insomnia  Circadian rhythm sleep disorder, delayed sleep phase type    At follow-up, patient reports that taking melatonin and either 5 mg or 1 mg 3-4 hours before bed as had initially been recommended was not effective.  It made her drowsy too and resulted in greater difficulty falling asleep.  Focus was on maintaining the recommended behavioral sleep plan of 8-hour sleep window and keeping melatonin, likely for its sedative effect, at bedtime as she had done previously.  Now with a more compressed sleep schedule the goal is to determine whether this combination of behavioral changes and her medications help improve sleep consolidation.  She states that her goal continues to eventually to be able to reduce the use of hydroxyzine and melatonin.  We discussed behavioral strategy of reducing/restricting time in bed when tapering medication.    Plan:  Continue current sleep schedule and plan    Follow-up: 6 weeks      Isaiah Santos PsyD, REBECCA, [facility]  Diplomate, Behavioral Sleep Medicine  [facility]      Note: This dictation was created using voice recognition software. This document may contain an error not identified before finalizing the document. If the error changes the accuracy of the document, I would appreciate it being brought to my attention.                           "

## 2024-08-30 NOTE — NURSING NOTE
Current patient location: [address]  [facility] [address]    Is the patient currently in the state of [place]? YES    Visit mode:VIDEO    If the visit is dropped, the patient can be reconnected by: VIDEO VISIT: Send to e-mail at: [email or web address]    Will anyone else be joining the visit? NO  (If patient encounters technical issues they should call [phone])    How would you like to obtain your AVS? MyChart    Are changes needed to the allergy or medication list? No    Are refills needed on medications prescribed by this physician? NO    Rooming Documentation:  Questionnaire(s) completed      Reason for visit: RECHECK    Kathryn CLARKF

## 2024-09-14 ENCOUNTER — MYC MEDICAL ADVICE (OUTPATIENT)
Dept: FAMILY MEDICINE | Facility: CLINIC | Age: 49
End: 2024-09-14

## 2024-09-14 DIAGNOSIS — U07.1 INFECTION DUE TO 2019 NOVEL CORONAVIRUS: Primary | ICD-10-CM

## 2024-09-16 NOTE — TELEPHONE ENCOUNTER
RN COVID TREATMENT VISIT  09/16/24      The patient has been triaged and does not require a higher level of care.    Aditi Osuna  49 year old  Current weight? 146    Has the patient been seen by a primary care provider at an [facility] or [facility] within the past two years? Yes.   Have you been in close proximity to/do you have a known exposure to a person with a confirmed case of influenza? No.     General treatment eligibility:  Date of positive COVID test (PCR or at home)?  9/14/24    Are you or have you been hospitalized for this COVID-19 infection? No.   Have you received monoclonal antibodies or antiviral treatment for COVID-19 since this positive test? No.   Do you have any of the following conditions that place you at risk of being very sick from COVID-19?   - Age 50 years or older  Yes, patient has at least one high risk condition as noted above.     Current COVID symptoms:   - fever or chills  - fatigue  - muscle or body aches  - headache  - new loss of taste or smell  Yes. Patient has at least one symptom as selected.     How many days since symptoms started? 5 days or less. Established patient, 12 years or older weighing at least 88.2 lbs, who has symptoms that started in the past 5 days, has not been hospitalized nor received treatment already, and is at risk for being very sick from COVID-19.     Treatment eligibility by RN:  Are you currently pregnant or nursing? No  Do you have a clinically significant hypersensitivity to nirmatrelvir or ritonavir, or toxic epidermal necrolysis (TEN) or Hines-Franco Syndrome? No  Do you have a history of hepatitis, any hepatic impairment on the Problem List (such as Child-Fischer Class C, cirrhosis, fatty liver disease, alcoholic liver disease), or was the last liver lab (hepatic panel, ALT, AST, ALK Phos, bilirubin) elevated in the past 6 months? No  Do you have any history of severe renal impairment (eGFR < 30mL/min)?no     Is  patient eligible to continue? Yes, patient meets all eligibility requirements for the RN COVID treatment (as denoted by all no responses above).     Current Outpatient Medications   Medication Sig Dispense Refill    Azelastine HCl 137 MCG/SPRAY SOLN USE ONE SPRAY IN EACH NOSTRIL EVERY NIGHT AT BEDTIME 90 mL 2    cetirizine (ZYRTEC) 10 MG tablet TAKE ONE TABLET BY MOUTH ONCE DAILY 90 tablet 3    estradiol (VIVELLE-DOT) 0.025 MG/24HR bi-weekly patch Place 1 patch onto the skin twice a week 16 patch 4    fluticasone (FLONASE) 50 MCG/ACT nasal spray USE TWO SPRAYS IN EACH NOSTRIL ONCE DAILY 16 g 0    glucosamine-chondroitin 500-400 MG CAPS Take 1 capsule by mouth daily      hydrOXYzine (VISTARIL) 50 MG capsule Take 1-2 capsules ( mg) by mouth nightly as needed for other (sleep) 180 capsule 3    multivitamin w/minerals (THERA-VIT-M) tablet Take 1 tablet by mouth daily      sertraline (ZOLOFT) 100 MG tablet TAKE ONE TABLET BY MOUTH ONCE DAILY 90 tablet 3    SUDOGEST MAXIMUM STRENGTH 30 MG tablet TAKE ONE TABLET BY MOUTH ONCE DAILY 48 tablet 3    traZODone (DESYREL) 50 MG tablet Take 1-2 tablets ( mg) by mouth at bedtime 180 tablet 0    VITAMIN D, CHOLECALCIFEROL, PO Take 2,000 Units by mouth daily         Medications from List 1 of the standing order (on medications that exclude the use of Paxlovid) that patient is taking: NONE. Is patient taking Sandra's Wort? No  Is patient taking Sandra's Wort or any meds from List 1? No.   Medications from List 2 of the standing order (on meds that provider needs to adjust) that patient is taking: NONE. Is patient on any of the meds from List 2? No.   Medications from List 3 of standing order (on meds that a RN needs to adjust) that patient is taking: trazodone (Desyrel): Instructed patient to stop taking trazodone while taking Paxlovid and restart trazodone 3 days after the completion of Paxlovid.  Is patient on any meds from List 3? No.     Paxlovid has an  approximate 90% reduction in hospitalization. Paxlovid can possibly cause altered sense of taste, diarrhea (loose, watery stools), high blood pressure, muscle aches.     Would patient like a Paxlovid prescription?   Yes.   Lab Results   Component Value Date    GFRESTIMATED >90 12/21/2023       Was last eGFR reduced? No, eGFR 60 or greater/ No Result on record. Patient can receive the normal renal function dose. Paxlovid Rx sent to [place] pharmacy   [facility]    Temporary change to home medications: None    All medication adjustments (holds, etc) were discussed with the patient and patient was asked to repeat back (teachback) their med adjustment.  Did patient understand med adjustment? Yes, patient repeated back and understood correctly.        Reviewed the following instructions with the patient:    Paxlovid (nimatrelvir and ritonavir)    How it works  Two medicines (nirmatrelvir and ritonavir) are taken together. They stop the virus from growing. Less amount of virus is easier for your body to fight.    How to take  Medicine comes in a daily container with both medicine tablets. Take by mouth twice daily (once in the morning, once at night) for 5 days.  The number of tablets to take varies by patient.  Don't chew or break capsules. Swallow whole.    When to take  Take as soon as possible after positive COVID-19 test result, and within 5 days of your first symptoms.    Possible side effects  Can cause altered sense of taste, diarrhea (loose, watery stools), high blood pressure, muscle aches.    Fadi Schreiber RN

## 2024-10-11 DIAGNOSIS — N95.1 INSOMNIA ASSOCIATED WITH MENOPAUSE: ICD-10-CM

## 2024-10-11 DIAGNOSIS — N95.1 VASOMOTOR SYMPTOMS DUE TO MENOPAUSE: ICD-10-CM

## 2024-10-14 RX ORDER — ESTRADIOL 0.03 MG/D
PATCH, EXTENDED RELEASE TRANSDERMAL
Qty: 16 PATCH | Refills: 4 | Status: SHIPPED | OUTPATIENT
Start: 2024-10-14

## 2024-10-14 NOTE — TELEPHONE ENCOUNTER
Requested Prescriptions   Pending Prescriptions Disp Refills    GLENN 0.025 MG/24HR bi-weekly patch [Pharmacy Med Name: GLENN 0.025MG/24HR PTTW] 16 patch 4     Sig: REMOVE OLD PATCH AND AND PLACE ONE PATCH ONTO THE SKIN TWICE A WEEK       Contraceptives Protocol Failed - 10/11/2024  8:16 PM        Failed - Medication indicated for associated diagnosis     Medication is associated with one or more of the following diagnoses:  Contraception  Acne  Dysmenorrhea  Menorrhagia  Amenorrhea  PCOS  Premenstrual Dysphoric Disorder  Irregular menses  Endometriosis          Passed - Patient is not a current smoker if age is 35 or older        Passed - Recent (12 mo) or future (30 days) visit within the authorizing provider's specialty     The patient must have completed an in-person or virtual visit within the past 12 months or has a future visit scheduled within the next 90 days with the authorizing provider s specialty.  Urgent care and e-visits do not quality as an office visit for this protocol.          Passed - Medication is active on med list        Passed - No active pregnancy on record        Passed - No positive pregnancy test in past 12 months       Hormone Replacement Therapy Failed - 10/11/2024  8:16 PM        Failed - Medication indicated for associated diagnosis     The medication is prescribed for one or more of the following conditions:    Menopause   Vulva/Vaginal atrophy   Low Estrogen   Gender Dysphoria   Male to Female transgender          Passed - Most recent blood pressure under 140/90 in past 12 months     BP Readings from Last 3 Encounters:   01/19/24 112/64   01/04/24 118/69   12/21/23 128/72       No data recorded            Passed - Medication is active on med list        Passed - Recent (12 mo) or future (90 days) visit within the authorizing provider's specialty     The patient must have completed an in-person or virtual visit within the past 12 months or has a future visit scheduled within the  next 90 days with the authorizing provider s specialty.  Urgent care and e-visits do not quality as an office visit for this protocol.          Passed - Patient is 18 years of age or older        Passed - No active pregnancy on record        Passed - No positive pregnancy test on record in past 12 months       Hormone Replacement Therapy (vaginal) Failed - 10/11/2024  8:16 PM        Failed - Medication indicated for associated diagnosis     Medication is associated with one or more of the following diagnoses:     Menopause   Vulva/Vaginal atrophy   UTI prophylaxis          Passed - Medication is active on med list        Passed - Recent (12 mo) or future (90 days) visit within the authorizing provider's specialty     The patient must have completed an in-person or virtual visit within the past 12 months or has a future visit scheduled within the next 90 days with the authorizing provider s specialty.  Urgent care and e-visits do not quality as an office visit for this protocol.          Passed - Patient is 18 years of age or older        Passed - No active pregnancy on record        Passed - No positive pregnancy test on record in past 12 months           Last Written Prescription Date:  1/4/24  Last Fill Quantity: 16,  # refills: 4   Last office visit: 1/4/2024 ; last virtual visit: Visit date not found with prescribing provider:  Alpa Rosenthal CNM   Future Office Visit:    None    Routing to provider to advise.  Kayli Horne RN on 10/14/2024 at 10:16 AM

## 2024-10-25 ENCOUNTER — OFFICE VISIT (OUTPATIENT)
Dept: DERMATOLOGY | Facility: CLINIC | Age: 49
End: 2024-10-25
Payer: COMMERCIAL

## 2024-10-25 DIAGNOSIS — Z12.83 ENCOUNTER FOR SCREENING FOR MALIGNANT NEOPLASM OF SKIN: Primary | ICD-10-CM

## 2024-10-25 DIAGNOSIS — L81.4 LENTIGINES: ICD-10-CM

## 2024-10-25 DIAGNOSIS — L82.1 SEBORRHEIC KERATOSES: ICD-10-CM

## 2024-10-25 DIAGNOSIS — L91.8 INFLAMED ACROCHORDON: ICD-10-CM

## 2024-10-25 DIAGNOSIS — D18.01 CHERRY ANGIOMA: ICD-10-CM

## 2024-10-25 DIAGNOSIS — D22.9 MULTIPLE NEVI: ICD-10-CM

## 2024-10-25 PROCEDURE — 99213 OFFICE O/P EST LOW 20 MIN: CPT | Mod: 25 | Performed by: PHYSICIAN ASSISTANT

## 2024-10-25 PROCEDURE — 11200 RMVL SKIN TAGS UP TO&INC 15: CPT | Performed by: PHYSICIAN ASSISTANT

## 2024-10-25 ASSESSMENT — PAIN SCALES - GENERAL: PAINLEVEL_OUTOF10: NO PAIN (0)

## 2024-10-25 NOTE — PROGRESS NOTES
[facility] Dermatology Note  Encounter Date: Oct 25, 2024  Office Visit     Reviewed patients past medical history and pertinent chart review prior to patients visit today.     Dermatology Problem List:  # Inflamed acrochordons  - cryotherapy     ____________________________________________    Assessment & Plan:     # Inflamed acrochordons x4  The benign nature of the skin lesion(s) was discussed with the patient.  Due to the inflamed and irritated nature of the lesions I recommend cryotherapy and the patient was agreeable.  Care reviewed.    Cryotherapy procedure note, location(s): left axilla x2, right axilla x2 After verbal consent and discussion of risks and benefits including, but not limited to, dyspigmentation/scar, blister, and pain, the lesion(s) was(were) treated with 1-2 mm freeze border for 1-2 cycles with liquid nitrogen. Post cryotherapy instructions were provided.    # Multiple nevi, trunk and extremities  # Solar lentigines  - No concerning features on dermoscopy. We discussed the importance of self exams at home. ABCDE criteria and importance of photoprotection reviewed.     # Cherry angiomas  # Seborrheic keratoses  # Fibrous papule, left nasal sidewall   - We discussed the benign nature of the skin lesions. No treatment required. Continued observation recommended. Follow up with any concerns.      Follow-up:  Annual for follow up full body skin exam, as needed for new or changing lesions or new concerns    All risks, benefits and alternatives were discussed with patient.  Patient is in agreement and understands the assessment and plan.  All questions were answered.  Maria E Martinez PA-C  [facility] Dermatology    ____________________________________________    CC: Derm Problem (FBSE - skin tag on R side, few spots on face)    HPI:  Ms. Aditi Osuna is a(n) 49 year old female who presents today as a return patient for a full body skin cancer screening. The patient  requests evaluation of bothersome skin tags in the axilla. The lesions rub against clothing. Second, she notes a few spots on the face that have been present for years. No pain, itching, or bleeding at the sites. No other specific cutaneous concerns today. The patient reports trying to be diligent with photoprotection.      Physical Exam:  Vitals: There were no vitals taken for this visit.  SKIN: Total skin excluding the genitalia areas was performed. The exam included the scalp, face, neck, bilateral arms, chest, back, abdomen, bilateral legs, digits, mons pubis, buttocks, and nails.   - Sinha II.  - The nasal tip demonstrates a 1 mm homogenous skin colored papule, consistent with a fibrous papule.  - Multiple tan/brown macules and papules scattered throughout exam, consistent with benign nevi. No concerning features on dermoscopy.   - Scattered tan, homogenous macules scattered on sun exposed skin, consistent with solar lentigines.   - Scattered waxy, stuck on appearing papules and patches, consistent with seborrheic keratoses.    - Several 1-2 mm red dome shaped symmetric papules, consistent with cherry angiomas.     Medications:  Current Outpatient Medications   Medication Sig Dispense Refill    Azelastine HCl 137 MCG/SPRAY SOLN USE ONE SPRAY IN EACH NOSTRIL EVERY NIGHT AT BEDTIME 90 mL 2    cetirizine (ZYRTEC) 10 MG tablet TAKE ONE TABLET BY MOUTH ONCE DAILY 90 tablet 3    GLENN 0.025 MG/24HR bi-weekly patch REMOVE OLD PATCH AND AND PLACE ONE PATCH ONTO THE SKIN TWICE A WEEK 16 patch 4    fluticasone (FLONASE) 50 MCG/ACT nasal spray USE TWO SPRAYS IN EACH NOSTRIL ONCE DAILY 16 g 0    glucosamine-chondroitin 500-400 MG CAPS Take 1 capsule by mouth daily      hydrOXYzine (VISTARIL) 50 MG capsule Take 1-2 capsules ( mg) by mouth nightly as needed for other (sleep) 180 capsule 3    multivitamin w/minerals (THERA-VIT-M) tablet Take 1 tablet by mouth daily      sertraline (ZOLOFT) 100 MG tablet TAKE ONE  TABLET BY MOUTH ONCE DAILY 90 tablet 3    SUDOGEST MAXIMUM STRENGTH 30 MG tablet TAKE ONE TABLET BY MOUTH ONCE DAILY 48 tablet 3    traZODone (DESYREL) 50 MG tablet Take 1-2 tablets ( mg) by mouth at bedtime 180 tablet 0    VITAMIN D, CHOLECALCIFEROL, PO Take 2,000 Units by mouth daily       No current facility-administered medications for this visit.      Past Medical History:   Patient Active Problem List   Diagnosis    IUD (intrauterine device) in place    Insomnia, unspecified type    Screening for cervical cancer    Encounter for IUD insertion     Past Medical History:   Diagnosis Date    Anxiety     Encounter for IUD insertion 01/19/2024    mirena IUD inserted 1/19/24 due for removal 1/19/2032    Night terrors        CC Referred Self, MD  No address on file on close of this encounter.

## 2024-10-25 NOTE — NURSING NOTE
Dermatology Rooming Note    Aditi Osuna's goals for this visit include:   Chief Complaint   Patient presents with    Derm Problem     FBSE - skin tag on R side, few spots on face     Kristin Sen, EMT

## 2024-10-25 NOTE — LETTER
10/25/2024       RE: dAiti Osuna  [address]  [facility] [address]     Dear Colleague,    Thank you for referring your patient, Aditi Osuna, to the [facility] [place] [facility]. Please see a copy of my visit note below.    [facility] Dermatology Note  Encounter Date: Oct 25, 2024  Office Visit     Reviewed patients past medical history and pertinent chart review prior to patients visit today.     Dermatology Problem List:  # Inflamed acrochordons  - cryotherapy     ____________________________________________    Assessment & Plan:     # Inflamed acrochordons x4  The benign nature of the skin lesion(s) was discussed with the patient.  Due to the inflamed and irritated nature of the lesions I recommend cryotherapy and the patient was agreeable.  Care reviewed.    Cryotherapy procedure note, location(s): left axilla x2, right axilla x2 After verbal consent and discussion of risks and benefits including, but not limited to, dyspigmentation/scar, blister, and pain, the lesion(s) was(were) treated with 1-2 mm freeze border for 1-2 cycles with liquid nitrogen. Post cryotherapy instructions were provided.    # Multiple nevi, trunk and extremities  # Solar lentigines  - No concerning features on dermoscopy. We discussed the importance of self exams at home. ABCDE criteria and importance of photoprotection reviewed.     # Cherry angiomas  # Seborrheic keratoses  # Fibrous papule, left nasal sidewall   - We discussed the benign nature of the skin lesions. No treatment required. Continued observation recommended. Follow up with any concerns.      Follow-up:  Annual for follow up full body skin exam, as needed for new or changing lesions or new concerns    All risks, benefits and alternatives were discussed with patient.  Patient is in agreement and understands the assessment and plan.  All questions were  answered.  Maria E Martinez PA-C  [facility] Dermatology    ____________________________________________    CC: Derm Problem (FBSE - skin tag on R side, few spots on face)    HPI:  Ms. Aditi Osuna is a(n) 49 year old female who presents today as a return patient for a full body skin cancer screening. The patient requests evaluation of bothersome skin tags in the axilla. The lesions rub against clothing. Second, she notes a few spots on the face that have been present for years. No pain, itching, or bleeding at the sites. No other specific cutaneous concerns today. The patient reports trying to be diligent with photoprotection.      Physical Exam:  Vitals: There were no vitals taken for this visit.  SKIN: Total skin excluding the genitalia areas was performed. The exam included the scalp, face, neck, bilateral arms, chest, back, abdomen, bilateral legs, digits, mons pubis, buttocks, and nails.   - Sinha II.  - The nasal tip demonstrates a 1 mm homogenous skin colored papule, consistent with a fibrous papule.  - Multiple tan/brown macules and papules scattered throughout exam, consistent with benign nevi. No concerning features on dermoscopy.   - Scattered tan, homogenous macules scattered on sun exposed skin, consistent with solar lentigines.   - Scattered waxy, stuck on appearing papules and patches, consistent with seborrheic keratoses.    - Several 1-2 mm red dome shaped symmetric papules, consistent with cherry angiomas.     Medications:  Current Outpatient Medications   Medication Sig Dispense Refill     Azelastine HCl 137 MCG/SPRAY SOLN USE ONE SPRAY IN EACH NOSTRIL EVERY NIGHT AT BEDTIME 90 mL 2     cetirizine (ZYRTEC) 10 MG tablet TAKE ONE TABLET BY MOUTH ONCE DAILY 90 tablet 3     GLENN 0.025 MG/24HR bi-weekly patch REMOVE OLD PATCH AND AND PLACE ONE PATCH ONTO THE SKIN TWICE A WEEK 16 patch 4     fluticasone (FLONASE) 50 MCG/ACT nasal spray USE TWO SPRAYS IN EACH NOSTRIL ONCE DAILY 16 g 0      glucosamine-chondroitin 500-400 MG CAPS Take 1 capsule by mouth daily       hydrOXYzine (VISTARIL) 50 MG capsule Take 1-2 capsules ( mg) by mouth nightly as needed for other (sleep) 180 capsule 3     multivitamin w/minerals (THERA-VIT-M) tablet Take 1 tablet by mouth daily       sertraline (ZOLOFT) 100 MG tablet TAKE ONE TABLET BY MOUTH ONCE DAILY 90 tablet 3     SUDOGEST MAXIMUM STRENGTH 30 MG tablet TAKE ONE TABLET BY MOUTH ONCE DAILY 48 tablet 3     traZODone (DESYREL) 50 MG tablet Take 1-2 tablets ( mg) by mouth at bedtime 180 tablet 0     VITAMIN D, CHOLECALCIFEROL, PO Take 2,000 Units by mouth daily       No current facility-administered medications for this visit.      Past Medical History:   Patient Active Problem List   Diagnosis     IUD (intrauterine device) in place     Insomnia, unspecified type     Screening for cervical cancer     Encounter for IUD insertion     Past Medical History:   Diagnosis Date     Anxiety      Encounter for IUD insertion 01/19/2024    mirena IUD inserted 1/19/24 due for removal 1/19/2032     Night terrors        CC Referred Self, MD  No address on file on close of this encounter.      Again, thank you for allowing me to participate in the care of your patient.      Sincerely,    Maria E Martinez PA-C

## 2024-10-28 DIAGNOSIS — Z91.09 ENVIRONMENTAL ALLERGIES: ICD-10-CM

## 2024-10-28 RX ORDER — FLUTICASONE PROPIONATE 50 MCG
2 SPRAY, SUSPENSION (ML) NASAL DAILY
Qty: 16 ML | Refills: 0 | Status: SHIPPED | OUTPATIENT
Start: 2024-10-28

## 2024-11-11 ASSESSMENT — SLEEP AND FATIGUE QUESTIONNAIRES
HOW LIKELY ARE YOU TO NOD OFF OR FALL ASLEEP WHILE SITTING QUIETLY AFTER LUNCH WITHOUT ALCOHOL: WOULD NEVER DOZE
HOW LIKELY ARE YOU TO NOD OFF OR FALL ASLEEP IN A CAR, WHILE STOPPED FOR A FEW MINUTES IN TRAFFIC: WOULD NEVER DOZE
HOW LIKELY ARE YOU TO NOD OFF OR FALL ASLEEP WHEN YOU ARE A PASSENGER IN A CAR FOR AN HOUR WITHOUT A BREAK: SLIGHT CHANCE OF DOZING
HOW LIKELY ARE YOU TO NOD OFF OR FALL ASLEEP WHILE SITTING AND TALKING TO SOMEONE: WOULD NEVER DOZE
HOW LIKELY ARE YOU TO NOD OFF OR FALL ASLEEP WHILE SITTING AND READING: WOULD NEVER DOZE
HOW LIKELY ARE YOU TO NOD OFF OR FALL ASLEEP WHILE WATCHING TV: WOULD NEVER DOZE
HOW LIKELY ARE YOU TO NOD OFF OR FALL ASLEEP WHILE LYING DOWN TO REST IN THE AFTERNOON WHEN CIRCUMSTANCES PERMIT: WOULD NEVER DOZE
HOW LIKELY ARE YOU TO NOD OFF OR FALL ASLEEP WHILE SITTING INACTIVE IN A PUBLIC PLACE: WOULD NEVER DOZE

## 2024-11-14 ENCOUNTER — VIRTUAL VISIT (OUTPATIENT)
Dept: SLEEP MEDICINE | Facility: CLINIC | Age: 49
End: 2024-11-14
Payer: COMMERCIAL

## 2024-11-14 VITALS — BODY MASS INDEX: 25.11 KG/M2 | WEIGHT: 160 LBS | HEIGHT: 67 IN

## 2024-11-14 DIAGNOSIS — G47.21 CIRCADIAN RHYTHM SLEEP DISORDER, DELAYED SLEEP PHASE TYPE: ICD-10-CM

## 2024-11-14 DIAGNOSIS — F51.04 CHRONIC INSOMNIA: Primary | ICD-10-CM

## 2024-11-14 PROCEDURE — 90832 PSYTX W PT 30 MINUTES: CPT | Mod: 95 | Performed by: PSYCHOLOGIST

## 2024-11-14 ASSESSMENT — PAIN SCALES - GENERAL: PAINLEVEL_OUTOF10: NO PAIN (0)

## 2024-11-14 NOTE — NURSING NOTE
Current patient location: [address]  [facility] [address]    Is the patient currently in the state of [place]? YES    Visit mode:VIDEO    If the visit is dropped, the patient can be reconnected by:VIDEO VISIT: Send to e-mail at: [email or web address]    Will anyone else be joining the visit? NO  (If patient encounters technical issues they should call [phone]    Are changes needed to the allergy or medication list? No    Are refills needed on medications prescribed by this physician? NO    Rooming Documentation:  Questionnaire(s) completed    Reason for visit: RECHECK    Himanshu CLARKF

## 2024-11-14 NOTE — PROGRESS NOTES
Visit Start Time: 3:16 PM  Visit End Time: 3:34 PM        SLEEP MEDICINE VIRTUAL FOLLOW-UP VISIT  Behavioral Sleep Medicine    Patient Name: Aditi Osuna  MRN:  [id number]  Date of Service: 2024       Subjective Report     Aditi Osuna  returns for a telehealth video visit to discuss progress in implementing behavioral strategies for the management of insomnia.  Patient consent for initiation of video visit was obtained and documented prior to initiation of visit.     Aditi reports she has been able to reduce her hydroxyzine to 50 mg.  Melatonin is 2 mg.  She reports improvement in sleep quality and reduction in sleep latency.  She now estimates it takes her about 30 minutes to fall asleep.  She is adjusted her wake time from 6 AM to 6:30 AM.  She indicates that her work and life schedule will allow for this.  Discussed morning grogginess and differential between sleep inertia versus carryover effects of her to sedating sleep medication.  She will be discussing use of medication and her individual goal to taper down or off of one of the 2 medications as a next step.     Initial Recommendations and Counselin.  Establish a consistent wake time of 6 AM using an alarm placed away from your bed.     2.  At your rise time, utilize your bright light box for 30 minutes per recommended specifications in terms of distance from your eyes.  Avoid looking into the light directly link your use of light boxes with a morning routine such as morning coffee, breakfast, using her laptop etc.     3.  Consider revising your dose of melatonin to match evidence-based use in individuals with delayed sleep phase circadian rhythm.  The recommended typical doses between 0.5-1 mg taken 4-5 hours before desired bedtime.  In your case taking around 7 PM should be sufficient.  Confer with your doctor if you have any questions of about use of melatonin.   4.  Take your other prescription sleep medication    ons as  directed by your physician.     5.  Discontinue use of cell phone, and screens/devices by 8:30 PM.  It is okay to watch TV, in a dark room, had a distance of at least 8 feet.  Recommend you put television on bluelight blocking setting if it has not or reduce the brightness level.     6.  Your earliest recommended time to bed is 10:30 PM.       Sleep Data:     Source of Sleep Estimates:  Verbal Self-report    Average Time in Bed: 8.5 hrs.  Average Total Sleep Time: 8 hrs  Sleep Efficiency estimate: Greater than 85%          Insomnia Severity Index    Difficulty falling asleep (Patient-Rptd) 1    Difficulty staying asleep (Patient-Rptd) 1    Problems waking up too early (Patient-Rptd) 0    How SATISFIED/DISSATISFIED are you with your CURRENT sleep pattern? (Patient-Rptd) 1    How NOTICEABLE to others do you think your sleep problem is in terms of impairing the quality of your life? (Patient-Rptd) 1    How WORRIED/DISTRESSED are you about your current sleep problem? (Patient-Rptd) 1    To what extent do you consider your sleep problem to INTERFERE with your daily functioning (e.g. daytime fatigue, mood, ability to function at work/daily chores, concentration, memory, mood, etc.) CURRENTLY? (Patient-Rptd) 1    Total Score (Patient-Rptd) 6           Interventions     Strategies and recommendations including  tapering medication under the medical supervision, Stimulus control therapy, Sleep restriction therapy, and Cognitive restructuring strategies insomnia were reviewed and discussed today.     Services provided are compliant with the requirements of [place] Statute SS 256B.0625 Subd. 3b and paragraph (b)       Vital Signs     There were no vitals taken for this visit.     Mental Status     Orientation:  X3  Mood:  normal  Affect:  Congruent with mood  Speech/Language:  Normal  Thought Process: Intact  Associations:  Normal  Thought Content: Normal  Patient does not report any suicidal ideation, intention or  plan.    Diagnostic Impressions and Plan        Chronic insomnia  Circadian rhythm sleep disorder, delayed sleep phase type    Patient reports significant improvement in overall sleep quality.  Insomnia symptoms now within the normal range while patient is also gradually reducing dose of hydroxyzine from 100 mg to 50 mg.  She has reduced dose of melatonin to 2 mg closer to evidence-based dosage for delayed sleep phase circadian rhythm tendency.  She is using her light box as recommended and overall sleep latency and middle insomnia has been resolved.    Plan:   Patient plans to follow-up with her primary care physician to discuss her next school which she states is continued reduction of 1 of 2 of her sleep medications, either trazodone or hydroxyzine.  Follow-up with behavioral sleep medicine in 2 months.    Follow-up: 2 months      Isaiah Santos PsyD, REBECCA, [facility]  Diplomate, Behavioral Sleep Medicine  [facility]      Note: This dictation was created using voice recognition software. This document may contain an error not identified before finalizing the document. If the error changes the accuracy of the document, I would appreciate it being brought to my attention.

## 2024-11-23 ENCOUNTER — E-VISIT (OUTPATIENT)
Dept: URGENT CARE | Facility: CLINIC | Age: 49
End: 2024-11-23
Payer: COMMERCIAL

## 2024-11-23 DIAGNOSIS — J06.9 ACUTE UPPER RESPIRATORY INFECTION, UNSPECIFIED: Primary | ICD-10-CM

## 2024-11-23 RX ORDER — GUAIFENESIN 1200 MG/1
1200 TABLET, EXTENDED RELEASE ORAL 2 TIMES DAILY
Qty: 60 TABLET | Refills: 0 | Status: SHIPPED | OUTPATIENT
Start: 2024-11-23

## 2024-11-23 RX ORDER — BENZONATATE 100 MG/1
100 CAPSULE ORAL 3 TIMES DAILY PRN
Qty: 30 CAPSULE | Refills: 0 | Status: SHIPPED | OUTPATIENT
Start: 2024-11-23

## 2024-11-23 NOTE — PATIENT INSTRUCTIONS
Viral Respiratory Infection: Care Instructions  Overview     A viral respiratory infection is an infection of the nose, sinuses, or throat caused by a virus. Colds and the flu are common types of viral respiratory infections.  The symptoms of a viral respiratory infection often start quickly. They include a fever, sore throat, and runny nose. You may also just not feel well. Or you may not want to eat much.  Most viral infections can be treated with home care. This may include drinking lots of fluids and taking over-the-counter pain medicine. You will probably feel better in 4 to 10 days.  Antibiotics are not used to treat a viral infection. Antibiotics don't kill viruses, so they won't help cure a viral illness.  In some cases, a doctor may prescribe antiviral medicine to help your body fight a serious viral infection.  Follow-up care is a key part of your treatment and safety. Be sure to make and go to all appointments, and call your doctor if you are having problems. It's also a good idea to know your test results and keep a list of the medicines you take.  How can you care for yourself at home?  To prevent dehydration, drink plenty of fluids. Choose water and other clear liquids until you feel better. If you have kidney, heart, or liver disease and have to limit fluids, talk with your doctor before you increase the amount of fluids you drink.  Ask your doctor if you can take an over-the-counter pain medicine, such as acetaminophen (Tylenol), ibuprofen (Advil, Motrin), or naproxen (Aleve). Be safe with medicines. Read and follow all instructions on the label. No one younger than 20 should take aspirin. It has been linked to Reye syndrome, a serious illness.  Be careful when taking over-the-counter cold or flu medicines and Tylenol at the same time. Many of these medicines have acetaminophen, which is Tylenol. Read the labels to make sure that you are not taking more than the recommended dose. Too much  "acetaminophen (Tylenol) can be harmful.  Get plenty of rest.  Use saline (saltwater) nasal washes to help keep your nasal passages open and wash out mucus and allergens. You can buy saline nose sprays at a grocery store or drugstore. Follow the instructions on the package. Or you can make your own at home. Add 1 teaspoon of non-iodized salt and 1 teaspoon of baking soda to 2 cups of distilled or boiled and cooled water. Fill a squeeze bottle or neti pot with the nasal wash. Then put the tip into your nostril, and lean over the sink. With your mouth open, gently squirt the liquid. Repeat on the other side.  Use a vaporizer or humidifier to add moisture to your bedroom. Follow the instructions for cleaning the machine.  Do not smoke or allow others to smoke around you. If you need help quitting, talk to your doctor about stop-smoking programs and medicines. These can increase your chances of quitting for good.  When should you call for help?   Call 911 anytime you think you may need emergency care. For example, call if:    You have severe trouble breathing.   Call your doctor now or seek immediate medical care if:    You have a new or higher fever.     Your fever lasts more than 48 hours.     You have trouble breathing.     You have a fever with a stiff neck or a severe headache.     You are sensitive to light.     You feel very sleepy or confused.   Watch closely for changes in your health, and be sure to contact your doctor if:    You do not get better as expected.   Where can you learn more?  Go to https://www.[employer] | [employer].net/patiented  Enter Q795 in the search box to learn more about \"Viral Respiratory Infection: Care Instructions.\"  Current as of: June 12, 2023  Content Version: 14.2 2024 [facility] [employer].   Care instructions adapted under license by your healthcare professional. If you have questions about a medical condition or this instruction, always ask your healthcare professional. [employer] " [employer] disclaims any warranty or liability for your use of this information.    Thank you for choosing us for your care.       The symptoms you describe suggest a viral cause, which is much more common than a bacterial cause. Antibiotics will treat bacterial infections, but have no effect on viral infections. If possible, especially if improving, start with symptom care for the first 7-10 days, then consider seeking further treatment or taking an antibiotic. Bacterial infections generally are more severe, including symptoms such as pus, fever over 101degrees F, or rapidly worsening.  You may want to try a nasal lavage (also known as nasal irrigation). You can find over-the-counter products, such as Neti-Pot, at retail locations or make your own at home. Instructions for homemade nasal lavage and more information on the process are available online at http://www.aafp.org/afp/2009/1115/p1121.html.     If you're not feeling better within 5-7 days, please schedule an appointment.  You can schedule an appointment right here in [facility], or call [phone]  If the visit is for the same symptoms as your eVisit, we'll refund the cost of your eVisit if seen within seven days.      I did sent in an rx for Mucinx which is over the counter and a cough suppressant called Tessalon to help with symptom management.

## 2024-12-03 ENCOUNTER — ANCILLARY PROCEDURE (OUTPATIENT)
Dept: MAMMOGRAPHY | Facility: CLINIC | Age: 49
End: 2024-12-03
Attending: NURSE PRACTITIONER
Payer: COMMERCIAL

## 2024-12-03 DIAGNOSIS — Z12.31 VISIT FOR SCREENING MAMMOGRAM: ICD-10-CM

## 2024-12-03 PROCEDURE — 77063 BREAST TOMOSYNTHESIS BI: CPT | Mod: 26

## 2024-12-03 PROCEDURE — 77063 BREAST TOMOSYNTHESIS BI: CPT

## 2024-12-03 PROCEDURE — 77067 SCR MAMMO BI INCL CAD: CPT | Mod: 26

## 2024-12-03 PROCEDURE — 77067 SCR MAMMO BI INCL CAD: CPT

## 2024-12-26 SDOH — HEALTH STABILITY: PHYSICAL HEALTH: ON AVERAGE, HOW MANY DAYS PER WEEK DO YOU ENGAGE IN MODERATE TO STRENUOUS EXERCISE (LIKE A BRISK WALK)?: 0 DAYS

## 2024-12-26 SDOH — HEALTH STABILITY: PHYSICAL HEALTH: ON AVERAGE, HOW MANY MINUTES DO YOU ENGAGE IN EXERCISE AT THIS LEVEL?: 0 MIN

## 2024-12-26 ASSESSMENT — SOCIAL DETERMINANTS OF HEALTH (SDOH): HOW OFTEN DO YOU GET TOGETHER WITH FRIENDS OR RELATIVES?: ONCE A WEEK

## 2024-12-30 ENCOUNTER — OFFICE VISIT (OUTPATIENT)
Dept: FAMILY MEDICINE | Facility: CLINIC | Age: 49
End: 2024-12-30
Payer: COMMERCIAL

## 2024-12-30 VITALS
HEART RATE: 70 BPM | RESPIRATION RATE: 15 BRPM | BODY MASS INDEX: 25.43 KG/M2 | TEMPERATURE: 98 F | SYSTOLIC BLOOD PRESSURE: 120 MMHG | HEIGHT: 67 IN | DIASTOLIC BLOOD PRESSURE: 78 MMHG | OXYGEN SATURATION: 100 % | WEIGHT: 162 LBS

## 2024-12-30 DIAGNOSIS — N95.1 PERIMENOPAUSE: ICD-10-CM

## 2024-12-30 DIAGNOSIS — Z00.00 ROUTINE GENERAL MEDICAL EXAMINATION AT A HEALTH CARE FACILITY: Primary | ICD-10-CM

## 2024-12-30 DIAGNOSIS — Z11.4 SCREENING FOR HIV (HUMAN IMMUNODEFICIENCY VIRUS): ICD-10-CM

## 2024-12-30 DIAGNOSIS — Z13.220 LIPID SCREENING: ICD-10-CM

## 2024-12-30 DIAGNOSIS — Z12.4 CERVICAL CANCER SCREENING: ICD-10-CM

## 2024-12-30 DIAGNOSIS — Z11.59 NEED FOR HEPATITIS C SCREENING TEST: ICD-10-CM

## 2024-12-30 DIAGNOSIS — G47.00 INSOMNIA, UNSPECIFIED TYPE: ICD-10-CM

## 2024-12-30 DIAGNOSIS — Z91.09 ENVIRONMENTAL ALLERGIES: ICD-10-CM

## 2024-12-30 PROCEDURE — 87624 HPV HI-RISK TYP POOLED RSLT: CPT | Performed by: NURSE PRACTITIONER

## 2024-12-30 PROCEDURE — 99396 PREV VISIT EST AGE 40-64: CPT | Performed by: NURSE PRACTITIONER

## 2024-12-30 RX ORDER — TRAZODONE HYDROCHLORIDE 50 MG/1
50-100 TABLET, FILM COATED ORAL AT BEDTIME
Qty: 180 TABLET | Refills: 1 | Status: SHIPPED | OUTPATIENT
Start: 2024-12-30

## 2024-12-30 RX ORDER — HYDROXYZINE PAMOATE 25 MG/1
25-100 CAPSULE ORAL
Qty: 360 CAPSULE | Refills: 1 | Status: SHIPPED | OUTPATIENT
Start: 2024-12-30

## 2024-12-30 RX ORDER — AZELASTINE HYDROCHLORIDE 137 UG/1
1 SPRAY, METERED NASAL DAILY
Qty: 90 ML | Refills: 3 | Status: SHIPPED | OUTPATIENT
Start: 2024-12-30

## 2024-12-30 RX ORDER — FLUTICASONE PROPIONATE 50 MCG
2 SPRAY, SUSPENSION (ML) NASAL DAILY
Qty: 16 ML | Refills: 3 | Status: SHIPPED | OUTPATIENT
Start: 2024-12-30

## 2024-12-30 ASSESSMENT — PAIN SCALES - GENERAL: PAINLEVEL_OUTOF10: NO PAIN (0)

## 2024-12-30 NOTE — PROGRESS NOTES
Preventive Care Visit  [facility]  NITIN Mendez CNP, Family Medicine  Dec 30, 2024      ICD-10-CM    1. Routine general medical examination at a health care facility  Z00.00       2. Cervical cancer screening  Z12.4 HPV and Gynecologic Cytology Panel - Recommended Age 30-65 Years      3. Screening for HIV (human immunodeficiency virus)  Z11.4       4. Need for hepatitis C screening test  Z11.59       5. Insomnia, unspecified type  G47.00 hydrOXYzine (VISTARIL) 25 MG capsule     traZODone (DESYREL) 50 MG tablet      6. Perimenopause  N95.1       7. Environmental allergies  Z91.09 azelastine 137 MCG/SPRAY SOLN     fluticasone (FLONASE) 50 MCG/ACT nasal spray      8. Lipid screening  Z13.220 Lipid panel reflex to direct LDL Fasting            Subjective   Aditi is a 49 year old, presenting for the following:  Physical        12/30/2024    11:30 AM   Additional Questions   Roomed by Yina   Accompanied by Self          HPI  Mood has been stable  She has been able to reduce her use of medications for insomnia; has been seeing sleep psychology  Perimenopause symptoms stable; feels like HRT working well.        12/26/2024   General Health   How would you rate your overall physical health? Good   Feel stress (tense, anxious, or unable to sleep) Only a little   (!) STRESS CONCERN      12/26/2024   Nutrition   Three or more servings of calcium each day? Yes   Diet: Regular (no restrictions)   How many servings of fruit and vegetables per day? 4 or more   How many sweetened beverages each day? 0-1         12/26/2024   Exercise   Days per week of moderate/strenous exercise 0 days   Average minutes spent exercising at this level 0 min   (!) EXERCISE CONCERN      12/26/2024   Social Factors   Frequency of gathering with friends or relatives Once a week   Worry food won't last until get money to buy more No   Food not last or not have enough money for food? No   Do you have  housing? (Housing is defined as stable permanent housing and does not include staying ouside in a car, in a tent, in an abandoned building, in an overnight shelter, or couch-surfing.) Yes   Are you worried about losing your housing? No   Lack of transportation? No   Unable to get utilities (heat,electricity)? No         12/26/2024   Fall Risk   Fallen 2 or more times in the past year? No   Trouble with walking or balance? No          12/26/2024   Dental   Dentist two times every year? Yes         12/26/2024   TB Screening   Were you born outside of the [place]? No           12/26/2024   Substance Use   Alcohol more than 3/day or more than 7/wk Not Applicable   Do you use any other substances recreationally? No     Social History     Tobacco Use    Smoking status: Never     Passive exposure: Never    Smokeless tobacco: Never   Vaping Use    Vaping status: Never Used   Substance Use Topics    Alcohol use: Yes    Drug use: No           12/3/2024   LAST FHS-7 RESULTS   1st degree relative breast or ovarian cancer No   Any relative bilateral breast cancer No   Any male have breast cancer No   Any ONE woman have BOTH breast AND ovarian cancer No   Any woman with breast cancer before 50yrs No   2 or more relatives with breast AND/OR ovarian cancer No   2 or more relatives with breast AND/OR bowel cancer No                  12/26/2024   One time HIV Screening   Previous HIV test? No         12/26/2024   STI Screening   New sexual partner(s) since last STI/HIV test? No     History of abnormal Pap smear: No - age 30- 64 PAP with HPV every 5 years recommended        Latest Ref Rng & Units 11/17/2021     9:24 AM 9/1/2017    12:00 AM 8/25/2016    12:00 AM   PAP / HPV   PAP  Negative for Intraepithelial Lesion or Malignancy (NILM)      HPV 16 DNA Negative Negative      HPV 18 DNA Negative Negative      Other HR HPV Negative Negative      PAP-ABSTRACT   See Scanned Document     See Scanned Document           This result is from an  "external source.     ASCVD Risk   The 10-year ASCVD risk score (Tonya FRANCO, et al., 2019) is: 1%    Values used to calculate the score:      Age: 49 years      Sex: Female      Is Non- : No      Diabetic: No      Tobacco smoker: No      Systolic Blood Pressure: 120 mmHg      Is BP treated: No      HDL Cholesterol: 60 mg/dL      Total Cholesterol: 192 mg/dL    Fracture Risk Assessment Tool  Link to Frax Calculator  Use the information below to complete the Frax calculator  : 1975  Sex: female  Weight (kg): 73.5 kg (actual weight)  Height (cm): 170.2 cm  Previous Fragility Fracture:  No  History of parent with fractured hip:  No  Current Smoking:  No  Patient has been on glucocorticoids for more than 3 months (5mg/day or more): No  Rheumatoid Arthritis on Problem List:  No  Secondary Osteoporosis on Problem List:  No  Consumes 3 or more units of alcohol per day: No  Femoral Neck BMD (g/cm2)           Reviewed and updated as needed this visit by Provider                    Lab work is in process  Labs reviewed in EPIC      Review of Systems  Constitutional, HEENT, cardiovascular, pulmonary, GI, , musculoskeletal, neuro, skin, endocrine and psych systems are negative, except as otherwise noted.     Objective    Exam  /78   Pulse 70   Temp 98  F (36.7  C) (Temporal)   Resp 15   Ht 1.702 m (5' 7\")   Wt 73.5 kg (162 lb)   LMP 2024 (Exact Date)   SpO2 100%   BMI 25.37 kg/m     Estimated body mass index is 25.37 kg/m  as calculated from the following:    Height as of this encounter: 1.702 m (5' 7\").    Weight as of this encounter: 73.5 kg (162 lb).    Physical Exam  GENERAL: alert and no distress  EYES: Eyes grossly normal to inspection, PERRL and conjunctivae and sclerae normal  NECK: no adenopathy, no asymmetry, masses, or scars  RESP: lungs clear to auscultation - no rales, rhonchi or wheezes  CV: regular rate and rhythm, normal S1 S2, no S3 or S4, no murmur, " click or rub, no peripheral edema  ABDOMEN: soft, nontender, no hepatosplenomegaly, no masses and bowel sounds normal  MS: no gross musculoskeletal defects noted, no edema  SKIN: no suspicious lesions or rashes  NEURO: Normal strength and tone, mentation intact and speech normal  PSYCH: mentation appears normal, affect normal/bright        Signed Electronically by: NITIN Mendez CNP

## 2024-12-31 LAB
HPV HR 12 DNA CVX QL NAA+PROBE: NEGATIVE
HPV16 DNA CVX QL NAA+PROBE: NEGATIVE
HPV18 DNA CVX QL NAA+PROBE: NEGATIVE
HUMAN PAPILLOMA VIRUS FINAL DIAGNOSIS: NORMAL

## 2025-01-06 LAB
BKR AP ASSOCIATED HPV REPORT: NORMAL
BKR LAB AP GYN ADEQUACY: NORMAL
BKR LAB AP GYN INTERPRETATION: NORMAL
BKR LAB AP LMP: NORMAL
BKR LAB AP PREVIOUS ABNORMAL: NORMAL
PATH REPORT.COMMENTS IMP SPEC: NORMAL
PATH REPORT.COMMENTS IMP SPEC: NORMAL
PATH REPORT.RELEVANT HX SPEC: NORMAL

## 2025-01-07 ENCOUNTER — LAB (OUTPATIENT)
Dept: LAB | Facility: CLINIC | Age: 50
End: 2025-01-07
Payer: COMMERCIAL

## 2025-01-07 DIAGNOSIS — Z13.220 LIPID SCREENING: ICD-10-CM

## 2025-01-07 LAB
CHOLEST SERPL-MCNC: 221 MG/DL
FASTING STATUS PATIENT QL REPORTED: YES
HDLC SERPL-MCNC: 57 MG/DL
LDLC SERPL CALC-MCNC: 138 MG/DL
NONHDLC SERPL-MCNC: 164 MG/DL
TRIGL SERPL-MCNC: 128 MG/DL

## 2025-01-07 PROCEDURE — 36415 COLL VENOUS BLD VENIPUNCTURE: CPT

## 2025-01-07 PROCEDURE — 80061 LIPID PANEL: CPT

## 2025-05-25 DIAGNOSIS — Z91.09 ENVIRONMENTAL ALLERGIES: ICD-10-CM

## 2025-05-26 DIAGNOSIS — Z91.09 ENVIRONMENTAL ALLERGIES: ICD-10-CM

## 2025-05-27 RX ORDER — FLUTICASONE PROPIONATE 50 MCG
2 SPRAY, SUSPENSION (ML) NASAL DAILY
Refills: 3 | OUTPATIENT
Start: 2025-05-27

## 2025-05-27 RX ORDER — FLUTICASONE PROPIONATE 50 MCG
2 SPRAY, SUSPENSION (ML) NASAL DAILY
Qty: 15.8 ML | Refills: 3 | Status: SHIPPED | OUTPATIENT
Start: 2025-05-27

## 2025-05-27 RX ORDER — PSEUDOEPHEDRINE HCL 30 MG/1
30 TABLET, FILM COATED ORAL DAILY
Qty: 48 TABLET | Refills: 3 | Status: SHIPPED | OUTPATIENT
Start: 2025-05-27

## 2025-06-12 NOTE — TELEPHONE ENCOUNTER
DIAGNOSIS: pain on my inner right elbow for the last couple of months. Tender to touch.    APPOINTMENT DATE: 6/19/25   NOTES STATUS DETAILS   OFFICE NOTE from referring provider N/A Self Referral    MEDICATION LIST Internal

## 2025-06-19 ENCOUNTER — PRE VISIT (OUTPATIENT)
Dept: ORTHOPEDICS | Facility: CLINIC | Age: 50
End: 2025-06-19

## 2025-06-19 ENCOUNTER — OFFICE VISIT (OUTPATIENT)
Dept: ORTHOPEDICS | Facility: CLINIC | Age: 50
End: 2025-06-19
Payer: COMMERCIAL

## 2025-06-19 DIAGNOSIS — M77.01 MEDIAL EPICONDYLITIS, RIGHT ELBOW: Primary | ICD-10-CM

## 2025-06-19 NOTE — PROGRESS NOTES
Sports Medicine Clinic Visit    PCP: Rebekah Antunez Thao is a 49 year old female who is seen  as self referral presenting with right medial elbow pain.  2 months ago patient had been helping a friend move their household.  She followed this up with gardening season.  Recently she has been mulching.    Injury: No RITA     Location of Pain: right elbow   Duration of Pain: 1-2 month(s)  Rating of Pain: 3/10  Pain is better with: Nothing  Pain is worse with: pressure over the medial elbow   Lifting objects   Additional Features: Swelling   Treatment so far consists of: Tennis elbow strap   Prior History of related problems: No    There were no vitals taken for this visit.       Medial right elbow pain x 1-2  months      Social History/Occupation: Mental Health Therapist     PMH:  Past Medical History:   Diagnosis Date    Anxiety     Encounter for IUD insertion 01/19/2024    mirena IUD inserted 1/19/24 due for removal 1/19/2032    Night terrors        Active problem list:  Patient Active Problem List   Diagnosis    IUD (intrauterine device) in place    Insomnia, unspecified type    Screening for cervical cancer    Encounter for IUD insertion       FH:  Family History   Problem Relation Age of Onset    Osteoporosis Mother     Hypertension Father     Diabetes Father     Coronary Artery Disease Father 70    Other - See Comments Father         multiple stents    Lymphoma Father     Skin Cancer Father         basal cell    Sleep Disorder Father     Depression Sister     Sleep walking Sister     Other - See Comments Sister         borderline personality disorder    Anxiety Disorder Brother     No Known Problems Maternal Grandmother     Other Cancer Maternal Grandfather 50        colon    Emphysema Paternal Grandmother     Heart Disease Paternal Grandfather     No Known Problems Daughter         b.2014    Breast Cancer Paternal Aunt 65       SH:  Social History     Socioeconomic History    Marital status:       Spouse name: Not on file    Number of children: Not on file    Years of education: Not on file    Highest education level: Not on file   Occupational History    Not on file   Tobacco Use    Smoking status: Never     Passive exposure: Never    Smokeless tobacco: Never   Vaping Use    Vaping status: Never Used   Substance and Sexual Activity    Alcohol use: Yes    Drug use: No    Sexual activity: Yes     Partners: Male     Birth control/protection: Condom, I.U.D.   Other Topics Concern    Parent/sibling w/ CABG, MI or angioplasty before 65F 55M? Not Asked   Social History Narrative    Not on file     Social Drivers of Health     Financial Resource Strain: Low Risk  (12/26/2024)    Financial Resource Strain     Within the past 12 months, have you or your family members you live with been unable to get utilities (heat, electricity) when it was really needed?: No   Food Insecurity: Low Risk  (12/26/2024)    Food Insecurity     Within the past 12 months, did you worry that your food would run out before you got money to buy more?: No     Within the past 12 months, did the food you bought just not last and you didn t have money to get more?: No   Transportation Needs: Low Risk  (12/26/2024)    Transportation Needs     Within the past 12 months, has lack of transportation kept you from medical appointments, getting your medicines, non-medical meetings or appointments, work, or from getting things that you need?: No   Physical Activity: Inactive (12/26/2024)    Exercise Vital Sign     Days of Exercise per Week: 0 days     Minutes of Exercise per Session: 0 min   Stress: No Stress Concern Present (12/26/2024)    [employer] - Occupational Stress Questionnaire     Feeling of Stress : Only a little   Social Connections: Unknown (12/26/2024)    Social Connection and Isolation Panel [NHANES]     Frequency of Communication with Friends and Family: Not on file     Frequency of Social  Gatherings with Friends and Family: Once a week     Attends Voodoo Services: Not on file     Active Member of Clubs or Organizations: Not on file     Attends Club or Organization Meetings: Not on file     Marital Status: Not on file   Interpersonal Safety: Low Risk  (12/30/2024)    Interpersonal Safety     Do you feel physically and emotionally safe where you currently live?: Yes     Within the past 12 months, have you been hit, slapped, kicked or otherwise physically hurt by someone?: No     Within the past 12 months, have you been humiliated or emotionally abused in other ways by your partner or ex-partner?: No   Housing Stability: Low Risk  (12/26/2024)    Housing Stability     Do you have housing? : Yes     Are you worried about losing your housing?: No       MEDS:  See EMR, reviewed  ALL:  See EMR, reviewed    REVIEW OF SYSTEMS:  CONSTITUTIONAL:NEGATIVE for fever, chills, change in weight  INTEGUMENTARY/SKIN: NEGATIVE for worrisome rashes, moles or lesions  EYES: NEGATIVE for vision changes or irritation  ENT/MOUTH: NEGATIVE for ear, mouth and throat problems  RESP:NEGATIVE for significant cough or SOB  BREAST: NEGATIVE for masses, tenderness or discharge  CV: NEGATIVE for chest pain, palpitations or peripheral edema  GI: NEGATIVE for nausea, abdominal pain, heartburn, or change in bowel habits  :NEGATIVE for frequency, dysuria, or hematuria  :NEGATIVE for frequency, dysuria, or hematuria  NEURO: NEGATIVE for weakness, dizziness or paresthesias  ENDOCRINE: NEGATIVE for temperature intolerance, skin/hair changes  HEME/ALLERGY/IMMUNE: NEGATIVE for bleeding problems  PSYCHIATRIC: NEGATIVE for changes in mood or affect        Objective: Patient points to the medial epicondyle of the right elbow as the area of focal discomfort.  She is tender over the medial epicondyle and wrist flexion against resistance reproduces her discomfort.  Wrist flexion strength is strong.  Full range of motion of the elbow.  No  effusion.  Nontender over the ulnar groove.  Nontender over the olecranon or lateral epicondyle.  Digit he minimi strength is strong.  Sensation is normal distally.  Appropriate in conversation and affect.    Assessment: Medial epicondylitis    Plan: We discussed the proper use of a counterforce brace.  Patient plans on using a counterforce brace for the forearm over the next month.  We discussed the possibility of an additional wrist brace if necessary.  Localized friction massage at the medial epicondyle.  Look for slow improvements over the next 4 to 6 weeks.  Avoid excessive gripping and grasping with the right hand.  Follow-up if not improved.

## 2025-06-19 NOTE — LETTER
6/19/2025      RE: Aditi Osuna  [address]  [facility] [address]     Dear Colleague,    Thank you for referring your patient, Aditi Osuna, to the [facility] [place]. Please see a copy of my visit note below.    Sports Medicine Clinic Visit    PCP: Rebekah Antunez    Aditi Osuna is a 49 year old female who is seen  as self referral presenting with right medial elbow pain.  2 months ago patient had been helping a friend move their household.  She followed this up with [employer] season.  Recently she has been mulching.    Injury: No RITA     Location of Pain: right elbow   Duration of Pain: 1-2 month(s)  Rating of Pain: 3/10  Pain is better with: Nothing  Pain is worse with: pressure over the medial elbow   Lifting objects   Additional Features: Swelling   Treatment so far consists of: Tennis elbow strap   Prior History of related problems: No    There were no vitals taken for this visit.       Medial right elbow pain x 1-2  months      Social History/Occupation: Mental Health Therapist     PMH:  Past Medical History:   Diagnosis Date     Anxiety      Encounter for IUD insertion 01/19/2024    mirena IUD inserted 1/19/24 due for removal 1/19/2032     Night terrors        Active problem list:  Patient Active Problem List   Diagnosis     IUD (intrauterine device) in place     Insomnia, unspecified type     Screening for cervical cancer     Encounter for IUD insertion       FH:  Family History   Problem Relation Age of Onset     Osteoporosis Mother      Hypertension Father      Diabetes Father      Coronary Artery Disease Father 70     Other - See Comments Father         multiple stents     Lymphoma Father      Skin Cancer Father         basal cell     Sleep Disorder Father      Depression Sister      Sleep walking Sister      Other - See Comments Sister         borderline personality disorder     Anxiety Disorder Brother      No Known Problems Maternal  Grandmother      Other Cancer Maternal Grandfather 50        colon     Emphysema Paternal Grandmother      Heart Disease Paternal Grandfather      No Known Problems Daughter         b.2014     Breast Cancer Paternal Aunt 65       SH:  Social History     Socioeconomic History     Marital status:      Spouse name: Not on file     Number of children: Not on file     Years of education: Not on file     Highest education level: Not on file   Occupational History     Not on file   Tobacco Use     Smoking status: Never     Passive exposure: Never     Smokeless tobacco: Never   Vaping Use     Vaping status: Never Used   Substance and Sexual Activity     Alcohol use: Yes     Drug use: No     Sexual activity: Yes     Partners: Male     Birth control/protection: Condom, I.U.D.   Other Topics Concern     Parent/sibling w/ CABG, MI or angioplasty before 65F 55M? Not Asked   Social History Narrative     Not on file     Social Drivers of Health     Financial Resource Strain: Low Risk  (12/26/2024)    Financial Resource Strain      Within the past 12 months, have you or your family members you live with been unable to get utilities (heat, electricity) when it was really needed?: No   Food Insecurity: Low Risk  (12/26/2024)    Food Insecurity      Within the past 12 months, did you worry that your food would run out before you got money to buy more?: No      Within the past 12 months, did the food you bought just not last and you didn t have money to get more?: No   Transportation Needs: Low Risk  (12/26/2024)    Transportation Needs      Within the past 12 months, has lack of transportation kept you from medical appointments, getting your medicines, non-medical meetings or appointments, work, or from getting things that you need?: No   Physical Activity: Inactive (12/26/2024)    Exercise Vital Sign      Days of Exercise per Week: 0 days      Minutes of Exercise per Session: 0 min   Stress: No Stress Concern Present  (12/26/2024)    [employer] - Occupational Stress Questionnaire      Feeling of Stress : Only a little   Social Connections: Unknown (12/26/2024)    Social Connection and Isolation Panel [NHANES]      Frequency of Communication with Friends and Family: Not on file      Frequency of Social Gatherings with Friends and Family: Once a week      Attends Alevism Services: Not on file      Active Member of Clubs or Organizations: Not on file      Attends Club or Organization Meetings: Not on file      Marital Status: Not on file   Interpersonal Safety: Low Risk  (12/30/2024)    Interpersonal Safety      Do you feel physically and emotionally safe where you currently live?: Yes      Within the past 12 months, have you been hit, slapped, kicked or otherwise physically hurt by someone?: No      Within the past 12 months, have you been humiliated or emotionally abused in other ways by your partner or ex-partner?: No   Housing Stability: Low Risk  (12/26/2024)    Housing Stability      Do you have housing? : Yes      Are you worried about losing your housing?: No       MEDS:  See EMR, reviewed  ALL:  See EMR, reviewed    REVIEW OF SYSTEMS:  CONSTITUTIONAL:NEGATIVE for fever, chills, change in weight  INTEGUMENTARY/SKIN: NEGATIVE for worrisome rashes, moles or lesions  EYES: NEGATIVE for vision changes or irritation  ENT/MOUTH: NEGATIVE for ear, mouth and throat problems  RESP:NEGATIVE for significant cough or SOB  BREAST: NEGATIVE for masses, tenderness or discharge  CV: NEGATIVE for chest pain, palpitations or peripheral edema  GI: NEGATIVE for nausea, abdominal pain, heartburn, or change in bowel habits  :NEGATIVE for frequency, dysuria, or hematuria  :NEGATIVE for frequency, dysuria, or hematuria  NEURO: NEGATIVE for weakness, dizziness or paresthesias  ENDOCRINE: NEGATIVE for temperature intolerance, skin/hair changes  HEME/ALLERGY/IMMUNE: NEGATIVE for bleeding problems  PSYCHIATRIC:  NEGATIVE for changes in mood or affect        Objective: Patient points to the medial epicondyle of the right elbow as the area of focal discomfort.  She is tender over the medial epicondyle and wrist flexion against resistance reproduces her discomfort.  Wrist flexion strength is strong.  Full range of motion of the elbow.  No effusion.  Nontender over the ulnar groove.  Nontender over the olecranon or lateral epicondyle.  Digit he minimi strength is strong.  Sensation is normal distally.  Appropriate in conversation and affect.    Assessment: Medial epicondylitis    Plan: We discussed the proper use of a counterforce brace.  Patient plans on using a counterforce brace for the forearm over the next month.  We discussed the possibility of an additional wrist brace if necessary.  Localized friction massage at the medial epicondyle.  Look for slow improvements over the next 4 to 6 weeks.  Avoid excessive gripping and grasping with the right hand.  Follow-up if not improved.                            Again, thank you for allowing me to participate in the care of your patient.      Sincerely,    Frankie Hale MD

## 2025-07-25 DIAGNOSIS — Z91.09 ENVIRONMENTAL ALLERGIES: ICD-10-CM

## 2025-07-29 RX ORDER — AZELASTINE HYDROCHLORIDE 137 UG/1
SPRAY, METERED NASAL
Qty: 90 ML | Refills: 2 | Status: SHIPPED | OUTPATIENT
Start: 2025-07-29

## 2025-08-05 ENCOUNTER — OFFICE VISIT (OUTPATIENT)
Dept: FAMILY MEDICINE | Facility: CLINIC | Age: 50
End: 2025-08-05
Payer: COMMERCIAL

## 2025-08-05 VITALS
HEART RATE: 71 BPM | WEIGHT: 164.9 LBS | DIASTOLIC BLOOD PRESSURE: 72 MMHG | HEIGHT: 67 IN | RESPIRATION RATE: 16 BRPM | OXYGEN SATURATION: 99 % | BODY MASS INDEX: 25.88 KG/M2 | SYSTOLIC BLOOD PRESSURE: 109 MMHG | TEMPERATURE: 98.4 F

## 2025-08-05 DIAGNOSIS — S30.0XXA TRAUMATIC ECCHYMOSIS OF BUTTOCK, INITIAL ENCOUNTER: Primary | ICD-10-CM

## 2025-08-05 PROCEDURE — 1125F AMNT PAIN NOTED PAIN PRSNT: CPT | Performed by: PHYSICIAN ASSISTANT

## 2025-08-05 PROCEDURE — 3078F DIAST BP <80 MM HG: CPT | Performed by: PHYSICIAN ASSISTANT

## 2025-08-05 PROCEDURE — 3074F SYST BP LT 130 MM HG: CPT | Performed by: PHYSICIAN ASSISTANT

## 2025-08-05 PROCEDURE — 99213 OFFICE O/P EST LOW 20 MIN: CPT | Performed by: PHYSICIAN ASSISTANT

## 2025-08-05 ASSESSMENT — PAIN SCALES - GENERAL: PAINLEVEL_OUTOF10: MILD PAIN (2)

## 2025-08-12 DIAGNOSIS — N95.1 INSOMNIA ASSOCIATED WITH MENOPAUSE: ICD-10-CM

## 2025-08-12 DIAGNOSIS — N95.1 VASOMOTOR SYMPTOMS DUE TO MENOPAUSE: ICD-10-CM

## 2025-08-13 RX ORDER — ESTRADIOL 0.03 MG/D
FILM, EXTENDED RELEASE TRANSDERMAL
Qty: 16 PATCH | Refills: 0 | Status: SHIPPED | OUTPATIENT
Start: 2025-08-13

## (undated) RX ORDER — DIPHENHYDRAMINE HYDROCHLORIDE 50 MG/ML
INJECTION INTRAMUSCULAR; INTRAVENOUS
Status: DISPENSED
Start: 2023-05-12

## (undated) RX ORDER — FENTANYL CITRATE 50 UG/ML
INJECTION, SOLUTION INTRAMUSCULAR; INTRAVENOUS
Status: DISPENSED
Start: 2023-05-12